# Patient Record
Sex: FEMALE | Race: WHITE | NOT HISPANIC OR LATINO | Employment: OTHER | URBAN - METROPOLITAN AREA
[De-identification: names, ages, dates, MRNs, and addresses within clinical notes are randomized per-mention and may not be internally consistent; named-entity substitution may affect disease eponyms.]

---

## 2017-01-26 ENCOUNTER — TELEPHONE (OUTPATIENT)
Dept: INTERNAL MEDICINE | Facility: CLINIC | Age: 72
End: 2017-01-26

## 2017-01-26 NOTE — TELEPHONE ENCOUNTER
Our goal is to have forms completed within 72 hours, however some forms may require a visit or additional information.    The form was placed at [facility]    Who is the form from? Patient  Where did the form come from? Patient Dropped off in clinic.  Patient's expectations: ASAP   When does it need to be completed: ASAP     The form was placed in the inbox of: Internal Medicine Providers - Dr. Logan Briones  PMD folder    Please mail to [facility] in addressed envelope provided.      Additional comments: Copy of last [employer] form attached to new form.    Call take on 1/26/2017 at 3:40 PM by Chelsey Urbano

## 2017-02-07 ENCOUNTER — PRE VISIT (OUTPATIENT)
Dept: UROLOGY | Facility: CLINIC | Age: 72
End: 2017-02-07

## 2017-02-07 NOTE — TELEPHONE ENCOUNTER
Patient with mixed incontinence coming in to review same day UDS and for a cystoscopy. Chart reviewed and all appointments are made. Pt will need a catheterized urine one week before. Message sent to schedulers.

## 2017-02-09 DIAGNOSIS — E11.59 TYPE 2 DIABETES MELLITUS WITH OTHER CIRCULATORY COMPLICATIONS (H): Primary | ICD-10-CM

## 2017-02-09 NOTE — TELEPHONE ENCOUNTER
metFORMIN (GLUCOPHAGE) 1000 MG tablet         Last Written Prescription Date: 11/11/2016  Last Fill Quantity: 180 tablat, # refills: 0  Last Office Visit with G, P or [facility] prescribing provider:  10/27/2016        BP Readings from Last 3 Encounters:   10/27/16 138/80   10/23/16 158/85   10/06/16 140/100     MICROL      156   9/6/2016  No results found for this basename: microalbumin  CREATININE   Date Value Ref Range Status   10/27/2016 0.66 0.52 - 1.04 mg/dL Final   ]  GFR ESTIMATE   Date Value Ref Range Status   10/27/2016 88 >60 mL/min/1.7m2 Final     Comment:     Non  GFR Calc   10/23/2016 80 >60 mL/min/1.7m2 Final     Comment:     Non  GFR Calc   10/22/2016 71 >60 mL/min/1.7m2 Final     Comment:     Non  GFR Calc     GFR ESTIMATE IF BLACK   Date Value Ref Range Status   10/27/2016 >90   GFR Calc   >60 mL/min/1.7m2 Final   10/23/2016 >90   GFR Calc   >60 mL/min/1.7m2 Final   10/22/2016 86 >60 mL/min/1.7m2 Final     Comment:      GFR Calc     CHOL      147   9/6/2016  HDL       50   9/6/2016  LDL       45   9/6/2016  TRIG      259   9/6/2016  CHOLHDLRATIO      2.6   6/29/2015  AST       21   9/6/2016  ALT       28   9/6/2016  A1C      6.5   9/6/2016  A1C      6.5   11/22/2015  A1C      8.4   5/5/2015  A1C      7.5   7/21/2014  A1C      7.4   4/1/2014  POTASSIUM   Date Value Ref Range Status   10/27/2016 3.8 3.4 - 5.3 mmol/L Final

## 2017-02-13 ENCOUNTER — HOSPITAL ENCOUNTER (OUTPATIENT)
Dept: MAMMOGRAPHY | Facility: CLINIC | Age: 72
Discharge: HOME | End: 2017-02-13
Attending: INTERNAL MEDICINE | Admitting: INTERNAL MEDICINE
Payer: MEDICARE

## 2017-02-13 DIAGNOSIS — N63.20 LEFT BREAST LUMP: ICD-10-CM

## 2017-02-13 PROCEDURE — 76642 ULTRASOUND BREAST LIMITED: CPT | Mod: LT

## 2017-02-15 DIAGNOSIS — K21.9 ESOPHAGEAL REFLUX: Primary | ICD-10-CM

## 2017-02-15 RX ORDER — OMEPRAZOLE 20 MG/1
20 TABLET, DELAYED RELEASE ORAL
Qty: 180 TABLET | Refills: 1 | Status: SHIPPED | OUTPATIENT
Start: 2017-02-15 | End: 2018-01-26

## 2017-02-15 NOTE — TELEPHONE ENCOUNTER
omeprazole (PRILOSEC OTC) 20 MG tablet      Last Written Prescription Date: 12/20/2016  Last Fill Quantity: 180,  # refills: 2   Last Office Visit with [facility], [facility] or [facility] prescribing provider: 10/27/2016

## 2017-03-07 DIAGNOSIS — G25.81 RESTLESS LEG: ICD-10-CM

## 2017-03-07 RX ORDER — ROPINIROLE 4 MG/1
1 TABLET, FILM COATED ORAL AT BEDTIME
Qty: 90 TABLET | Refills: 1 | Status: SHIPPED | OUTPATIENT
Start: 2017-03-07 | End: 2017-09-11

## 2017-03-07 NOTE — TELEPHONE ENCOUNTER
ROPINIROLE HYDROCHLORIDE      Last Written Prescription Date: 06/07/16  Last Fill Quantity: 90, # refills: 2  Last Office Visit with [facility], [facility] or [facility] prescribing provider: 10/27/16   Next 5 appointments (look out 90 days)     Mar 09, 2017 11:30 AM CST   [facility] with Logan Briones MD   [facility]    [address] [address] [phone]                   BP Readings from Last 3 Encounters:   10/27/16 138/80   10/23/16 158/85   10/06/16 (!) 140/100

## 2017-03-09 ENCOUNTER — OFFICE VISIT (OUTPATIENT)
Dept: INTERNAL MEDICINE | Facility: CLINIC | Age: 72
End: 2017-03-09
Payer: MEDICARE

## 2017-03-09 VITALS
HEART RATE: 67 BPM | HEIGHT: 62 IN | OXYGEN SATURATION: 96 % | DIASTOLIC BLOOD PRESSURE: 86 MMHG | TEMPERATURE: 98.1 F | WEIGHT: 157 LBS | BODY MASS INDEX: 28.89 KG/M2 | SYSTOLIC BLOOD PRESSURE: 130 MMHG

## 2017-03-09 DIAGNOSIS — R25.2 CRAMP OF LIMB: Primary | ICD-10-CM

## 2017-03-09 DIAGNOSIS — E11.59 TYPE 2 DIABETES MELLITUS WITH OTHER CIRCULATORY COMPLICATIONS (H): ICD-10-CM

## 2017-03-09 DIAGNOSIS — R09.89 OTHER SPECIFIED SYMPTOMS AND SIGNS INVOLVING THE CIRCULATORY AND RESPIRATORY SYSTEMS: ICD-10-CM

## 2017-03-09 DIAGNOSIS — E03.9 HYPOTHYROIDISM, UNSPECIFIED TYPE: ICD-10-CM

## 2017-03-09 PROBLEM — Z71.89 ADVANCED DIRECTIVES, COUNSELING/DISCUSSION: Status: ACTIVE | Noted: 2017-03-09

## 2017-03-09 LAB
ALBUMIN SERPL-MCNC: 4.1 G/DL (ref 3.4–5)
ALP SERPL-CCNC: 83 U/L (ref 40–150)
ALT SERPL W P-5'-P-CCNC: 26 U/L (ref 0–50)
ANION GAP SERPL CALCULATED.3IONS-SCNC: 7 MMOL/L (ref 3–14)
AST SERPL W P-5'-P-CCNC: 15 U/L (ref 0–45)
BILIRUB SERPL-MCNC: 0.4 MG/DL (ref 0.2–1.3)
BUN SERPL-MCNC: 15 MG/DL (ref 7–30)
CALCIUM SERPL-MCNC: 9.2 MG/DL (ref 8.5–10.1)
CHLORIDE SERPL-SCNC: 109 MMOL/L (ref 94–109)
CO2 SERPL-SCNC: 27 MMOL/L (ref 20–32)
CREAT SERPL-MCNC: 0.7 MG/DL (ref 0.52–1.04)
FERRITIN SERPL-MCNC: 9 NG/ML (ref 8–252)
GFR SERPL CREATININE-BSD FRML MDRD: 82 ML/MIN/1.7M2
GLUCOSE SERPL-MCNC: 90 MG/DL (ref 70–99)
HBA1C MFR BLD: 6.3 % (ref 4.3–6)
IRON SATN MFR SERPL: 17 % (ref 15–46)
IRON SERPL-MCNC: 63 UG/DL (ref 35–180)
MAGNESIUM SERPL-MCNC: 1.8 MG/DL (ref 1.6–2.3)
POTASSIUM SERPL-SCNC: 4.3 MMOL/L (ref 3.4–5.3)
PROT SERPL-MCNC: 7.6 G/DL (ref 6.8–8.8)
SODIUM SERPL-SCNC: 143 MMOL/L (ref 133–144)
T4 FREE SERPL-MCNC: 1.39 NG/DL (ref 0.76–1.46)
TIBC SERPL-MCNC: 371 UG/DL (ref 240–430)
TSH SERPL DL<=0.005 MIU/L-ACNC: 0.2 MU/L (ref 0.4–4)

## 2017-03-09 PROCEDURE — 83036 HEMOGLOBIN GLYCOSYLATED A1C: CPT | Performed by: INTERNAL MEDICINE

## 2017-03-09 PROCEDURE — 84443 ASSAY THYROID STIM HORMONE: CPT | Performed by: INTERNAL MEDICINE

## 2017-03-09 PROCEDURE — 99214 OFFICE O/P EST MOD 30 MIN: CPT | Performed by: INTERNAL MEDICINE

## 2017-03-09 PROCEDURE — 82728 ASSAY OF FERRITIN: CPT | Performed by: INTERNAL MEDICINE

## 2017-03-09 PROCEDURE — 83540 ASSAY OF IRON: CPT | Performed by: INTERNAL MEDICINE

## 2017-03-09 PROCEDURE — 84439 ASSAY OF FREE THYROXINE: CPT | Performed by: INTERNAL MEDICINE

## 2017-03-09 PROCEDURE — 83735 ASSAY OF MAGNESIUM: CPT | Performed by: INTERNAL MEDICINE

## 2017-03-09 PROCEDURE — 83550 IRON BINDING TEST: CPT | Performed by: INTERNAL MEDICINE

## 2017-03-09 PROCEDURE — 36415 COLL VENOUS BLD VENIPUNCTURE: CPT | Performed by: INTERNAL MEDICINE

## 2017-03-09 PROCEDURE — 80053 COMPREHEN METABOLIC PANEL: CPT | Performed by: INTERNAL MEDICINE

## 2017-03-09 PROCEDURE — 99207 C FOOT EXAM  NO CHARGE: CPT | Performed by: INTERNAL MEDICINE

## 2017-03-09 NOTE — PROGRESS NOTES
"  SUBJECTIVE:                                                    Susi Aguila is a 71 year old female who presents to clinic today for the following health issues:      Chief Complaint   Patient presents with     Spasms     spasms in left upper thigh at night- very painful- onset x 6 months- worse over the past 1 week- interfering with sleep     Pt's past medical history, family history, habits, medications and allergies were reviewed with the patient today.  See snap shot for  HCM status. Most recent lab results reviewed with pt. Problem list and histories reviewed & adjusted, as indicated.  Additional history as below:    Spasms only in left thigh area. No RLE sx. Starts when going to sleep. No sx in the daytime. In WC all day. Has chronic back pain that is \"crappy\".  HAs not been checking sugards at home. Hx diabetes mellitus. Denies chest pain, shortness of breath, abd pain, new vision changes.  Eye exam UTD     Additional ROS:   Constitutional, HEENT, Cardiovascular, Pulmonary, GI and , Neuro, MSK and Psych review of systems/symptoms are otherwise negative or unchanged from previous, except as noted above.      OBJECTIVE:  /86  Pulse 67  Temp 98.1  F (36.7  C) (Oral)  Ht 5' 2\" (1.575 m)  Wt 157 lb (71.2 kg)  SpO2 96%  Breastfeeding? No  BMI 28.72 kg/m2   Estimated body mass index is 28.72 kg/(m^2) as calculated from the following:    Height as of this encounter: 5' 2\" (1.575 m).    Weight as of this encounter: 157 lb (71.2 kg).  Eye: PERRL, EOMI  HENT: ear canals and TM's normal and nose and mouth without ulcers or lesions   Neck: no adenopathy. Thyroid normal to palpation. No bruits  Pulm: Lungs clear to auscultation   CV: Regular rates and rhythm  GI: Soft, nontender, Normal active bowel sounds, No hepatosplenomegaly or masses palpable  Ext: Peripheral pulses intact but fainter left PT and DP with left leg feeling cooler to touch from the mid calf distally. Trace BLE edema.  Neuro: Normal strength " and tone RUE and RLE. LLE/LUE hemiparesis. 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 Sitting in WC. Gait not assessed    Assessment/Plan: (See plan discussion below for further details)  1. Cramp of limb  Labs as ordered. U/S LLE to r/o ischemia. If all below normal, will adjust Gabapentin dosing  - Comprehensive metabolic panel  - Iron and iron binding capacity  - Ferritin  - Magnesium  - US Lower Extremity Arterial Duplex Left; Future  - T4 free    2. 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 Not checking sugars. Labs as ordered. Continue current meds for now  - Hemoglobin A1c  - FOOT EXAM  - EYE EXAM (SIMPLE-NONBILLABLE)  - Hemoglobin A1c; Future  - Comprehensive metabolic panel; Future  - Lipid panel reflex to direct LDL; Future  - TSH with free T4 reflex; Future    3. Hypothyroidism, unspecified type  Labs as ordered  - TSH with free T4 reflex  - TSH with free T4 reflex; Future    4. Other specified symptoms and signs involving the circulatory and respiratory systems   See above  - US Lower Extremity Arterial Duplex Left; Future         Logan Briones MD  Internal Medicine Department  [facility]

## 2017-03-09 NOTE — LETTER
"[facility]  [address] [place] [address]  [phone]      3/12/2017       Susi Aguila  [address]   [place] [address]        Dear Susi,  Rafa are a printed copy of your most recent  lab results.   Unless commented on below, mild variation of results  outside the normal range are not clinically signicant.    Electrolyte, Glucose/Sugar, Iron, Kidney function and Liver lab results were normal.  One thyroid test (TSH) lab result was slightly abnormal which would mean your thyroid function was too high but the  other thyroid hormone lab (FT4) was normal so the data is conflicting. TSH results can sometimes vary  due to other factors so I would recommend staying on the same dose of Levothyroxine for now and retesting in the future  A1C lab result (which assesses your average blood sugar control for the past 3 months) was stable and overall showed good control of your diabetes.  Continue current medication for now. Please contact your pharmacy if you need further refills of your medication.  I will await the results of your leg arterial ultrasound study and then make further recommendations  Call our appointment desk at [phone] to schedule a \"lab only\" to have your A1C, Comprehensive Metabolic Panel, Lipid profile (Cholesterol Panel) and TSH checked fasting in 6 months.   For fasting labs, please refrain from eating for 8 hours or more.  Be sure to  drink water and take your  medications the day of the test.     If you have further questions/concerns regarding the results, I would ask that you bring them to your next follow-up appointment with me and I would be happy to review them with you further.        Sincerely,      Logan Briones MD  Internal Medicine      "

## 2017-03-09 NOTE — MR AVS SNAPSHOT
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 They will call to schedule. If not heard from them by Monday, then call [phone]  Labs as ordered  Further treatment based on results        Follow-ups after your visit        Your next 10 appointments already scheduled     Apr 06, 2017  9:30 AM CDT   (Arrive by 9:15 AM)   Urodynamics with  Uro Procedure Nurse   [facility] ([facility])    [address] [place] [address] [phone]            Apr 06, 2017  1:00 PM CDT   (Arrive by 12:45 PM)   Cystoscopy with Ruby Rodriguez MD   [facility] ([facility])    [address] [place] [address] [phone]              Future tests that were ordered for you today     Open Future Orders        Priority Expected Expires Ordered    US Lower Extremity Arterial Duplex Left Routine  3/9/2018 3/9/2017            Who to contact     If you have questions or need follow up information about today's clinic visit or your schedule please contact [facility] directly at [phone].  Normal or non-critical lab and imaging results will be communicated to you by MyChart, letter or phone within 4 business days after the clinic has  "received the results. If you do not hear from us within 7 days, please contact the clinic through [employer] or phone. If you have a critical or abnormal lab result, we will notify you by phone as soon as possible.  Submit refill requests through [employer] or call your pharmacy and they will forward the refill request to us. Please allow 3 business days for your refill to be completed.          Additional Information About Your Visit        Care EveryWhere ID     This is your Care EveryWhere ID. This could be used by other organizations to access your [facility] medical records  [id number]        Your Vitals Were     Pulse Temperature Height Pulse Oximetry Breastfeeding? BMI (Body Mass Index)    67 98.1  F (36.7  C) (Oral) 5' 2\" (1.575 m) 96% No 28.72 kg/m2       Blood Pressure from Last 3 Encounters:   03/09/17 130/86   10/27/16 138/80   10/23/16 158/85    Weight from Last 3 Encounters:   03/09/17 157 lb (71.2 kg)   10/27/16 137 lb (62.1 kg)   10/22/16 161 lb 8 oz (73.3 kg)              We Performed the Following     Comprehensive metabolic panel     EYE EXAM (SIMPLE-NONBILLABLE)     Ferritin     FOOT EXAM     Hemoglobin A1c     Iron and iron binding capacity     Magnesium     TSH with free T4 reflex          Today's Medication Changes          These changes are accurate as of: 3/9/17 12:10 PM.  If you have any questions, ask your nurse or doctor.               These medicines have changed or have updated prescriptions.        Dose/Directions    gabapentin 600 MG tablet   Commonly known as:  NEURONTIN   This may have changed:  when to take this   Used for:  Sciatica, unspecified laterality        Dose:  600 mg   Take 1 tablet (600 mg) by mouth 3 times daily   Quantity:  270 tablet   Refills:  3                Primary Care Provider Office Phone # Fax #    Logan Briones [phone]       [facility] [address] [facility] [address]        Thank you!     Thank you for choosing [facility] " [facility]  for your care. Our goal is always to provide you with excellent care. Hearing back from our patients is one way we can continue to improve our services. Please take a few minutes to complete the written survey that you may receive in the mail after your visit with us. Thank you!             Your Updated Medication List - Protect others around you: Learn how to safely use, store and throw away your medicines at www.disposemymeds.org.          This list is accurate as of: 3/9/17 12:10 PM.  Always use your most recent med list.                   Brand Name Dispense Instructions for use    cycloSPORINE 0.05 % ophthalmic emulsion    RESTASIS     Place 1 drop into both eyes 3 times daily       fluticasone 50 MCG/ACT spray    FLONASE    3 Package    Spray 1-2 sprays into both nostrils daily if needed       gabapentin 600 MG tablet    NEURONTIN    270 tablet    Take 1 tablet (600 mg) by mouth 3 times daily       levothyroxine 100 MCG tablet    SYNTHROID/LEVOTHROID    90 tablet    Take 1 tablet (100 mcg) by mouth daily       lisinopril 20 MG tablet    PRINIVIL/ZESTRIL    90 tablet    Take 1 tablet (20 mg) by mouth daily       metFORMIN 1000 MG tablet    GLUCOPHAGE    180 tablet    Take 1 tablet (1,000 mg) by mouth 2 times daily (with meals)       naproxen 500 MG tablet    NAPROSYN    60 tablet    Take 1 tablet (500 mg) by mouth 2 times daily (with meals) as needed for pain       omeprazole 20 MG tablet    priLOSEC OTC    180 tablet    Take 1 tablet (20 mg) by mouth 2 times daily (before meals)       ranitidine 150 MG tablet    ZANTAC    180 tablet    Take 1 tablet (150 mg) by mouth 2 times daily       ROPINIROLE HYDROCHLORIDE 4 MG Tabs    REQUIP    90 tablet    Take 1 tablet (4 mg) by mouth At Bedtime       traZODone 50 MG tablet    DESYREL    90 tablet    Take 1 tablet (50 mg) by mouth At Bedtime

## 2017-03-09 NOTE — NURSING NOTE
"Chief Complaint   Patient presents with     Spasms     spasms in left upper thigh at night- very painful- onset x 6 months- worse over the past 1 week- interfering with sleep       Initial /86  Pulse 67  Temp 98.1  F (36.7  C) (Oral)  Ht 5' 2\" (1.575 m)  Wt 157 lb (71.2 kg)  SpO2 96%  Breastfeeding? No  BMI 28.72 kg/m2 Estimated body mass index is 28.72 kg/(m^2) as calculated from the following:    Height as of this encounter: 5' 2\" (1.575 m).    Weight as of this encounter: 157 lb (71.2 kg).  Medication Reconciliation: complete    "

## 2017-03-09 NOTE — PATIENT INSTRUCTIONS
Ultrasound of left leg arterial syste at [facility]. They will call to schedule. If not heard from them by Monday, then call [phone]  Labs as ordered  Further treatment based on results

## 2017-03-13 DIAGNOSIS — E78.5 HYPERLIPIDEMIA LDL GOAL <100: ICD-10-CM

## 2017-03-13 RX ORDER — ATORVASTATIN CALCIUM 40 MG/1
40 TABLET, FILM COATED ORAL DAILY
Qty: 90 TABLET | Refills: 1 | Status: SHIPPED | OUTPATIENT
Start: 2017-03-13 | End: 2018-01-26

## 2017-03-13 NOTE — TELEPHONE ENCOUNTER
atorvastatin (LIPITOR) 40 MG tablet       Last Written Prescription Date:  9/13/2016  Last Fill Quantity: 90,   # refills: 1  Last Office Visit with [facility], [facility] or [facility] prescribing provider: 3/09/2017  Future Office visit:       Routing refill request to provider for review/approval because:  Medication is reported/discontinued

## 2017-03-20 ENCOUNTER — HOSPITAL ENCOUNTER (OUTPATIENT)
Dept: ULTRASOUND IMAGING | Facility: CLINIC | Age: 72
Discharge: HOME | End: 2017-03-20
Attending: INTERNAL MEDICINE | Admitting: INTERNAL MEDICINE
Payer: MEDICARE

## 2017-03-20 DIAGNOSIS — R09.89 OTHER SPECIFIED SYMPTOMS AND SIGNS INVOLVING THE CIRCULATORY AND RESPIRATORY SYSTEMS: ICD-10-CM

## 2017-03-20 DIAGNOSIS — R25.2 CRAMP OF LIMB: ICD-10-CM

## 2017-03-20 PROCEDURE — 93926 LOWER EXTREMITY STUDY: CPT | Mod: LT

## 2017-03-24 DIAGNOSIS — G47.00 INSOMNIA: ICD-10-CM

## 2017-03-27 DIAGNOSIS — B00.9 HSV (HERPES SIMPLEX VIRUS) INFECTION: ICD-10-CM

## 2017-03-27 RX ORDER — TRAZODONE HYDROCHLORIDE 50 MG/1
TABLET, FILM COATED ORAL
Qty: 90 TABLET | Refills: 3 | Status: SHIPPED | OUTPATIENT
Start: 2017-03-27 | End: 2017-07-27

## 2017-03-27 RX ORDER — ACYCLOVIR 200 MG/1
CAPSULE ORAL
Qty: 25 CAPSULE | Refills: 5 | Status: ON HOLD | OUTPATIENT
Start: 2017-03-27 | End: 2018-02-08

## 2017-03-27 RX ORDER — ACYCLOVIR 200 MG/1
200 CAPSULE ORAL
Qty: 25 CAPSULE | Refills: 5 | Status: SHIPPED | OUTPATIENT
Start: 2017-03-27 | End: 2017-03-27

## 2017-03-27 NOTE — TELEPHONE ENCOUNTER
Pt is has developed an outbreak of herpes and wondering if she can get a script for Acyclovir.  Please let her know

## 2017-03-27 NOTE — TELEPHONE ENCOUNTER
traZODone (DESYREL) 50 MG tablet       Last Written Prescription Date: 7/08/2016  Last Fill Quantity: 90; # refills: 2  Last Office Visit with [facility], [facility] or [facility] prescribing provider:  3/09/2017   Next 5 appointments (look out 90 days)     Mar 30, 2017  2:30 PM CDT   Nurse Only with [facility] IM NURSE   [facility]    [address]  [facility] [id number] [phone]                   Last PHQ-9 score on record=   PHQ-9 SCORE 4/25/2016   Total Score -   Total Score 5       Lab Results   Component Value Date    AST 15 03/09/2017     Lab Results   Component Value Date    ALT 26 03/09/2017

## 2017-04-03 DIAGNOSIS — R30.0 DYSURIA: Primary | ICD-10-CM

## 2017-04-04 ENCOUNTER — PRE VISIT (OUTPATIENT)
Dept: UROLOGY | Facility: CLINIC | Age: 72
End: 2017-04-04

## 2017-04-04 NOTE — TELEPHONE ENCOUNTER
Patient coming in for a cystoscopy and to review UDS results. Chart reviewed and all appointments are made. No need for a call.

## 2017-04-19 ENCOUNTER — HOSPITAL ENCOUNTER (OUTPATIENT)
Dept: MRI IMAGING | Facility: CLINIC | Age: 72
Discharge: HOME | End: 2017-04-19
Attending: NEUROLOGICAL SURGERY | Admitting: NEUROLOGICAL SURGERY
Payer: MEDICARE

## 2017-04-19 ENCOUNTER — HOSPITAL ENCOUNTER (OUTPATIENT)
Dept: MRI IMAGING | Facility: CLINIC | Age: 72
End: 2017-04-19
Attending: NEUROLOGICAL SURGERY
Payer: MEDICARE

## 2017-04-19 DIAGNOSIS — I67.1 BRAIN ANEURYSM: ICD-10-CM

## 2017-04-19 PROCEDURE — 70553 MRI BRAIN STEM W/O & W/DYE: CPT

## 2017-04-19 PROCEDURE — A9585 GADOBUTROL INJECTION: HCPCS | Performed by: NEUROLOGICAL SURGERY

## 2017-04-19 PROCEDURE — 70544 MR ANGIOGRAPHY HEAD W/O DYE: CPT | Mod: XS

## 2017-04-19 PROCEDURE — 25500064 ZZH RX 255 OP 636: Performed by: NEUROLOGICAL SURGERY

## 2017-04-19 RX ORDER — GADOBUTROL 604.72 MG/ML
7 INJECTION INTRAVENOUS ONCE
Status: COMPLETED | OUTPATIENT
Start: 2017-04-19 | End: 2017-04-19

## 2017-04-19 RX ADMIN — GADOBUTROL 7 ML: 604.72 INJECTION INTRAVENOUS at 14:32

## 2017-05-08 ENCOUNTER — DOCUMENTATION ONLY (OUTPATIENT)
Dept: OTHER | Facility: CLINIC | Age: 72
End: 2017-05-08

## 2017-05-08 DIAGNOSIS — Z71.89 ACP (ADVANCE CARE PLANNING): ICD-10-CM

## 2017-05-19 DIAGNOSIS — E11.59 TYPE 2 DIABETES MELLITUS WITH OTHER CIRCULATORY COMPLICATIONS (H): ICD-10-CM

## 2017-05-19 NOTE — TELEPHONE ENCOUNTER
metFORMIN (GLUCOPHAGE) 1000 MG tablet         Last Written Prescription Date: 2/09/2017  Last Fill Quantity: 180, # refills: 0  Last Office Visit with [facility] or [facility] prescribing provider:  3/09/2017        BP Readings from Last 3 Encounters:   03/09/17 130/86   10/27/16 138/80   10/23/16 158/85     Lab Results   Component Value Date    MICROL 156 09/06/2016     Lab Results   Component Value Date    UMALCR 96.30 09/06/2016     Creatinine   Date Value Ref Range Status   03/09/2017 0.70 0.52 - 1.04 mg/dL Final   ]  GFR Estimate   Date Value Ref Range Status   03/09/2017 82 >60 mL/min/1.7m2 Final     Comment:     Non  GFR Calc   10/27/2016 88 >60 mL/min/1.7m2 Final     Comment:     Non  GFR Calc   10/23/2016 80 >60 mL/min/1.7m2 Final     Comment:     Non  GFR Calc     GFR Estimate If Black   Date Value Ref Range Status   03/09/2017 >90   GFR Calc   >60 mL/min/1.7m2 Final   10/27/2016 >90   GFR Calc   >60 mL/min/1.7m2 Final   10/23/2016 >90   GFR Calc   >60 mL/min/1.7m2 Final     Lab Results   Component Value Date    CHOL 147 09/06/2016     Lab Results   Component Value Date    HDL 50 09/06/2016     Lab Results   Component Value Date    LDL 45 09/06/2016     Lab Results   Component Value Date    TRIG 259 09/06/2016     Lab Results   Component Value Date    CHOLHDLRATIO 2.6 06/29/2015     Lab Results   Component Value Date    AST 15 03/09/2017     Lab Results   Component Value Date    ALT 26 03/09/2017     Lab Results   Component Value Date    A1C 6.3 03/09/2017    A1C 6.5 09/06/2016    A1C 6.5 11/22/2015    A1C 8.4 05/05/2015    A1C 7.5 07/21/2014     Potassium   Date Value Ref Range Status   03/09/2017 4.3 3.4 - 5.3 mmol/L Final

## 2017-05-23 ENCOUNTER — TELEPHONE (OUTPATIENT)
Dept: INTERNAL MEDICINE | Facility: CLINIC | Age: 72
End: 2017-05-23

## 2017-05-23 ENCOUNTER — PRE VISIT (OUTPATIENT)
Dept: UROLOGY | Facility: CLINIC | Age: 72
End: 2017-05-23

## 2017-05-23 DIAGNOSIS — M54.30 SCIATICA, UNSPECIFIED LATERALITY: ICD-10-CM

## 2017-05-23 RX ORDER — NAPROXEN 500 MG/1
TABLET ORAL
Qty: 60 TABLET | Refills: 9 | Status: SHIPPED | OUTPATIENT
Start: 2017-05-23 | End: 2017-05-23

## 2017-05-23 NOTE — TELEPHONE ENCOUNTER
Reason for call: Form   Our goal is to have forms completed within 72 hours, however some forms may require a visit or additional information.     Who is the form from? [employer]   Where did the form come from? Patient or family brought in     What clinic location was the form placed at? [facility]  Where was the form placed? Pcp folder  What number is listed as a contact on the form?     Phone call message - patient request for a letter, form or note:     Date needed: as soon as possible  Please fax to [employer] @ [phone]  Has the patient signed a consent form for release of information? Not Applicable    Additional comments:     Type of letter, form or note: [employer] Form    Phone Number Pt can be reached at: Home number on file [phone] (home)  Best Time:   Can we leave a detailed message on this number? YES

## 2017-05-23 NOTE — TELEPHONE ENCOUNTER
form from "[employer] to be completed in MD's folder on s station; pt called and states it needs to be faxed back today by 7p; fax # on form

## 2017-05-23 NOTE — TELEPHONE ENCOUNTER
Chart reviewed for upcoming UDS.  Chart note says Jennifer wants cathed sample week before; called patient requesting she come to nurse visit this week to drop sample. Says she cannot come here/is a huge pain with [employer] and wants to drop sample when she is at her lab appt at [facility] this week.  Called [facility] and they have hats.  Patient will try to urinate in hat.  Sent this info to Dr. Rodriguez.

## 2017-05-23 NOTE — TELEPHONE ENCOUNTER
Naproxen       Last Written Prescription Date: 7/6/16  Last Quantity: 60, # refills: 1  Last Office Visit with [facility], [facility] or [facility] prescribing provider: 3/9/17       Creatinine   Date Value Ref Range Status   03/09/2017 0.70 0.52 - 1.04 mg/dL Final     Lab Results   Component Value Date    AST 15 03/09/2017     Lab Results   Component Value Date    ALT 26 03/09/2017     BP Readings from Last 3 Encounters:   03/09/17 130/86   10/27/16 138/80   10/23/16 158/85

## 2017-05-24 DIAGNOSIS — K21.9 ESOPHAGEAL REFLUX: ICD-10-CM

## 2017-05-24 RX ORDER — NAPROXEN 500 MG/1
TABLET ORAL
Qty: 180 TABLET | Refills: 2 | Status: SHIPPED | OUTPATIENT
Start: 2017-05-24 | End: 2021-01-21

## 2017-05-26 ENCOUNTER — OFFICE VISIT (OUTPATIENT)
Dept: UROLOGY | Facility: CLINIC | Age: 72
End: 2017-05-26
Payer: MEDICARE

## 2017-05-26 DIAGNOSIS — R30.0 DYSURIA: ICD-10-CM

## 2017-05-26 DIAGNOSIS — N39.46 MIXED INCONTINENCE: Primary | ICD-10-CM

## 2017-05-26 LAB
ALBUMIN UR-MCNC: 30 MG/DL
APPEARANCE UR: ABNORMAL
BILIRUB UR QL STRIP: NEGATIVE
COLOR UR AUTO: YELLOW
GLUCOSE UR STRIP-MCNC: NEGATIVE MG/DL
HGB UR QL STRIP: ABNORMAL
KETONES UR STRIP-MCNC: NEGATIVE MG/DL
LEUKOCYTE ESTERASE UR QL STRIP: ABNORMAL
NITRATE UR QL: POSITIVE
PH UR STRIP: 5 PH (ref 5–7)
SP GR UR STRIP: 1.01 (ref 1–1.03)
URN SPEC COLLECT METH UR: ABNORMAL
UROBILINOGEN UR STRIP-ACNC: 1 EU/DL (ref 0.2–1)

## 2017-05-26 PROCEDURE — 81003 URINALYSIS AUTO W/O SCOPE: CPT | Performed by: UROLOGY

## 2017-05-26 PROCEDURE — 87186 SC STD MICRODIL/AGAR DIL: CPT | Performed by: UROLOGY

## 2017-05-26 PROCEDURE — 87088 URINE BACTERIA CULTURE: CPT | Performed by: UROLOGY

## 2017-05-26 PROCEDURE — 99211 OFF/OP EST MAY X REQ PHY/QHP: CPT | Performed by: UROLOGY

## 2017-05-26 PROCEDURE — 87086 URINE CULTURE/COLONY COUNT: CPT | Performed by: UROLOGY

## 2017-05-26 NOTE — PROGRESS NOTES
Susi Aguila comes into clinic today at the request of Dr. Rodriguez for cathed UA/UC.  This service provided today was under the supervising provider of the day, Dr. Rock. who was available if needed.  Patient here for cathed UA/UC prior to procedure at the [facility].      Catheter insertion documentation on 5/26/2017:    Susi Aguila presents to the clinic for catheter insertion.  Reason for insertion: collect UA/UC  Order has been verified. yes  Catheter successfully inserted into the urethral meatus in the usual sterile fashion without immediate complication.  Type of catheter placed: 14 fr female catheter  Urine is brandon  200 cc's of urine output returned.  Catheter removed.  The patient tolerated the procedure well and was instructed that we will call her with culture results.  Treatment pending results.    So Pickering LPN

## 2017-05-26 NOTE — MR AVS SNAPSHOT
After Visit Summary   5/26/2017    Susi Aguila    MRN: [id number]           Patient Information     Date Of Birth          1945        Visit Information        Provider Department      5/26/2017 10:30 AM UA NURSE [facility]        Today's Diagnoses     Dysuria           Follow-ups after your visit        Your next 10 appointments already scheduled     Jun 01, 2017  9:30 AM CDT   (Arrive by 9:15 AM)   Urodynamics with Uc Uro Procedure Nurse   [facility] ([facility])    [address] [place] [id number]   [phone]            Jun 01, 2017  1:00 PM CDT   (Arrive by 12:45 PM)   Cystoscopy with Ruby Rodriguez MD   [facility] ([facility])    [address] [place] [id number] [phone]              Who to contact     If you have questions or need follow up information about today's clinic visit or your schedule please contact [facility] directly at [phone].  Normal or non-critical lab and imaging results will be communicated to you by MyChart, letter or phone within 4 business days after the clinic has received the results. If you do not hear from us within 7 days, please contact the clinic through MyChart or phone. If you have a critical or abnormal lab result, we will notify you by phone as soon as possible.  Submit refill requests through [employer] or call your pharmacy and they will forward the refill request to us. Please allow 3 business days for your refill to be completed.          Additional Information About Your Visit        [employer] Information     [employer] lets you send messages to your doctor, view your test results, renew your prescriptions, schedule appointments and more. To sign up, go to  [email or web address] . Click on \"Log in\" on the left side of the screen, which will take you to the Welcome page. Then click on \"Sign up Now\" on the right side of the page.     You will be asked to enter the access code listed below, as well as some personal information. Please follow the directions to create your username and password.     Your access code is: [email or web address]  Expires: 2017  6:30 AM     Your access code will  in 90 days. If you need help or a new code, please call your [place] clinic or [phone].        Care EveryWhere ID     This is your Care EveryWhere ID. This could be used by other organizations to access your [place] medical records  [id number]         Blood Pressure from Last 3 Encounters:   17 130/86   10/27/16 138/80   10/23/16 158/85    Weight from Last 3 Encounters:   17 71.2 kg (157 lb)   10/27/16 62.1 kg (137 lb)   10/22/16 73.3 kg (161 lb 8 oz)              We Performed the Following     Urine Culture Aerobic Bacterial          Today's Medication Changes          These changes are accurate as of: 17 11:01 AM.  If you have any questions, ask your nurse or doctor.               These medicines have changed or have updated prescriptions.        Dose/Directions    gabapentin 600 MG tablet   Commonly known as:  NEURONTIN   This may have changed:  when to take this   Used for:  Sciatica, unspecified laterality        Dose:  600 mg   Take 1 tablet (600 mg) by mouth 3 times daily   Quantity:  270 tablet   Refills:  3                Primary Care Provider Office Phone # Fax #    Logan Briones [phone]       [facility] [address] [facility] [address]        Thank you!     Thank you for choosing [facility] [place]  for your care. Our goal is always to provide you with excellent care. Hearing back from our patients is one way we can continue to improve our services. Please take a few minutes to " complete the written survey that you may receive in the mail after your visit with us. Thank you!             Your Updated Medication List - Protect others around you: Learn how to safely use, store and throw away your medicines at www.disposemymeds.org.          This list is accurate as of: 5/26/17 11:01 AM.  Always use your most recent med list.                   Brand Name Dispense Instructions for use    acyclovir 200 MG capsule    ZOVIRAX    25 capsule    For 5 days  per herpes outbreak       atorvastatin 40 MG tablet    LIPITOR    90 tablet    Take 1 tablet (40 mg) by mouth daily       cycloSPORINE 0.05 % ophthalmic emulsion    RESTASIS     Place 1 drop into both eyes 3 times daily       fluticasone 50 MCG/ACT spray    FLONASE    3 Package    Spray 1-2 sprays into both nostrils daily if needed       gabapentin 600 MG tablet    NEURONTIN    270 tablet    Take 1 tablet (600 mg) by mouth 3 times daily       levothyroxine 100 MCG tablet    SYNTHROID/LEVOTHROID    90 tablet    Take 1 tablet (100 mcg) by mouth daily       lisinopril 20 MG tablet    PRINIVIL/ZESTRIL    90 tablet    Take 1 tablet (20 mg) by mouth daily       metFORMIN 1000 MG tablet    GLUCOPHAGE    180 tablet    Take 1 tablet (1,000 mg) by mouth 2 times daily (with meals)       naproxen 500 MG tablet    NAPROSYN    180 tablet    TAKE 1 TABLET(500 MG) BY MOUTH TWICE DAILY WITH MEALS AS NEEDED FOR PAIN       omeprazole 20 MG tablet    priLOSEC OTC    180 tablet    Take 1 tablet (20 mg) by mouth 2 times daily (before meals)       ranitidine 150 MG tablet    ZANTAC    180 tablet    Take 1 tablet (150 mg) by mouth 2 times daily       ROPINIROLE HYDROCHLORIDE 4 MG Tabs    REQUIP    90 tablet    Take 1 tablet (4 mg) by mouth At Bedtime       traZODone 50 MG tablet    DESYREL    90 tablet    TAKE 1 TABLET(50 MG) BY MOUTH AT BEDTIME

## 2017-05-28 LAB
BACTERIA SPEC CULT: ABNORMAL
Lab: ABNORMAL
MICRO REPORT STATUS: ABNORMAL
MICROORGANISM SPEC CULT: ABNORMAL
SPECIMEN SOURCE: ABNORMAL

## 2017-05-30 ENCOUNTER — PRE VISIT (OUTPATIENT)
Dept: UROLOGY | Facility: CLINIC | Age: 72
End: 2017-05-30

## 2017-05-30 ENCOUNTER — TELEPHONE (OUTPATIENT)
Dept: LAB | Facility: CLINIC | Age: 72
End: 2017-05-30

## 2017-05-30 DIAGNOSIS — N39.0 URINARY TRACT INFECTION: Primary | ICD-10-CM

## 2017-05-30 RX ORDER — CIPROFLOXACIN 500 MG/1
500 TABLET, FILM COATED ORAL 2 TIMES DAILY
Qty: 10 TABLET | Refills: 0 | Status: SHIPPED | OUTPATIENT
Start: 2017-05-30 | End: 2017-07-27

## 2017-05-30 NOTE — TELEPHONE ENCOUNTER
----- Message from Ruby Rodriguez MD sent at 5/30/2017  9:11 AM CDT -----  Contact: [phone]  She needs 48hrs of antibiotics so if she starts the medication this morning it is not ideal but can be done.  Will just need to make sure we check a urine dip prior to any procedures on Thursday    C     ----- Message -----     From: Crystal Henry LPN     Sent: 5/30/2017   9:01 AM       To: Ruby Rodriguez MD    Patient has positive culture  Ordered cipro she is scheduled for cysto and urodynamics on Thursday  Will that work with her meds starting today. crystal

## 2017-05-30 NOTE — TELEPHONE ENCOUNTER
Patient with mixed incontinece coming in a cystoscopy following UDS. Chart reviewed and pt could not come to our clinic for a catheterized urine sample. Sample positive.

## 2017-06-04 DIAGNOSIS — E78.5 HYPERLIPIDEMIA LDL GOAL <100: ICD-10-CM

## 2017-06-05 RX ORDER — ATORVASTATIN CALCIUM 40 MG/1
TABLET, FILM COATED ORAL
Qty: 90 TABLET | Refills: 0 | OUTPATIENT
Start: 2017-06-05

## 2017-07-05 ENCOUNTER — HOSPITAL ENCOUNTER (EMERGENCY)
Facility: CLINIC | Age: 72
Discharge: HOME | End: 2017-07-05
Attending: PHYSICIAN ASSISTANT | Admitting: PHYSICIAN ASSISTANT
Payer: MEDICARE

## 2017-07-05 ENCOUNTER — APPOINTMENT (OUTPATIENT)
Dept: CT IMAGING | Facility: CLINIC | Age: 72
End: 2017-07-05
Attending: PHYSICIAN ASSISTANT
Payer: MEDICARE

## 2017-07-05 VITALS
DIASTOLIC BLOOD PRESSURE: 95 MMHG | HEART RATE: 82 BPM | RESPIRATION RATE: 16 BRPM | TEMPERATURE: 98.6 F | SYSTOLIC BLOOD PRESSURE: 187 MMHG | OXYGEN SATURATION: 94 %

## 2017-07-05 DIAGNOSIS — K59.00 CONSTIPATION, UNSPECIFIED CONSTIPATION TYPE: ICD-10-CM

## 2017-07-05 DIAGNOSIS — I10 BENIGN ESSENTIAL HYPERTENSION: ICD-10-CM

## 2017-07-05 LAB
ALBUMIN SERPL-MCNC: 3.6 G/DL (ref 3.4–5)
ALBUMIN UR-MCNC: NEGATIVE MG/DL
ALP SERPL-CCNC: 97 U/L (ref 40–150)
ALT SERPL W P-5'-P-CCNC: 41 U/L (ref 0–50)
ANION GAP SERPL CALCULATED.3IONS-SCNC: 5 MMOL/L (ref 3–14)
APPEARANCE UR: ABNORMAL
AST SERPL W P-5'-P-CCNC: 36 U/L (ref 0–45)
BACTERIA #/AREA URNS HPF: ABNORMAL /HPF
BASOPHILS # BLD AUTO: 0 10E9/L (ref 0–0.2)
BASOPHILS NFR BLD AUTO: 0.3 %
BILIRUB SERPL-MCNC: 0.5 MG/DL (ref 0.2–1.3)
BILIRUB UR QL STRIP: NEGATIVE
BUN SERPL-MCNC: 18 MG/DL (ref 7–30)
CALCIUM SERPL-MCNC: 9.6 MG/DL (ref 8.5–10.1)
CHLORIDE SERPL-SCNC: 107 MMOL/L (ref 94–109)
CO2 SERPL-SCNC: 29 MMOL/L (ref 20–32)
COLOR UR AUTO: YELLOW
CREAT SERPL-MCNC: 0.83 MG/DL (ref 0.52–1.04)
DIFFERENTIAL METHOD BLD: ABNORMAL
EOSINOPHIL # BLD AUTO: 0.2 10E9/L (ref 0–0.7)
EOSINOPHIL NFR BLD AUTO: 2.1 %
ERYTHROCYTE [DISTWIDTH] IN BLOOD BY AUTOMATED COUNT: 13.2 % (ref 10–15)
GFR SERPL CREATININE-BSD FRML MDRD: 68 ML/MIN/1.7M2
GLUCOSE SERPL-MCNC: 115 MG/DL (ref 70–99)
GLUCOSE UR STRIP-MCNC: NEGATIVE MG/DL
HCT VFR BLD AUTO: 42.4 % (ref 35–47)
HGB BLD-MCNC: 13.2 G/DL (ref 11.7–15.7)
HGB UR QL STRIP: ABNORMAL
IMM GRANULOCYTES # BLD: 0 10E9/L (ref 0–0.4)
IMM GRANULOCYTES NFR BLD: 0.3 %
KETONES UR STRIP-MCNC: NEGATIVE MG/DL
LEUKOCYTE ESTERASE UR QL STRIP: ABNORMAL
LIPASE SERPL-CCNC: 321 U/L (ref 73–393)
LYMPHOCYTES # BLD AUTO: 2.7 10E9/L (ref 0.8–5.3)
LYMPHOCYTES NFR BLD AUTO: 37.4 %
MCH RBC QN AUTO: 28.9 PG (ref 26.5–33)
MCHC RBC AUTO-ENTMCNC: 31.1 G/DL (ref 31.5–36.5)
MCV RBC AUTO: 93 FL (ref 78–100)
MONOCYTES # BLD AUTO: 0.5 10E9/L (ref 0–1.3)
MONOCYTES NFR BLD AUTO: 6.7 %
MUCOUS THREADS #/AREA URNS LPF: PRESENT /LPF
NEUTROPHILS # BLD AUTO: 3.8 10E9/L (ref 1.6–8.3)
NEUTROPHILS NFR BLD AUTO: 53.2 %
NITRATE UR QL: NEGATIVE
NRBC # BLD AUTO: 0 10*3/UL
NRBC BLD AUTO-RTO: 0 /100
PH UR STRIP: 6 PH (ref 5–7)
PLATELET # BLD AUTO: 214 10E9/L (ref 150–450)
POTASSIUM SERPL-SCNC: 4.5 MMOL/L (ref 3.4–5.3)
PROT SERPL-MCNC: 7.3 G/DL (ref 6.8–8.8)
RBC # BLD AUTO: 4.57 10E12/L (ref 3.8–5.2)
RBC #/AREA URNS AUTO: 5 /HPF (ref 0–2)
SODIUM SERPL-SCNC: 141 MMOL/L (ref 133–144)
SP GR UR STRIP: 1 (ref 1–1.03)
SQUAMOUS #/AREA URNS AUTO: 7 /HPF (ref 0–1)
URN SPEC COLLECT METH UR: ABNORMAL
UROBILINOGEN UR STRIP-MCNC: 2 MG/DL (ref 0–2)
WBC # BLD AUTO: 7.2 10E9/L (ref 4–11)
WBC #/AREA URNS AUTO: >182 /HPF (ref 0–2)

## 2017-07-05 PROCEDURE — 99285 EMERGENCY DEPT VISIT HI MDM: CPT | Mod: 25

## 2017-07-05 PROCEDURE — 81001 URINALYSIS AUTO W/SCOPE: CPT | Performed by: PHYSICIAN ASSISTANT

## 2017-07-05 PROCEDURE — 25000132 ZZH RX MED GY IP 250 OP 250 PS 637: Mod: GY | Performed by: PHYSICIAN ASSISTANT

## 2017-07-05 PROCEDURE — 85025 COMPLETE CBC W/AUTO DIFF WBC: CPT | Performed by: PHYSICIAN ASSISTANT

## 2017-07-05 PROCEDURE — 87086 URINE CULTURE/COLONY COUNT: CPT | Performed by: PHYSICIAN ASSISTANT

## 2017-07-05 PROCEDURE — 83690 ASSAY OF LIPASE: CPT | Performed by: PHYSICIAN ASSISTANT

## 2017-07-05 PROCEDURE — A9270 NON-COVERED ITEM OR SERVICE: HCPCS | Mod: GY | Performed by: PHYSICIAN ASSISTANT

## 2017-07-05 PROCEDURE — 25000128 H RX IP 250 OP 636: Performed by: PHYSICIAN ASSISTANT

## 2017-07-05 PROCEDURE — 74177 CT ABD & PELVIS W/CONTRAST: CPT

## 2017-07-05 PROCEDURE — 80053 COMPREHEN METABOLIC PANEL: CPT | Performed by: PHYSICIAN ASSISTANT

## 2017-07-05 RX ORDER — IOPAMIDOL 755 MG/ML
500 INJECTION, SOLUTION INTRAVASCULAR ONCE
Status: COMPLETED | OUTPATIENT
Start: 2017-07-05 | End: 2017-07-05

## 2017-07-05 RX ADMIN — IOPAMIDOL 79 ML: 755 INJECTION, SOLUTION INTRAVENOUS at 19:09

## 2017-07-05 RX ADMIN — DOCUSATE SODIUM 286 ML: 50 LIQUID ORAL at 20:02

## 2017-07-05 RX ADMIN — SODIUM CHLORIDE 61 ML: 9 INJECTION, SOLUTION INTRAVENOUS at 19:09

## 2017-07-05 ASSESSMENT — ENCOUNTER SYMPTOMS
NAUSEA: 0
CHILLS: 0
DYSURIA: 0
FEVER: 0
FLANK PAIN: 0
VOMITING: 1
ABDOMINAL PAIN: 1
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
## 2017-07-05 NOTE — DISCHARGE INSTRUCTIONS
[facility] Line contact number [phone]. Please use this number for free community resource assistance.    Discharge to home with [facility] Care services. The staff will call to schedule the first visit. Their phone number is [phone].    Rest, fluids, start MIRALAX daily. And once completely cleaned out then start stool softener (COLACE).   Follow up with primary care for recheck on Monday. Also recheck blood pressure as it was elevated today.   Urine showed bacteria, but will culture prior to starting antibiotics, given no urinary symptoms.   Return if worsening symptoms, high fevers, recurrent vomiting, or any other new concerns.     Discharge Instructions  Constipation  Your doctor has diagnosed you with constipation. Constipation can cause severe cramping pain and your physician thinks this is the cause of your abdominal pain today.  People usually recognize that they are constipated because they have difficulty having bowel movements, are not having bowel movements frequently enough, or are not having large enough bowel movements. Sometimes, especially in children or older people, you do not recognize that you are constipated until it becomes severe. The most common cause of constipation is a combination of lack of exercise and not eating enough fruits, vegetables and whole grains. Constipation can also be a side effect of medications, such as narcotics, or may be caused by a disease of the digestive system.    Return to the Emergency Department if:    Your abdominal pain worsens or does not improve after a bowel movement.    You become very weak.    You get an oral temperature above 102oF or as directed by your doctor.    You have blood in your stools (bright red or black, tarry stools).    You keep throwing up or can t drink liquids.    Your see blood when you throw up.    Your stomach gets bloated or bigger.    You have new symptoms or anything that worries you.    What can I do to help  myself?    If your doctor gave you a cathartic medication, like magnesium citrate or GoLytely  (polyethylene glycol), you can expect to have cramps and gas pains after taking it. You can expect to have a number of bowel movements and even diarrhea in the course of clearing your bowels.  You will know your bowels have been cleaned out after you pass clear liquid. The cramps and gas should let up after you have emptied your bowels. You may want to wait until morning to take this type of medication so you aren t up in the night.     Sometimes instead of cathartics, we recommend laxatives like milk of magnesia to move your bowels more slowly, or an enema to help the bowels to move. Read and follow the package directions, or follow your physician s instructions.    Once you have become very constipated, it takes time for your bowels to return to normal and you need to be very careful to prevent becoming constipated again. Take a laxative if you don t move your bowels at least every two days.       Eat foods that have a lot of fiber. Good choices are fruits, vegetables, prune juice, apple juice and high fiber cereal. Limit dairy products such as milk and cheese, since these can make constipation worse.     Drink plenty of water and other fluids.     When you feel the need to go to the bathroom, go to the bathroom. Don t hold it.    Miralax , Metamucil , Colace , Senna or fiber supplements can be used daily.  Miralax  daily is often the best choice for children.    FOLLOW UP WITH YOUR REGULAR DOCTOR IF YOUR CONSTIPATION IS NOT IMPROVING.  Sometimes, chronic constipation requires further testing to determine the cause. If you are over 50 years old, you may need a colonoscopy if you have not had one before.   If you were given a prescription for medicine here today, be sure to read all of the information (including the package insert) that comes with your prescription.  This will include important information about the  medicine, its side effects, and any warnings that you need to know about.  The pharmacist who fills the prescription can provide more information and answer questions you may have about the medicine.  If you have questions or concerns that the pharmacist cannot address, please call or return to the Emergency Department.   Discharge Instructions  Hypertension - High Blood Pressure    During you visit to the Emergency Department, your blood pressure was higher than the recommended blood pressure.  This may be related to stress, pain, medication or other temporary conditions. In these cases, your blood pressure may return to normal on its own. If you have a history of high blood pressure, you may need to have your doctor adjust your medications. Sometimes, your high measurement here may indicate that you have developed high blood pressure that will stay high unless it is treated. Sudden very high blood pressure can cause problems, but usually high blood pressure causes problems over months to years.      Blood pressure is almost never lowered in the Emergency Department, because studies have shown that lowering blood pressure too quickly is much more dangerous than leaving it alone.    You need to follow up with your doctor in 1-3 days to get your blood pressure rechecked.     Return to the Emergency Department if you start to have:    A severe headache.    Chest pain.    Shortness of breath.    Weakness or numbness that affects one part of the body.    Confusion.    Vision changes.    Significant swelling of legs and/or eyes.    A reaction to any medication started in the Emergency Department.    What can I do to help myself?    Avoid alcohol.    Take any blood pressure medicine that you are prescribed.    Get a good night s sleep.    Lower your salt intake.    Exercise.    Lose weight.    Manage stress.    If blood pressure medication was started in the Emergency Department:    The medicine may not have an immediate  effect. The body and brain determine what blood pressure you have. The medicine s job is to retrain the body s  thermostat  to a lower blood pressure.    You will need to follow up with your doctor to see how this medicine is working for you.  If you were given a prescription for medicine here today, be sure to read all of the information (including the package insert) that comes with your prescription.  This will include important information about the medicine, its side effects, and any warnings that you need to know about.  The pharmacist who fills the prescription can provide more information and answer questions you may have about the medicine.  If you have questions or concerns that the pharmacist cannot address, please call or return to the Emergency Department.   Remember that you can always come back to the Emergency Department if you are not able to see your regular doctor in the amount of time listed above, if you get any new symptoms, or if there is anything that worries you.

## 2017-07-05 NOTE — ED AVS SNAPSHOT
[facility] Emergency Department    [address]    [facility] [address]    Phone:  [phone]    Fax:  [phone]                                       Susi Aguila   MRN: [id number]    Department:  [facility] Emergency Department   Date of Visit:  7/5/2017           After Visit Summary Signature Page     I have received my discharge instructions, and my questions have been answered. I have discussed any challenges I see with this plan with the nurse or doctor.    ..........................................................................................................................................  Patient/Patient Representative Signature      ..........................................................................................................................................  Patient Representative Print Name and Relationship to Patient    ..................................................               ................................................  Date                                            Time    ..........................................................................................................................................  Reviewed by Signature/Title    ...................................................              ..............................................  Date                                                            Time

## 2017-07-05 NOTE — ED AVS SNAPSHOT
[facility] Emergency Department    [address]    [facility] [address]    Phone:  [phone]    Fax:  [phone]                                       Susi Aguila   MRN: [id number]    Department:  [facility] Emergency Department   Date of Visit:  7/5/2017           Patient Information     Date Of Birth          1945        Your diagnoses for this visit were:     Constipation, unspecified constipation type     Benign essential hypertension        You were seen by Lucinda Gomez PA-C.      Follow-up Information     Follow up with [facility] Emergency Department.    Specialty:  EMERGENCY MEDICINE    Why:  If symptoms worsen    Contact information:    [address]  [facility] [address] [phone]        Follow up with Logan Briones MD On 7/10/2017.    Specialty:  Internal Medicine    Contact information:    [facility]  [address] [facility] [address]  [phone]          Discharge Instructions       [facility] contact number [phone]. Please use this number for free community resource assistance.    Discharge to home with [facility] services. The staff will call to schedule the first visit. Their phone number is [phone].    Rest, fluids, start MIRALAX daily. And once completely cleaned out then start stool softener (COLACE).   Follow up with primary care for recheck on Monday. Also recheck blood pressure as it was elevated today.   Urine showed bacteria, but will culture prior to starting antibiotics, given no urinary symptoms.   Return if worsening symptoms, high fevers, recurrent vomiting, or any other new concerns.     Discharge Instructions  Constipation  Your doctor has diagnosed you with constipation. Constipation can cause severe cramping pain and your physician thinks this is the cause of your abdominal pain today.  People usually recognize that they are constipated  because they have difficulty having bowel movements, are not having bowel movements frequently enough, or are not having large enough bowel movements. Sometimes, especially in children or older people, you do not recognize that you are constipated until it becomes severe. The most common cause of constipation is a combination of lack of exercise and not eating enough fruits, vegetables and whole grains. Constipation can also be a side effect of medications, such as narcotics, or may be caused by a disease of the digestive system.    Return to the Emergency Department if:    Your abdominal pain worsens or does not improve after a bowel movement.    You become very weak.    You get an oral temperature above 102oF or as directed by your doctor.    You have blood in your stools (bright red or black, tarry stools).    You keep throwing up or can t drink liquids.    Your see blood when you throw up.    Your stomach gets bloated or bigger.    You have new symptoms or anything that worries you.    What can I do to help myself?    If your doctor gave you a cathartic medication, like magnesium citrate or GoLytely  (polyethylene glycol), you can expect to have cramps and gas pains after taking it. You can expect to have a number of bowel movements and even diarrhea in the course of clearing your bowels.  You will know your bowels have been cleaned out after you pass clear liquid. The cramps and gas should let up after you have emptied your bowels. You may want to wait until morning to take this type of medication so you aren t up in the night.     Sometimes instead of cathartics, we recommend laxatives like milk of magnesia to move your bowels more slowly, or an enema to help the bowels to move. Read and follow the package directions, or follow your physician s instructions.    Once you have become very constipated, it takes time for your bowels to return to normal and you need to be very careful to prevent becoming  constipated again. Take a laxative if you don t move your bowels at least every two days.       Eat foods that have a lot of fiber. Good choices are fruits, vegetables, prune juice, apple juice and high fiber cereal. Limit dairy products such as milk and cheese, since these can make constipation worse.     Drink plenty of water and other fluids.     When you feel the need to go to the bathroom, go to the bathroom. Don t hold it.    Miralax , Metamucil , Colace , Senna or fiber supplements can be used daily.  Miralax  daily is often the best choice for children.    FOLLOW UP WITH YOUR REGULAR DOCTOR IF YOUR CONSTIPATION IS NOT IMPROVING.  Sometimes, chronic constipation requires further testing to determine the cause. If you are over 50 years old, you may need a colonoscopy if you have not had one before.   If you were given a prescription for medicine here today, be sure to read all of the information (including the package insert) that comes with your prescription.  This will include important information about the medicine, its side effects, and any warnings that you need to know about.  The pharmacist who fills the prescription can provide more information and answer questions you may have about the medicine.  If you have questions or concerns that the pharmacist cannot address, please call or return to the Emergency Department.   Discharge Instructions  Hypertension - High Blood Pressure    During you visit to the Emergency Department, your blood pressure was higher than the recommended blood pressure.  This may be related to stress, pain, medication or other temporary conditions. In these cases, your blood pressure may return to normal on its own. If you have a history of high blood pressure, you may need to have your doctor adjust your medications. Sometimes, your high measurement here may indicate that you have developed high blood pressure that will stay high unless it is treated. Sudden very high blood  pressure can cause problems, but usually high blood pressure causes problems over months to years.      Blood pressure is almost never lowered in the Emergency Department, because studies have shown that lowering blood pressure too quickly is much more dangerous than leaving it alone.    You need to follow up with your doctor in 1-3 days to get your blood pressure rechecked.     Return to the Emergency Department if you start to have:    A severe headache.    Chest pain.    Shortness of breath.    Weakness or numbness that affects one part of the body.    Confusion.    Vision changes.    Significant swelling of legs and/or eyes.    A reaction to any medication started in the Emergency Department.    What can I do to help myself?    Avoid alcohol.    Take any blood pressure medicine that you are prescribed.    Get a good night s sleep.    Lower your salt intake.    Exercise.    Lose weight.    Manage stress.    If blood pressure medication was started in the Emergency Department:    The medicine may not have an immediate effect. The body and brain determine what blood pressure you have. The medicine s job is to retrain the body s  thermostat  to a lower blood pressure.    You will need to follow up with your doctor to see how this medicine is working for you.  If you were given a prescription for medicine here today, be sure to read all of the information (including the package insert) that comes with your prescription.  This will include important information about the medicine, its side effects, and any warnings that you need to know about.  The pharmacist who fills the prescription can provide more information and answer questions you may have about the medicine.  If you have questions or concerns that the pharmacist cannot address, please call or return to the Emergency Department.   Remember that you can always come back to the Emergency Department if you are not able to see your regular doctor in the amount of  time listed above, if you get any new symptoms, or if there is anything that worries you.        Future Appointments        Provider Department Dept Phone Center    8/10/2017 9:30 AM Leslye Contreras PA-C [facility] [phone] [facility]    8/10/2017 1:00 PM MD KIMBERLY [facility] [phone] [facility]      24 Hour Appointment Hotline       To make an appointment at any [facility], call [phone] ([phone]). If you don't have a family doctor or clinic, we will help you find one. [facility] are conveniently located to serve the needs of you and your family.             Review of your medicines      Our records show that you are taking the medicines listed below. If these are incorrect, please call your family doctor or clinic.        Dose / Directions Last dose taken    acyclovir 200 MG capsule   Commonly known as:  ZOVIRAX   Quantity:  25 capsule        For 5 days  per herpes outbreak   Refills:  5        atorvastatin 40 MG tablet   Commonly known as:  LIPITOR   Dose:  40 mg   Quantity:  90 tablet        Take 1 tablet (40 mg) by mouth daily   Refills:  1        ciprofloxacin 500 MG tablet   Commonly known as:  CIPRO   Dose:  500 mg   Quantity:  10 tablet        Take 1 tablet (500 mg) by mouth 2 times daily   Refills:  0        cycloSPORINE 0.05 % ophthalmic emulsion   Commonly known as:  RESTASIS   Dose:  1 drop        Place 1 drop into both eyes 3 times daily   Refills:  0        fluticasone 50 MCG/ACT spray   Commonly known as:  FLONASE   Dose:  1-2 spray   Quantity:  3 Package        Spray 1-2 sprays into both nostrils daily if needed   Refills:  prn        gabapentin 600 MG tablet   Commonly known as:  NEURONTIN   Dose:  600 mg   Quantity:  270 tablet        Take 1 tablet (600 mg) by mouth 3 times daily   Refills:  3        levothyroxine 100 MCG tablet   Commonly known as:   SYNTHROID/LEVOTHROID   Dose:  100 mcg   Quantity:  90 tablet        Take 1 tablet (100 mcg) by mouth daily   Refills:  1        lisinopril 20 MG tablet   Commonly known as:  PRINIVIL/ZESTRIL   Dose:  20 mg   Quantity:  90 tablet        Take 1 tablet (20 mg) by mouth daily   Refills:  3        metFORMIN 1000 MG tablet   Commonly known as:  GLUCOPHAGE   Dose:  1000 mg   Quantity:  180 tablet        Take 1 tablet (1,000 mg) by mouth 2 times daily (with meals)   Refills:  0        naproxen 500 MG tablet   Commonly known as:  NAPROSYN   Quantity:  180 tablet        TAKE 1 TABLET(500 MG) BY MOUTH TWICE DAILY WITH MEALS AS NEEDED FOR PAIN   Refills:  2        omeprazole 20 MG tablet   Commonly known as:  priLOSEC OTC   Dose:  20 mg   Quantity:  180 tablet        Take 1 tablet (20 mg) by mouth 2 times daily (before meals)   Refills:  1        ranitidine 150 MG tablet   Commonly known as:  ZANTAC   Dose:  150 mg   Quantity:  180 tablet        Take 1 tablet (150 mg) by mouth 2 times daily   Refills:  2        ROPINIROLE HYDROCHLORIDE 4 MG Tabs   Commonly known as:  REQUIP   Dose:  1 tablet   Quantity:  90 tablet        Take 1 tablet (4 mg) by mouth At Bedtime   Refills:  1        traZODone 50 MG tablet   Commonly known as:  DESYREL   Quantity:  90 tablet        TAKE 1 TABLET(50 MG) BY MOUTH AT BEDTIME   Refills:  3                Procedures and tests performed during your visit     CBC + differential    CT Abdomen Pelvis w Contrast    Comprehensive metabolic panel    IV access    Lipase    Social Work IP Consult    UA with Microscopic    Urine Culture      Orders Needing Specimen Collection     None      Pending Results     Date and Time Order Name Status Description    7/5/2017 2108 Urine Culture In process             Pending Culture Results     Date and Time Order Name Status Description    7/5/2017 2108 Urine Culture In process             Pending Results Instructions     If you had any lab results that were not finalized  at the time of your Discharge, you can call the ED Lab Result RN at [phone]. You will be contacted by this team for any positive Lab results or changes in treatment. The nurses are available 7 days a week from 10A to 6:30P.  You can leave a message 24 hours per day and they will return your call.        Test Results From Your Hospital Stay        7/5/2017  6:24 PM      Component Results     Component Value Ref Range & Units Status    WBC 7.2 4.0 - 11.0 10e9/L Final    RBC Count 4.57 3.8 - 5.2 10e12/L Final    Hemoglobin 13.2 11.7 - 15.7 g/dL Final    Hematocrit 42.4 35.0 - 47.0 % Final    MCV 93 78 - 100 fl Final    MCH 28.9 26.5 - 33.0 pg Final    MCHC 31.1 (L) 31.5 - 36.5 g/dL Final    RDW 13.2 10.0 - 15.0 % Final    Platelet Count 214 150 - 450 10e9/L Final    Diff Method Automated Method  Final    % Neutrophils 53.2 % Final    % Lymphocytes 37.4 % Final    % Monocytes 6.7 % Final    % Eosinophils 2.1 % Final    % Basophils 0.3 % Final    % Immature Granulocytes 0.3 % Final    Nucleated RBCs 0 0 /100 Final    Absolute Neutrophil 3.8 1.6 - 8.3 10e9/L Final    Absolute Lymphocytes 2.7 0.8 - 5.3 10e9/L Final    Absolute Monocytes 0.5 0.0 - 1.3 10e9/L Final    Absolute Eosinophils 0.2 0.0 - 0.7 10e9/L Final    Absolute Basophils 0.0 0.0 - 0.2 10e9/L Final    Abs Immature Granulocytes 0.0 0 - 0.4 10e9/L Final    Absolute Nucleated RBC 0.0  Final         7/5/2017  6:44 PM      Component Results     Component Value Ref Range & Units Status    Sodium 141 133 - 144 mmol/L Final    Potassium 4.5 3.4 - 5.3 mmol/L Final    Specimen slightly hemolyzed, potassium may be falsely elevated    Chloride 107 94 - 109 mmol/L Final    Carbon Dioxide 29 20 - 32 mmol/L Final    Anion Gap 5 3 - 14 mmol/L Final    Glucose 115 (H) 70 - 99 mg/dL Final    Urea Nitrogen 18 7 - 30 mg/dL Final    Creatinine 0.83 0.52 - 1.04 mg/dL Final    GFR Estimate 68 >60 mL/min/1.7m2 Final    Non  GFR Calc    GFR Estimate If Black 83  >60 mL/min/1.7m2 Final    African American GFR Calc    Calcium 9.6 8.5 - 10.1 mg/dL Final    Bilirubin Total 0.5 0.2 - 1.3 mg/dL Final    Albumin 3.6 3.4 - 5.0 g/dL Final    Protein Total 7.3 6.8 - 8.8 g/dL Final    Alkaline Phosphatase 97 40 - 150 U/L Final    ALT 41 0 - 50 U/L Final    AST 36 0 - 45 U/L Final    Specimen is hemolyzed which can falsely elevate AST. Analysis of a   non-hemolyzed   specimen may result in a lower value.           7/5/2017  7:38 PM      Narrative     CT ABDOMEN AND PELVIS WITH CONTRAST  7/5/2017 7:17 PM    HISTORY: Constipation, vomiting, evaluate for obstruction.    COMPARISON: A CT without contrast on 3/28/2015.    TECHNIQUE: Routine transverse CT imaging of the abdomen and pelvis was  performed following the uneventful administration of 79 mL Isovue-370  intravenous contrast. Radiation dose for this scan was reduced using  automated exposure control, adjustment of the mA and/or kV according  to patient size, or iterative reconstruction technique.    FINDINGS: The visualized lung bases are clear. The liver, spleen, and  pancreas are normal. Again seen are surgical changes of a  cholecystectomy. The adrenal glands are normal. Again seen are a few  right renal cysts, the largest measuring 6 cm. A previously seen left  ureteral stent has been removed and the previously noted adjacent left  ureteral calculus has resolved. The urinary tract is otherwise  unremarkable with no current calculus or evidence of obstruction. No  definite bladder pathology is seen, although the left aspect is  obscured by artifact from the patient's left hip surgery. No enlarged  lymph node or other abnormal mass is demonstrated. No free fluid is  seen. No free intraperitoneal gas is identified. The gastrointestinal  tract is unremarkable. The appendix is not identified. There is no  additional evidence of appendicitis. There is mild calcification in  the vascular structures. There are interval surgical changes  of a left  hip arthroplasty. There are degenerative changes in the spine. No  other osseous abnormality is noted. No abdominal or pelvic wall  pathology is demonstrated.         Impression     IMPRESSION: No acute abnormality is demonstrated. Specifically, there  is no evidence of gastrointestinal tract obstruction.    JOSÉ ANTONIO DENNIS MD         7/5/2017  6:44 PM      Component Results     Component Value Ref Range & Units Status    Lipase 321 73 - 393 U/L Final         7/5/2017  8:30 PM      Component Results     Component Value Ref Range & Units Status    Color Urine Yellow  Final    Appearance Urine Slightly Cloudy  Final    Glucose Urine Negative NEG mg/dL Final    Bilirubin Urine Negative NEG Final    Ketones Urine Negative NEG mg/dL Final    Specific Gravity Urine 1.005 1.003 - 1.035 Final    Blood Urine Small (A) NEG Final    pH Urine 6.0 5.0 - 7.0 pH Final    Protein Albumin Urine Negative NEG mg/dL Final    Urobilinogen mg/dL 2.0 0.0 - 2.0 mg/dL Final    Nitrite Urine Negative NEG Final    Leukocyte Esterase Urine Large (A) NEG Final    Source Midstream Urine  Final    WBC Urine >182 (H) 0 - 2 /HPF Final    RBC Urine 5 (H) 0 - 2 /HPF Final    Bacteria Urine Moderate (A) NEG /HPF Final    Squamous Epithelial /HPF Urine 7 (H) 0 - 1 /HPF Final    Mucous Urine Present (A) NEG /LPF Final         7/5/2017  9:10 PM                Clinical Quality Measure: Blood Pressure Screening     Your blood pressure was checked while you were in the emergency department today. The last reading we obtained was  BP: (!) 178/95 . Please read the guidelines below about what these numbers mean and what you should do about them.  If your systolic blood pressure (the top number) is less than 120 and your diastolic blood pressure (the bottom number) is less than 80, then your blood pressure is normal. There is nothing more that you need to do about it.  If your systolic blood pressure (the top number) is 120-139 or your diastolic  "blood pressure (the bottom number) is 80-89, your blood pressure may be higher than it should be. You should have your blood pressure rechecked within a year by a primary care provider.  If your systolic blood pressure (the top number) is 140 or greater or your diastolic blood pressure (the bottom number) is 90 or greater, you may have high blood pressure. High blood pressure is treatable, but if left untreated over time it can put you at risk for heart attack, stroke, or kidney failure. You should have your blood pressure rechecked by a primary care provider within the next 4 weeks.  If your provider in the emergency department today gave you specific instructions to follow-up with your doctor or provider even sooner than that, you should follow that instruction and not wait for up to 4 weeks for your follow-up visit.        Thank you for choosing [facility]       Thank you for choosing [facility] for your care. Our goal is always to provide you with excellent care. Hearing back from our patients is one way we can continue to improve our services. Please take a few minutes to complete the written survey that you may receive in the mail after you visit with us. Thank you!        Modify Information     Modify lets you send messages to your doctor, view your test results, renew your prescriptions, schedule appointments and more. To sign up, go to [email or web address] . Click on \"Log in\" on the left side of the screen, which will take you to the Welcome page. Then click on \"Sign up Now\" on the right side of the page.     You will be asked to enter the access code listed below, as well as some personal information. Please follow the directions to create your username and password.     Your access code is: [email or web address]  Expires: 2017  6:30 AM     Your access code will  in 90 days. If you need help or a new code, please call your [facility] clinic or [phone].        Care EveryWhere ID     This is your Care " EveryWhere ID. This could be used by other organizations to access your [facility] medical records  [id number]        Equal Access to Services     [place] : Suma Mehta, ry smith, katherin luong, lala miller. So [facility] [phone].    ATENCIÓN: Si habla español, tiene a cabezas disposición servicios gratuitos de asistencia lingüística. Llame al [phone].    We comply with applicable federal civil rights laws and [place] laws. We do not discriminate on the basis of race, color, national origin, age, disability sex, sexual orientation or gender identity.            After Visit Summary       This is your record. Keep this with you and show to your community pharmacist(s) and doctor(s) at your next visit.

## 2017-07-06 LAB
BACTERIA SPEC CULT: NORMAL
Lab: NORMAL
MICRO REPORT STATUS: NORMAL
SPECIMEN SOURCE: NORMAL

## 2017-07-06 NOTE — ED NOTES
When sliding bedpan under patient, skin on L Buttock stuck to pan. Pt received a small abrasion. Small amount of bleeding. Cleaned and bandaid placed over area. Pt informed.

## 2017-07-07 ENCOUNTER — TELEPHONE (OUTPATIENT)
Dept: NURSING | Facility: CLINIC | Age: 72
End: 2017-07-07

## 2017-07-07 NOTE — TELEPHONE ENCOUNTER
Arnold AMARA calling with request for orders.  Patient was recently discharged from hospital and  recommended home care nurse visit to evaluate patient and overall living situation.  Due to staffing shortage nurse will visit either 7/8 or 7/9.  Verbal consent given per standing nurse orders.  Marcela Nunez RN

## 2017-07-09 ENCOUNTER — DOCUMENTATION ONLY (OUTPATIENT)
Dept: CARE COORDINATION | Facility: CLINIC | Age: 72
End: 2017-07-09

## 2017-07-09 NOTE — PROGRESS NOTES
Home care admission orders    RN 3w1, 2w1, 1w2, 3prn  PT/OT/SW to eval and treat    Pt with complaints of Itching, was taking hydroxyzine from 2013, please address at next apt.       SUMMARY TO MD  71 yr old female with ER visit 7/7/17 due to constipation x5 days. Pt received an enema, had positive results and was discharged to home.  Pt returnes to home where she lives in an independnet living apartment on the first floor. Pt has 40 hours of PCA services weekly which are provided by her /friend/ Ruben.  Per ER there are possible relationship issues however during home care visit today pt denies any and all issues, this was difficult to assess as the /friend/ was present for the majority of the visit.  Pt is wheelchair bound, she has had a CVA a few years ago and per her statement she was somewhat ambulatory after the cva however had a fall and suffered a broken right hip requiring ORIF and has completly recoverd from that episode.  Pt is wheelchair bound, she can stand to piviot transfer but is weak due to not feeling well over the last week.  Pt requires assistance with all ADL's. Pt has no open wounds or sores, there was report of slight abrasion in ER from bed pan however it is no longer visiable at SOC visit.  Pts appetite is adequate, mood is stable.  Pt has ongoing isses with her bladder and has upcoming apts with urology scheduled on 8/10/17. Pts constipation has resolved. Pt had 2 BM's as of SOC visit, she has stool softners in the home but is not currently using, she has not picked up miralax.    Pt requires ongoing skilled nursing for bowel management and medication education, skin integirty assessment, and urinary incontinece education.  PT/OT for home safety evaluation, update to home exercise program, balacne, strengthing, endurance, adaptive equipment education, bathroom/shower safety/transfers.  Pt open to SW for long term care planning, alternative living situation, financial assessment and  community resources.  Refusing need for ST/HHA at this time  POLST was introudced [facility] but not yet completed.  Thank you for this referral  Deanna Og RN, BSN  [email or web address]  [phone]

## 2017-07-12 DIAGNOSIS — E03.9 HYPOTHYROIDISM, UNSPECIFIED TYPE: ICD-10-CM

## 2017-07-12 RX ORDER — LEVOTHYROXINE SODIUM 100 UG/1
TABLET ORAL
Qty: 90 TABLET | Refills: 0 | Status: SHIPPED | OUTPATIENT
Start: 2017-07-12 | End: 2017-11-05

## 2017-07-12 NOTE — TELEPHONE ENCOUNTER
Prescription approved per [facility] Refill Protocol.  Due for tsh in 9/17

## 2017-07-19 ENCOUNTER — DOCUMENTATION ONLY (OUTPATIENT)
Dept: CARE COORDINATION | Facility: CLINIC | Age: 72
End: 2017-07-19

## 2017-07-19 NOTE — PROGRESS NOTES
[facility] now requests orders and shares plan of care/discharge summaries for some patients through [employer].  Please REPLY TO THIS MESSAGE in order to give authorization for orders when needed.  This is considered a verbal order, you will still receive a faxed copy of orders for signature.  Thank you for your assistance in improving collaboration for our patients.    ORDER    Request for another  visit to assist with group home/AB placement options.   CHINA Kasper

## 2017-07-20 ENCOUNTER — TELEPHONE (OUTPATIENT)
Dept: INTERNAL MEDICINE | Facility: CLINIC | Age: 72
End: 2017-07-20

## 2017-07-20 NOTE — TELEPHONE ENCOUNTER
[facility] PT calling for additional home care orders.  Okayed per nursing standing orders.    PT 1x week for one week, 2x week for 3 weeks.       Slime Menard RN

## 2017-07-24 DIAGNOSIS — Z53.9 DIAGNOSIS NOT YET DEFINED: Primary | ICD-10-CM

## 2017-07-24 PROCEDURE — G0180 MD CERTIFICATION HHA PATIENT: HCPCS | Performed by: INTERNAL MEDICINE

## 2017-07-27 ENCOUNTER — TELEPHONE (OUTPATIENT)
Dept: UROLOGY | Facility: CLINIC | Age: 72
End: 2017-07-27

## 2017-07-27 ENCOUNTER — OFFICE VISIT (OUTPATIENT)
Dept: INTERNAL MEDICINE | Facility: CLINIC | Age: 72
End: 2017-07-27
Payer: MEDICARE

## 2017-07-27 VITALS
SYSTOLIC BLOOD PRESSURE: 128 MMHG | OXYGEN SATURATION: 96 % | WEIGHT: 153 LBS | DIASTOLIC BLOOD PRESSURE: 84 MMHG | BODY MASS INDEX: 27.98 KG/M2 | TEMPERATURE: 98.5 F | HEART RATE: 60 BPM

## 2017-07-27 DIAGNOSIS — G62.9 NEUROPATHY: ICD-10-CM

## 2017-07-27 DIAGNOSIS — G47.00 INSOMNIA, UNSPECIFIED TYPE: ICD-10-CM

## 2017-07-27 DIAGNOSIS — N39.0 URINARY TRACT INFECTION: Primary | ICD-10-CM

## 2017-07-27 DIAGNOSIS — K59.00 CONSTIPATION, UNSPECIFIED CONSTIPATION TYPE: ICD-10-CM

## 2017-07-27 DIAGNOSIS — H61.23 BILATERAL IMPACTED CERUMEN: ICD-10-CM

## 2017-07-27 PROCEDURE — 99214 OFFICE O/P EST MOD 30 MIN: CPT | Performed by: INTERNAL MEDICINE

## 2017-07-27 RX ORDER — GABAPENTIN 600 MG/1
TABLET ORAL
Qty: 405 TABLET | Refills: 3 | Status: SHIPPED | OUTPATIENT
Start: 2017-07-27 | End: 2017-10-20

## 2017-07-27 RX ORDER — TRAZODONE HYDROCHLORIDE 50 MG/1
TABLET, FILM COATED ORAL
Qty: 180 TABLET | Refills: 3 | Status: SHIPPED | OUTPATIENT
Start: 2017-07-27 | End: 2018-01-26

## 2017-07-27 NOTE — PATIENT INSTRUCTIONS
OK to remain off of Miralax for now. If recurrent constipation, then restart daily as needed  Increase trazodone to 50mg tab, 2 tabs at bedtime for insomnia  For leg burning neuropathy, may increase Gabapentin to 600mg tab, 1-1.5 tabs thee times a day  Over the counter Debrox, 4 drops in left ear daily and then irrigate a few minutes later with bulb syringe and lukewarm water for 1 week for wax issue

## 2017-07-27 NOTE — MR AVS SNAPSHOT
After Visit Summary   7/27/2017    Susi Aguila    MRN: [id number]           Patient Information     Date Of Birth          1945        Visit Information        Provider Department      7/27/2017 11:30 AM Logan Briones MD [facility]        Today's Diagnoses     Insomnia, unspecified type        Sciatica, unspecified laterality          Care Instructions    OK to remain off of Miralax for now. If recurrent constipation, then restart daily as needed  Increase trazodone to 50mg tab, 2 tabs at bedtime for insomnia  For leg burning neuropathy, may increase Gabapentin to 600mg tab, 1-1.5 tabs thee times a day  Over the counter Debrox, 4 drops in left ear daily and then irrigate a few minutes later with bulb syringe and lukewarm water for 1 week for wax issue          Follow-ups after your visit        Your next 10 appointments already scheduled     Aug 10, 2017  9:30 AM CDT   (Arrive by 9:15 AM)   Urodynamics with Leslye Contreras PA-C   [facility] ([facility])    [address] [place] [id number]   [phone]            Aug 10, 2017  1:00 PM CDT   (Arrive by 12:45 PM)   Cystoscopy with Ruby Rodriguez MD   [facility] ([facility])    [address] [place] [id number]   [phone]            Aug 22, 2017  2:15 PM CDT   New Visit with Dai Cueto PA-C   [facility] ([facility])    [address] [id number] [phone]              Future tests that were ordered for you today     Open Future Orders        Priority Expected Expires Ordered    Routine UA with microscopic - No culture Routine 7/27/2017 7/27/2018 7/27/2017    Urine Culture Aerobic Bacterial Routine 7/27/2017 7/27/2018 7/27/2017  "           Who to contact     If you have questions or need follow up information about today's clinic visit or your schedule please contact [facility] directly at [phone].  Normal or non-critical lab and imaging results will be communicated to you by MyChart, letter or phone within 4 business days after the clinic has received the results. If you do not hear from us within 7 days, please contact the clinic through MyChart or phone. If you have a critical or abnormal lab result, we will notify you by phone as soon as possible.  Submit refill requests through [employer] or call your pharmacy and they will forward the refill request to us. Please allow 3 business days for your refill to be completed.          Additional Information About Your Visit        [place] Information     [employer] lets you send messages to your doctor, view your test results, renew your prescriptions, schedule appointments and more. To sign up, go to [email or web address] . Click on \"Log in\" on the left side of the screen, which will take you to the Welcome page. Then click on \"Sign up Now\" on the right side of the page.     You will be asked to enter the access code listed below, as well as some personal information. Please follow the directions to create your username and password.     Your access code is: [email or web address]  Expires: 2017  6:30 AM     Your access code will  in 90 days. If you need help or a new code, please call your [place] clinic or [phone].        Care EveryWhere ID     This is your Care EveryWhere ID. This could be used by other organizations to access your [place] medical records  [id number]        Your Vitals Were     Pulse Temperature Pulse Oximetry BMI (Body Mass Index)          60 98.5  F (36.9  C) (Oral) 96% 27.98 kg/m2         Blood Pressure from Last 3 Encounters:   17 128/84   17 (!) 187/95   17 130/86    Weight from Last 3 Encounters:   17 153 lb " (69.4 kg)   03/09/17 157 lb (71.2 kg)   10/27/16 137 lb (62.1 kg)              Today, you had the following     No orders found for display         Today's Medication Changes          These changes are accurate as of: 7/27/17 12:11 PM.  If you have any questions, ask your nurse or doctor.               These medicines have changed or have updated prescriptions.        Dose/Directions    gabapentin 600 MG tablet   Commonly known as:  NEURONTIN   This may have changed:    - how much to take  - how to take this  - when to take this  - additional instructions   Used for:  Sciatica, unspecified laterality   Changed by:  Logan Briones MD        1-1.5 tabs three times a day   Quantity:  405 tablet   Refills:  3       traZODone 50 MG tablet   Commonly known as:  DESYREL   This may have changed:  See the new instructions.   Used for:  Insomnia, unspecified type   Changed by:  Logan Briones MD        TAKE 2 TABLETS (total 100 MG) BY MOUTH AT BEDTIME   Quantity:  180 tablet   Refills:  3         Stop taking these medicines if you haven't already. Please contact your care team if you have questions.     ciprofloxacin 500 MG tablet   Commonly known as:  CIPRO   Stopped by:  Logan Briones MD                Where to get your medicines      These medications were sent to [facility] [address] [place] [address] - [address] [facility]  [address] [facility] [address]     Phone:  [phone]     gabapentin 600 MG tablet    traZODone 50 MG tablet                Primary Care Provider Office Phone # Fax #    Logan Briones [phone]       [facility] [address] [place] [id number]        Equal Access to Services     [facility]: Hadii tyler clarke hadasho Sojasen, waaxda luqadaha, qaybta kaalmada cherise, lala miller. So [facility] [phone].    ATENCIÓN: Si habla español, tiene a cabezas disposición servicios gratuitos de asistencia lingüística. Llame al  [phone].    We comply with applicable federal civil rights laws and [place] laws. We do not discriminate on the basis of race, color, national origin, age, disability sex, sexual orientation or gender identity.            Thank you!     Thank you for choosing [facility]  for your care. Our goal is always to provide you with excellent care. Hearing back from our patients is one way we can continue to improve our services. Please take a few minutes to complete the written survey that you may receive in the mail after your visit with us. Thank you!             Your Updated Medication List - Protect others around you: Learn how to safely use, store and throw away your medicines at www.disposemymeds.org.          This list is accurate as of: 7/27/17 12:11 PM.  Always use your most recent med list.                   Brand Name Dispense Instructions for use Diagnosis    acyclovir 200 MG capsule    ZOVIRAX    25 capsule    For 5 days  per herpes outbreak    HSV (herpes simplex virus) infection       atorvastatin 40 MG tablet    LIPITOR    90 tablet    Take 1 tablet (40 mg) by mouth daily    Hyperlipidemia LDL goal <100       cycloSPORINE 0.05 % ophthalmic emulsion    RESTASIS     Place 1 drop into both eyes 3 times daily        fluticasone 50 MCG/ACT spray    FLONASE    3 Package    Spray 1-2 sprays into both nostrils daily if needed    Allergic rhinitis       gabapentin 600 MG tablet    NEURONTIN    405 tablet    1-1.5 tabs three times a day    Sciatica, unspecified laterality       levothyroxine 100 MCG tablet    SYNTHROID/LEVOTHROID    90 tablet    TAKE 1 TABLET(100 MCG) BY MOUTH DAILY    Hypothyroidism, unspecified type       lisinopril 20 MG tablet    PRINIVIL/ZESTRIL    90 tablet    Take 1 tablet (20 mg) by mouth daily    Essential hypertension       metFORMIN 1000 MG tablet    GLUCOPHAGE    180 tablet    Take 1 tablet (1,000 mg) by mouth 2 times daily (with meals)    Type 2 diabetes  mellitus with other circulatory complications (H)       naproxen 500 MG tablet    NAPROSYN    180 tablet    TAKE 1 TABLET(500 MG) BY MOUTH TWICE DAILY WITH MEALS AS NEEDED FOR PAIN    Sciatica, unspecified laterality       omeprazole 20 MG tablet    priLOSEC OTC    180 tablet    Take 1 tablet (20 mg) by mouth 2 times daily (before meals)    Esophageal reflux       ranitidine 150 MG tablet    ZANTAC    180 tablet    Take 1 tablet (150 mg) by mouth 2 times daily    Esophageal reflux       ROPINIROLE HYDROCHLORIDE 4 MG Tabs    REQUIP    90 tablet    Take 1 tablet (4 mg) by mouth At Bedtime    Restless leg       traZODone 50 MG tablet    DESYREL    180 tablet    TAKE 2 TABLETS (total 100 MG) BY MOUTH AT BEDTIME    Insomnia, unspecified type

## 2017-07-27 NOTE — PROGRESS NOTES
SUBJECTIVE:                                                    Susi Aguila is a 71 year old female who presents to clinic today for the following health issues:      ED/UC Followup:    Facility:  [facility]  Date of visit: 07/05/2017  Reason for visit: Contipation  Current Status: Tired       Pt's past medical history, family history, habits, medications and allergies were reviewed with the patient today.  See snap shot for  HCM status. Most recent lab results reviewed with pt. Problem list and histories reviewed & adjusted, as indicated.  Additional history as below:    ER note reviewed. 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 Here, she is afebrile, hypertensive but otherwise hemodynamically stable and non toxic appearing. She has very mild generalized abdominal tenderness. No peritoneal signs. White count WNL. No acute electrolyte abnormalities. Lipase WNL. Urine shows bacteria and pyuria, though is contaminated and she is not having any active urinary symptoms thus lower suspicion this represents true cystitis. Will hold off on antibiotic treatment until formal culture results. Urine culture pending. Given one episode of vomiting, though a few days ago, in the setting of her constipation, CT abdomen pelvis obtained to evaluate for obstruction. This showed no signs of obstruction or any other acute intraabdominal processes including diverticulitis, colitis. She felt improved following pink lady enema. Given her improvement and negative work up, I do feel she can safely return home with PCP follow up. We discussed appropriate management of her constipation at home, including starting daily Miralax until completely cleaned out and then stool softener.      BP was also elevated then but back to normal now. HAS been using  "Miralax and BMs now regular with having stopped the Miralax 5 days ago. Denies abdominal pain. Pt denies need for SW now in regards to other issues mentioned in ER note.   Neuropathy pain worse in legs. Denies new back pain. HAs some reduced hearing bilaterally. No ear pain. Using Qtips  Not checking sugars with hx DM  Has issues with staying asleep at night. Some with waking up for no reason (deneis gasping) and some related to noise of apartment above at times    Lab Results   Component Value Date     07/05/2017     Lab Results   Component Value Date    A1C 6.3 03/09/2017     Lab Results   Component Value Date    CHOL 147 09/06/2016     Lab Results   Component Value Date    LDL 45 09/06/2016     Lab Results   Component Value Date    HDL 50 09/06/2016     Lab Results   Component Value Date    TRIG 259 09/06/2016     Lab Results   Component Value Date    CR 0.83 07/05/2017     Lab Results   Component Value Date    ALT 41 07/05/2017     Lab Results   Component Value Date    AST 36 07/05/2017     Lab Results   Component Value Date    MICROL 156 09/06/2016     Lab Results   Component Value Date    TSH 0.20 03/09/2017         Additional ROS:   Constitutional, HEENT, Cardiovascular, Pulmonary, GI and , Neuro, MSK and Psych review of systems/symptoms are otherwise negative or unchanged from previous, except as noted above.      OBJECTIVE:  /84  Pulse 60  Temp 98.5  F (36.9  C) (Oral)  Wt 153 lb (69.4 kg)  SpO2 96%  BMI 27.98 kg/m2   Estimated body mass index is 27.98 kg/(m^2) as calculated from the following:    Height as of 3/9/17: 5' 2\" (1.575 m).    Weight as of this encounter: 153 lb (69.4 kg).  Eye: PERRL, EOMI  HENT: Nose and mouth without ulcers or lesions. Canals initially obstructed bilaterally with cerumen. Following verbal consent, ears cleaned with combination of MD applying water irrigation and  removing wax with ring-tipped probe. No complications. Following this, canals was clear right " but some cerumen remained left canal next to the TM and not able to remove  Neck: no adenopathy. Thyroid normal to palpation. No bruits  Pulm: Lungs clear to auscultation   CV: Regular rates and rhythm  GI: Soft, nontender, Normal active bowel sounds, No hepatosplenomegaly or masses palpable  Ext: Peripheral pulses intact. No edema.   Neuro: Normal strength  RUE and RLE. LLE/LUE hemiparesis. 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 Sitting in WC. Gait not assessed  Back: Mild tenderness to palpation bilateral paralumbar area.      Assessment/Plan: (See plan discussion below for further details)  1. Insomnia, unspecified type  Needs improved control  - traZODone (DESYREL) 50 MG tablet; TAKE 2 TABLETS (total 100 MG) BY MOUTH AT BEDTIME  Dispense: 180 tablet; Refill: 3    2. Neuropathy (H)  Both hx back issues and related to previous stroke. Denies increased back pain. Pt declines further imaging of back. See plan below  - gabapentin (NEURONTIN) 600 MG tablet; 1-1.5 tabs three times a day  Dispense: 405 tablet; Refill: 3    3. Constipation, unspecified constipation type  Improved . See below    4. Bilateral impacted cerumen   Mostly removed but some remains left ear canal. Counselled to avoid Qtips    Plan discussion:  OK to remain off of Miralax for now. If recurrent constipation, then restart daily as needed  Increase trazodone to 50mg tab, 2 tabs at bedtime for insomnia  For leg burning neuropathy, may increase Gabapentin to 600mg tab, 1-1.5 tabs thee times a day  Over the counter Debrox, 4 drops in left ear daily and then irrigate a few minutes later with bulb syringe and lukewarm water for 1 week for wax issue       Logan Briones MD  Internal Medicine Department  [facility]

## 2017-07-27 NOTE — TELEPHONE ENCOUNTER
Patient needs UAUC cathed specimen by aug 4th for her clinic procedures on aug 10th  Spoke to home care and they will be able to do that at the end of next week.  Orders placed.. Libby Henry LPN Staff Nurse

## 2017-07-28 ASSESSMENT — PATIENT HEALTH QUESTIONNAIRE - PHQ9: SUM OF ALL RESPONSES TO PHQ QUESTIONS 1-9: 5

## 2017-07-31 ENCOUNTER — DOCUMENTATION ONLY (OUTPATIENT)
Dept: CARE COORDINATION | Facility: CLINIC | Age: 72
End: 2017-07-31

## 2017-07-31 ENCOUNTER — PRE VISIT (OUTPATIENT)
Dept: UROLOGY | Facility: CLINIC | Age: 72
End: 2017-07-31

## 2017-07-31 PROBLEM — G62.9 NEUROPATHY: Status: ACTIVE | Noted: 2017-07-31

## 2017-07-31 NOTE — TELEPHONE ENCOUNTER
Chart reviewed for upcoming UDS.  Records indicate patient was contacted and told she needs cathed UAUC beforehand and home care says they will do it.

## 2017-07-31 NOTE — PROGRESS NOTES
[facility] now requests orders and shares plan of care/discharge summaries for some patients through [employer].  Please REPLY TO THIS MESSAGE in order to give authorization for orders when needed.  This is considered a verbal order, you will still receive a faxed copy of orders for signature.  Thank you for your assistance in improving collaboration for our patients.    ORDER    Requesting SW visit to facilitate care conference with home care team and patient.  CHINA Kasper

## 2017-08-03 DIAGNOSIS — N39.0 URINARY TRACT INFECTION: ICD-10-CM

## 2017-08-03 LAB
ALBUMIN UR-MCNC: NEGATIVE MG/DL
APPEARANCE UR: CLEAR
BACTERIA #/AREA URNS HPF: ABNORMAL /HPF
BILIRUB UR QL STRIP: NEGATIVE
COLOR UR AUTO: YELLOW
GLUCOSE UR STRIP-MCNC: NEGATIVE MG/DL
HGB UR QL STRIP: NEGATIVE
KETONES UR STRIP-MCNC: NEGATIVE MG/DL
LEUKOCYTE ESTERASE UR QL STRIP: ABNORMAL
NITRATE UR QL: NEGATIVE
PH UR STRIP: 5.5 PH (ref 5–7)
RBC #/AREA URNS AUTO: ABNORMAL /HPF (ref 0–2)
SP GR UR STRIP: 1.01 (ref 1–1.03)
URN SPEC COLLECT METH UR: ABNORMAL
UROBILINOGEN UR STRIP-ACNC: 0.2 EU/DL (ref 0.2–1)
WBC #/AREA URNS AUTO: ABNORMAL /HPF (ref 0–2)

## 2017-08-03 PROCEDURE — 87088 URINE BACTERIA CULTURE: CPT | Performed by: UROLOGY

## 2017-08-03 PROCEDURE — 87186 SC STD MICRODIL/AGAR DIL: CPT | Performed by: UROLOGY

## 2017-08-03 PROCEDURE — 81001 URINALYSIS AUTO W/SCOPE: CPT | Performed by: UROLOGY

## 2017-08-03 PROCEDURE — 87086 URINE CULTURE/COLONY COUNT: CPT | Performed by: UROLOGY

## 2017-08-05 ENCOUNTER — DOCUMENTATION ONLY (OUTPATIENT)
Dept: CARE COORDINATION | Facility: CLINIC | Age: 72
End: 2017-08-05

## 2017-08-05 NOTE — PROGRESS NOTES
[facility] now requests orders and shares plan of care/discharge summaries for some patients through [employer].  Please REPLY TO THIS MESSAGE in order to give authorization for orders when needed.  This is considered a verbal order, you will still receive a faxed copy of orders for signature.  Thank you for your assistance in improving collaboration for our patients.    ORDER    Requesting further home care orders.     SN 1 week x 3, 3 prn visits for medication assessment, fall prevention, skin assessment, GI/ status.     Thank you,   Joanna Mabry, RN  [phone]

## 2017-08-06 LAB
BACTERIA SPEC CULT: ABNORMAL
MICRO REPORT STATUS: ABNORMAL
MICROORGANISM SPEC CULT: ABNORMAL
MICROORGANISM SPEC CULT: ABNORMAL
SPECIMEN SOURCE: ABNORMAL

## 2017-08-08 ENCOUNTER — PRE VISIT (OUTPATIENT)
Dept: UROLOGY | Facility: CLINIC | Age: 72
End: 2017-08-08

## 2017-08-09 DIAGNOSIS — N39.0 URINARY TRACT INFECTION: Primary | ICD-10-CM

## 2017-08-09 RX ORDER — SULFAMETHOXAZOLE/TRIMETHOPRIM 800-160 MG
1 TABLET ORAL 2 TIMES DAILY
Qty: 10 TABLET | Refills: 0 | Status: SHIPPED | OUTPATIENT
Start: 2017-08-09 | End: 2017-08-14

## 2017-08-09 NOTE — NURSING NOTE
Patient notified   She will start medications today and keep her appointment tomorrow    Jessica Archer, RN   Care Coordinator Urology

## 2017-08-10 ENCOUNTER — OFFICE VISIT (OUTPATIENT)
Dept: UROLOGY | Facility: CLINIC | Age: 72
End: 2017-08-10

## 2017-08-10 ENCOUNTER — CARE COORDINATION (OUTPATIENT)
Dept: UROLOGY | Facility: CLINIC | Age: 72
End: 2017-08-10

## 2017-08-10 VITALS
DIASTOLIC BLOOD PRESSURE: 78 MMHG | HEART RATE: 78 BPM | HEIGHT: 64 IN | WEIGHT: 153.5 LBS | BODY MASS INDEX: 26.21 KG/M2 | SYSTOLIC BLOOD PRESSURE: 121 MMHG

## 2017-08-10 DIAGNOSIS — N39.46 MIXED INCONTINENCE: Primary | ICD-10-CM

## 2017-08-10 DIAGNOSIS — N32.81 URGENCY-FREQUENCY SYNDROME: ICD-10-CM

## 2017-08-10 DIAGNOSIS — N32.81 OVERACTIVE DETRUSOR: ICD-10-CM

## 2017-08-10 DIAGNOSIS — N39.41 URGE INCONTINENCE: Primary | ICD-10-CM

## 2017-08-10 DIAGNOSIS — N39.0 RECURRENT UTI: ICD-10-CM

## 2017-08-10 DIAGNOSIS — N39.0 URINARY TRACT INFECTION: Primary | ICD-10-CM

## 2017-08-10 LAB
ALBUMIN SERPL-MCNC: 3.7 G/DL (ref 3.4–5)
ALP SERPL-CCNC: 101 U/L (ref 40–150)
ALT SERPL W P-5'-P-CCNC: 19 U/L (ref 0–50)
ANION GAP SERPL CALCULATED.3IONS-SCNC: 12 MMOL/L (ref 3–14)
APPEARANCE UR: NORMAL
AST SERPL W P-5'-P-CCNC: 6 U/L (ref 0–45)
BILIRUB SERPL-MCNC: 0.3 MG/DL (ref 0.2–1.3)
BILIRUB UR QL: NORMAL
BUN SERPL-MCNC: 27 MG/DL (ref 7–30)
CALCIUM SERPL-MCNC: 9.4 MG/DL (ref 8.5–10.1)
CHLORIDE SERPL-SCNC: 106 MMOL/L (ref 94–109)
CO2 SERPL-SCNC: 21 MMOL/L (ref 20–32)
COLOR UR: YELLOW
CREAT SERPL-MCNC: 1.72 MG/DL (ref 0.52–1.04)
ERYTHROCYTE [DISTWIDTH] IN BLOOD BY AUTOMATED COUNT: 12.7 % (ref 10–15)
GFR SERPL CREATININE-BSD FRML MDRD: 29 ML/MIN/1.7M2
GLUCOSE SERPL-MCNC: 84 MG/DL (ref 70–99)
GLUCOSE URINE: NORMAL MG/DL
HCT VFR BLD AUTO: 41.9 % (ref 35–47)
HGB BLD-MCNC: 13.1 G/DL (ref 11.7–15.7)
HGB UR QL: NORMAL
KETONES UR QL: NORMAL MG/DL
LEUKOCYTE ESTERASE URINE: NORMAL
MCH RBC QN AUTO: 29 PG (ref 26.5–33)
MCHC RBC AUTO-ENTMCNC: 31.3 G/DL (ref 31.5–36.5)
MCV RBC AUTO: 93 FL (ref 78–100)
NITRITE UR QL STRIP: NORMAL
PH UR STRIP: 5 PH (ref 5–7)
PLATELET # BLD AUTO: 257 10E9/L (ref 150–450)
POTASSIUM SERPL-SCNC: 5.1 MMOL/L (ref 3.4–5.3)
PROT SERPL-MCNC: 7.1 G/DL (ref 6.8–8.8)
PROTEIN ALBUMIN URINE: NORMAL MG/DL
RBC # BLD AUTO: 4.52 10E12/L (ref 3.8–5.2)
SODIUM SERPL-SCNC: 139 MMOL/L (ref 133–144)
SOURCE: NORMAL
SP GR UR STRIP: 1.02 (ref 1–1.03)
UROBILINOGEN UR QL STRIP: 0.2 EU/DL (ref 0.2–1)
WBC # BLD AUTO: 8.6 10E9/L (ref 4–11)

## 2017-08-10 RX ORDER — CEPHALEXIN 500 MG/1
500 CAPSULE ORAL 2 TIMES DAILY
Qty: 10 CAPSULE | Refills: 0 | Status: SHIPPED | OUTPATIENT
Start: 2017-08-10 | End: 2017-08-15

## 2017-08-10 RX ORDER — TROSPIUM CHLORIDE ER 60 MG/1
60 CAPSULE ORAL EVERY MORNING
Qty: 30 EACH | Refills: 3 | Status: CANCELLED | OUTPATIENT
Start: 2017-08-10

## 2017-08-10 ASSESSMENT — PAIN SCALES - GENERAL: PAINLEVEL: NO PAIN (0)

## 2017-08-10 NOTE — LETTER
8/10/2017       RE: Susi Aguila  [address]    [facility] [address]     Dear Colleague,    Thank you for referring your patient, Susi Aguila, to the [facility] UROLOGY AND INST FOR PROSTATE AND UROLOGIC CANCERS at [facility]. Please see a copy of my visit note below.    August 10, 2017    Return visit    Patient returns today for follow up.  She was last seen about a year ago.  She had UDS earlier today.  She denies any changes in her health since last visit.    She is comfortable, in no distress, non-labored breathing.  Abdomen is soft, non-tender, non-distended.  Normal external female genitalia.  Negative CST.  Pelvic exam remarkable for myofascial tenderness of the pelvic floor.    Cystoscopy Note: After informed consent was obtained patient was prepped and draped in the standard fashion.  The flexible cystoscope was inserted into a normal appearing urethral meatus.  The urothelium was carefully examined and there was some diffuse erythema consistent with cystitis but there were no tumors, masses, stones, foreign bodies, or other urothelial abnormalities noted.  Bilateral ureteral orifices were noted in the normal orthotopic position and both effluxed clear urine.  The cystoscope was retroflexed and the bladder neck was unremarkable.  The urethra was carefully examined upon removing the cystoscope and was unremarkable.  Patient tolerated the procedure without complications noted.      A/P: 71 year old F with detrusor overactivity, stress induced DOI, small bladder capacity    We discussed that urge incontinence treatments include observation, weight loss, medications most commonly anticholinergics, physical therapy, biofeedback, intravesical botulinum toxin, percutaneous tibial nerve stimulation and sacral neuromodulation.     Patient most interested in trying a medication again.  Want to prescribe Sanctura to minimize cognitive side effects but given that  patient's GFR is low today will await for her recheck.  Hopefully her kidney function will return to normal now that she has stopped the Bactrim.  If GFR returns to normal will recommend Sanctura XR 60mg daily    RTC 6 weeks, sooner if needed.  Check PVR at that time.      Ruby Rodriguez MD MPH   of Urology    CC  Patient Care Team:  Logan Briones MD as PCP - General (Internal Medicine)  Db Chase MD as MD (Urology)  Jayme Schuler MD as MD (Ophthalmology)  MOON MICHAELS

## 2017-08-10 NOTE — LETTER
8/10/2017       RE: Susi Aguila  [address]    [facility] [address]     Dear Colleague,    Thank you for referring your patient, Susi Aguila, to the [facility] UROLOGY AND INST FOR PROSTATE AND UROLOGIC CANCERS at [facility]. Please see a copy of my visit note below.    PREPROCEDURE DIAGNOSES:    1. Mixed urinary incontinence (urge predominant)  2. History of nephrolithiasis  3. History of Tyrel's  4. History of stroke    POSTPROCEDURE DIAGNOSES:  1. Urge and stress-induced urge incontinence   2. History of nephrolithiasis  3. History of Tyrel's  4. History of stroke  5. UDS shows:  -Small bladder capacity (180 mL was max volume we were able to fill to without triggering UDC with large volume incontinence).  -Multiple UDC's throughout filling with incontinence; filling rate was then decreased to 15 mL/min and UDC's continued but were less frequent  -No obvious stress incontinence, but she does demonstrate stress induced DO with incontinence on multiple occasions. UDC's with incontinence also occur in the absence of stress maneuvers.  -Otherwise good compliance between UDC's  -Good bladder emptying (max Pdet with voiding was 22 cm H2O). Some Valsalva/straining at the very end with no additional flow. Final PVR was < 100 mL.  -Sensations: early  -DSD or dysfunctional voiding: none  -Outlet obstruction: none  -VUR: none    PROCEDURE:    1. Uroflowmetry.  2. Sterile urethral catheterization for measurement of postvoid residual urine volume.  3. Complex filling cystometrogram with measurement of bladder and rectal pressures.  4. Complex voiding cystometrogram with measurement of bladder and rectal pressures.  5. Electromyography of the pelvic floor during urodynamics.  6. Fluoroscopic imaging of the bladder during urodynamics, at least 3 views.    7. Interpretation of urodynamics and flouroscopic imaging.      INDICATIONS FOR PROCEDURE:  Ms. Susi Aguila is a  pleasant 71 year old female with a history of stroke and mixed urinary incontinence (urge predominant), as well as a history of nephrolithiasis and Tyrel. She has failed multiple medications and has requested botox injections. Baseline video urodynamic assessment is requested today by Dr. Rodriguez to better characterize Ms. Susi Aguila's voiding dysfunction.      VOIDING DIARY:  No formal diary completed. Per history obtained from patient:  Voids every 30-90 minutes during the day, nocturia x 0-4.  Episodes of incontinence: multiple per day - she reports going through 10 Depends per day.  Incontinence associated with: strong urge, transfers, coughing, sneezing, bending, without awareness.  Total Volume Intake: 2 glasses of water per day, 2-3 cups of coffee, 1 glass of OJ, occasional diet soda  Total Volume Output: unknown    DESCRIPTION OF PROCEDURE:  Risks, benefits, and alternatives to urodynamics were discussed with the patient and she wished to proceed.  Urodynamics are planned to better assess the primary etiology for Ms. Aguila's urologic dysfunction.  The patient does not take anticholinergic medications.  After informed consent was obtained, the patient was taken to the procedure room where uroflowmetry was performed. Findings below.     UROFLOWMETRY:  Voided volume: 53 mL.  Maximum flow rate: 12.0 mL/sec.  Average flow rate: 7.9 mL/sec.  Postvoid residual by catheter: 25 mL.  Character of the curve: blunted bell-shape.  Pretest urine dipstick was negative for nitrites, positive for moderate leukocytes. We discussed the potential risks of proceeding with today's urodynamic study given this + urine dipstick finding. The patient verbalized understanding but wishes to proceed. She is currently taking Bactrim for UTI.     Next a 7F double-lumen urodynamics catheter was inserted into the bladder under sterile technique.  A 7F abdominal manometry catheter was placed in the rectum.  EMG pads were placed on both sides  of the anal verge.  The bladder was filled with 200 mL of Cystografin at 15-25 mL/minute and serial pressures were recorded.  With coughing there was an appropriate rise in vesical and abdominal pressures with no change in detrusor pressure, confirming good study catheter placement.    DURING THE FILLING PHASE:  First sensation: 22 mL.  First Desire: 107 mL.  Strong Desire: 175 mL.  Maximum Capacity: 180 mL.    Uninhibited detrusor contractions: multiple UDC's throughout filling with incontinence; filling rate was then decreased to 15 mL/min and UDC's continued but were less frequent  Compliance: otherwise good between UDC's.   Continence: no obvious stress incontinence, but she does demonstrate stress induced DO with incontinence on multiple occasions. UDC's with incontinence also occur in the absence of stress maneuvers.  EMG: conordant during filling.    DURING THE VOIDING PHASE:  Maximum detrusor contraction with void: 22 cm of H2O pressure.  Voided volume: 21 mL (she had already leaked majority of volume in her bladder)  Maximum flow rate: 6.1 mL/sec.  Average flow rate: 2.1 mL/sec.  Postvoid Residual: < 100 mL.  Detrusor sphincter dyssynergia: none.  Character of voiding curve: blunted bell-shaped.  BOOI: 6 (suggesting no obstruction - see key below)  [obstructed (HAND index [BOOI] ? 40); equivocal (no definite   obstruction; BOOI 20-40); and no obstruction (BOOI ? 20)]    FLUOROSCOPIC IMAGING OF THE BLADDER DURING URODYNAMICS:  Fluoroscopy during today's procedure demonstrated a small, smooth walled bladder without obvious diverticulae or cellules.  No vesicoureteral reflux was observed.  The bladder neck was closed during filling and open during voiding and episodes of incontinence.  After voiding to completion, all catheters were removed and the patient was brought back into the consultation room to further discuss today's study results.      ASSESSMENT/PLAN:  Ms. Susi Aguila is a pleasant 71 year old  female with a history of stroke and mixed urinary incontinence (urge predominant), as well as a history of nephrolithiasis and Tyrel who demonstrated the following findings today on urodynamic evaluation:    -Small bladder capacity (180 mL was max volume we were able to fill to without triggering UDC with large volume incontinence).  -Multiple UDC's throughout filling with incontinence; filling rate was then decreased to 15 mL/min and UDC's continued but were less frequent  -No obvious stress incontinence, but she does demonstrate stress induced DO with incontinence on multiple occasions. UDC's with incontinence also occur in the absence of stress maneuvers.  -Otherwise good compliance between UDC's  -Good bladder emptying (max Pdet with voiding was 22 cm H2O). Some Valsalva/straining at the very end with no additional flow. Final PVR was < 100 mL.  -Sensations: early  -DSD or dysfunctional voiding: none  -Outlet obstruction: none  -VUR: none    The patient will follow up later today with Dr. Rodriguez for cystoscopy and to further discuss today's study results and make plans for how best to proceed.      - As the patient is currently taking Bactrim DS BID x 5 days for UTI, additional UTI prophylaxis was not administered.  The risk of UTI with VUDS is low at ~2.5-3%.      Thank you for allowing me to participate in the care of Ms. Susi Aguila and please don't hesitate to contact me with any questions or concerns.      Leslye Contreras PA-C  Urology Physician Assistant

## 2017-08-10 NOTE — MR AVS SNAPSHOT
After Visit Summary   8/10/2017    Susi Aguila    MRN: [id number]           Patient Information     Date Of Birth          1945        Visit Information        Provider Department      8/10/2017 9:30 AM Leslye Contreras PA-C [facility] Urology and [facility] for Prostate and Urologic Cancers        Today's Diagnoses     Mixed incontinence    -  1    Recurrent UTI           Follow-ups after your visit        Your next 10 appointments already scheduled     Aug 10, 2017  1:00 PM CDT   (Arrive by 12:45 PM)   Cystoscopy with Ruby Rodriguez MD   [facility] Urology and [facility] for Prostate and Urologic Cancers ([facility] [place])    [address] [facility] [address] [facility] [address]  [address] [facility] [address] [phone]            Aug 22, 2017  2:15 PM CDT   New Visit with Dai Cueto PA-C   [facility]    [address] [address] [phone]              Future tests that were ordered for you today     Open Future Orders        Priority Expected Expires Ordered    Comprehensive metabolic panel Routine  8/10/2018 8/10/2017    CBC with platelets Routine  8/10/2018 8/10/2017            Who to contact     Please call your clinic at [phone] to:    Ask questions about your health    Make or cancel appointments    Discuss your medicines    Learn about your test results    Speak to your doctor   If you have compliments or concerns about an experience at your clinic, or if you wish to file a complaint, please contact [facility] Patient Relations at [phone] or email us at [email or web address]         Additional Information About Your Visit        MyChart Information     Inveni is an electronic gateway that provides easy, online access to your medical records. With Inveni, you can request a clinic appointment, read your test results, renew a prescription or  "communicate with your care team.     To sign up for InContext Solutionshart visit the website at [email or web address]   You will be asked to enter the access code listed below, as well as some personal information. Please follow the directions to create your username and password.     Your access code is: [email or web address]  Expires: 2017  6:30 AM     Your access code will  in 90 days. If you need help or a new code, please contact your [facility] or call [phone] for assistance.        Care EveryWhere ID     This is your Care EveryWhere ID. This could be used by other organizations to access your [place] medical records  [id number]        Your Vitals Were     Pulse Height BMI (Body Mass Index)             78 1.626 m (5' 4\") 26.35 kg/m2          Blood Pressure from Last 3 Encounters:   08/10/17 121/78   17 128/84   17 (!) 187/95    Weight from Last 3 Encounters:   08/10/17 69.6 kg (153 lb 8 oz)   17 69.4 kg (153 lb)   17 71.2 kg (157 lb)              We Performed the Following     COMPLEX UROFLOWMETRY     CYSTOMETROGRAM COMPLEX W VOIDING PRESS PROFILE     EMG ANAL/URETH SPHINCTER, OTHER THAN NEEDLE     HC CONTRAST ISOVUE 370 MG/ML, PER ML     Urinalysis chemical screen POCT     VOIDING PRESSURE STUDY, INTRA-ABDOMINAL     X-Ray Voiding cystogram        Primary Care Provider Office Phone # Fax #    Logan Briones [phone]       [address] [place] [address]        Equal Access to Services     [facility] : Hadii aad ku hadasho Soomaali, waaxda luqadaha, qaybta kaalmada aderamos, lala miller. So [facility] [phone].    ATENCIÓN: Si habla español, tiene a cabezas disposición servicios gratuitos de asistencia lingüística. Llame al [phone].    We comply with applicable federal civil rights laws and [place] laws. We do not discriminate on the basis of race, color, national origin, age, disability sex, sexual " orientation or gender identity.            Thank you!     Thank you for choosing [facility] UROLOGY AND [facility] FOR PROSTATE AND UROLOGIC CANCERS  for your care. Our goal is always to provide you with excellent care. Hearing back from our patients is one way we can continue to improve our services. Please take a few minutes to complete the written survey that you may receive in the mail after your visit with us. Thank you!             Your Updated Medication List - Protect others around you: Learn how to safely use, store and throw away your medicines at www.disposemymeds.org.          This list is accurate as of: 8/10/17 11:13 AM.  Always use your most recent med list.                   Brand Name Dispense Instructions for use Diagnosis    acyclovir 200 MG capsule    ZOVIRAX    25 capsule    For 5 days  per herpes outbreak    HSV (herpes simplex virus) infection       atorvastatin 40 MG tablet    LIPITOR    90 tablet    Take 1 tablet (40 mg) by mouth daily    Hyperlipidemia LDL goal <100       cycloSPORINE 0.05 % ophthalmic emulsion    RESTASIS     Place 1 drop into both eyes 3 times daily        fluticasone 50 MCG/ACT spray    FLONASE    3 Package    Spray 1-2 sprays into both nostrils daily if needed    Allergic rhinitis       gabapentin 600 MG tablet    NEURONTIN    405 tablet    1-1.5 tabs three times a day    Neuropathy (H)       levothyroxine 100 MCG tablet    SYNTHROID/LEVOTHROID    90 tablet    TAKE 1 TABLET(100 MCG) BY MOUTH DAILY    Hypothyroidism, unspecified type       lisinopril 20 MG tablet    PRINIVIL/ZESTRIL    90 tablet    Take 1 tablet (20 mg) by mouth daily    Essential hypertension       metFORMIN 1000 MG tablet    GLUCOPHAGE    180 tablet    Take 1 tablet (1,000 mg) by mouth 2 times daily (with meals)    Type 2 diabetes mellitus with other circulatory complications (H)       naproxen 500 MG tablet    NAPROSYN    180 tablet    TAKE 1 TABLET(500 MG) BY MOUTH TWICE DAILY WITH MEALS AS NEEDED FOR PAIN     Sciatica, unspecified laterality       omeprazole 20 MG tablet    priLOSEC OTC    180 tablet    Take 1 tablet (20 mg) by mouth 2 times daily (before meals)    Esophageal reflux       ranitidine 150 MG tablet    ZANTAC    180 tablet    Take 1 tablet (150 mg) by mouth 2 times daily    Esophageal reflux       ROPINIROLE HYDROCHLORIDE 4 MG Tabs    REQUIP    90 tablet    Take 1 tablet (4 mg) by mouth At Bedtime    Restless leg       sulfamethoxazole-trimethoprim 800-160 MG per tablet    BACTRIM DS/SEPTRA DS    10 tablet    Take 1 tablet by mouth 2 times daily for 5 days    Urinary tract infection       traZODone 50 MG tablet    DESYREL    180 tablet    TAKE 2 TABLETS (total 100 MG) BY MOUTH AT BEDTIME    Insomnia, unspecified type

## 2017-08-10 NOTE — PROGRESS NOTES
Patient seen for urodynamics and cystoscopy with Dr. Rodriguez today. Had + UC from 8/3/17 for which she was started on Bactrim DS yesterday, 8/10/17. Unfortunately, patient thought she was supposed to take the entire course of antibiotics prior to today's appointments (for unknown reason as Rx sig clearly stated take 1 tablet BID) so she took 5 tablets PO yesterday and 1 tablet PO this morning. She reports feeling fine overall. Sent for labs after today's appointments to check kidney function, blood counts, electrolytes, etc. and results indicate creatinine is elevated to 1.72 (up from baseline of 0.8). As she needs to complete a course of antibiotics for UTI, would advise switching from Bactrim to something less nephrotoxic. Will therefore switch patient to Keflex 500 mg BID x 5 days. Also encouraged to stay well hydrated by drinking plenty of water and avoiding NSAID's. Patient should then plan to follow up with her primary care doctor to recheck creatinine within a few weeks.    Tresa - see the above note. Could you call patient with the following recommendations:  1) STOP Bactim  2) START Keflex 500 mg BID x 5 days. Please confirm preferred pharmacy and send. She should start her first dose tonight, then BID for the remainder.  3) Drink at least 6-8 glasses of water per day.  4) Avoid NSAID's such as ibuprofen, naproxen, etc.  5) Follow up with PCP within a few weeks to a month to recheck kidney function.    Thank you,  Leslye Contreras PA-C  Urology

## 2017-08-10 NOTE — MR AVS SNAPSHOT
After Visit Summary   8/10/2017    Susi Aguila    MRN: [id number]           Patient Information     Date Of Birth          1945        Visit Information        Provider Department      8/10/2017 1:00 PM Ruby Rodriguez MD [facility] Urology and [facility] for Prostate and Urologic Cancers        Today's Diagnoses     Urge incontinence    -  1      Care Instructions    Please take the medication    Please return in about 6 weeks for us to check how the medication is working and how well you are emptying your bladder          Follow-ups after your visit        Follow-up notes from your care team     Return in about 6 weeks (around 9/21/2017).      Your next 10 appointments already scheduled     Aug 10, 2017  1:00 PM CDT   (Arrive by 12:45 PM)   Cystoscopy with Ruby Rodriguez MD   [facility] Urology and [facility] for Prostate and Urologic Cancers ([facility])    [address] [place] [id number] [phone]            Aug 22, 2017  2:15 PM CDT   New Visit with Dai Cueto PA-C   [facility] ([facility])    [address] [address] [phone]            Oct 07, 2017  8:30 AM CDT   (Arrive by 8:15 AM)   Return Visit with Ruby Rodriguez MD   [facility] Urology and [facility] for Prostate and Urologic Cancers ([facility])    [address] [place] [id number] [phone]              Future tests that were ordered for you today     Open Future Orders        Priority Expected Expires Ordered    Comprehensive metabolic panel Routine  8/10/2018 8/10/2017    CBC with platelets Routine  8/10/2018 8/10/2017            Who to contact     Please call your clinic at [phone] to:    Ask questions about your health    Make or cancel appointments    Discuss your medicines    Learn about your test results    Speak to your  doctor   If you have compliments or concerns about an experience at your clinic, or if you wish to file a complaint, please contact [facility] Patient Relations at [phone] or email us at [email or web address]         Additional Information About Your Visit        [email or web address] Information     [employer]ott is an electronic gateway that provides easy, online access to your medical records. With Evento, you can request a clinic appointment, read your test results, renew a prescription or communicate with your care team.     To sign up for Evento visit the website at [email or web address]   You will be asked to enter the access code listed below, as well as some personal information. Please follow the directions to create your username and password.     Your access code is: [email or web address]  Expires: 2017  6:30 AM     Your access code will  in 90 days. If you need help or a new code, please contact your [facility] or call [phone] for assistance.        Care EveryWhere ID     This is your Care EveryWhere ID. This could be used by other organizations to access your [place] medical records  [id number]         Blood Pressure from Last 3 Encounters:   08/10/17 121/78   17 128/84   17 (!) 187/95    Weight from Last 3 Encounters:   08/10/17 69.6 kg (153 lb 8 oz)   17 69.4 kg (153 lb)   17 71.2 kg (157 lb)              Today, you had the following     No orders found for display       Primary Care Provider Office Phone # Fax #    Logan Briones [phone]       [address] [facility] [address]        Equal Access to Services     [place] : Suma Mehta, waluana smith, katherin kaalmada cherise, lala miller. So [facility] [phone].    ATENCIÓN: Si habla español, tiene a cabezas disposición servicios gratuitos de asistencia lingüística. Llame al  [phone].    We comply with applicable federal civil rights laws and [place] laws. We do not discriminate on the basis of race, color, national origin, age, disability sex, sexual orientation or gender identity.            Thank you!     Thank you for choosing [facility] UROLOGY AND [facility] FOR PROSTATE AND UROLOGIC CANCERS  for your care. Our goal is always to provide you with excellent care. Hearing back from our patients is one way we can continue to improve our services. Please take a few minutes to complete the written survey that you may receive in the mail after your visit with us. Thank you!             Your Updated Medication List - Protect others around you: Learn how to safely use, store and throw away your medicines at www.disposemymeds.org.          This list is accurate as of: 8/10/17 11:58 AM.  Always use your most recent med list.                   Brand Name Dispense Instructions for use Diagnosis    acyclovir 200 MG capsule    ZOVIRAX    25 capsule    For 5 days  per herpes outbreak    HSV (herpes simplex virus) infection       atorvastatin 40 MG tablet    LIPITOR    90 tablet    Take 1 tablet (40 mg) by mouth daily    Hyperlipidemia LDL goal <100       cycloSPORINE 0.05 % ophthalmic emulsion    RESTASIS     Place 1 drop into both eyes 3 times daily        fluticasone 50 MCG/ACT spray    FLONASE    3 Package    Spray 1-2 sprays into both nostrils daily if needed    Allergic rhinitis       gabapentin 600 MG tablet    NEURONTIN    405 tablet    1-1.5 tabs three times a day    Neuropathy (H)       levothyroxine 100 MCG tablet    SYNTHROID/LEVOTHROID    90 tablet    TAKE 1 TABLET(100 MCG) BY MOUTH DAILY    Hypothyroidism, unspecified type       lisinopril 20 MG tablet    PRINIVIL/ZESTRIL    90 tablet    Take 1 tablet (20 mg) by mouth daily    Essential hypertension       metFORMIN 1000 MG tablet    GLUCOPHAGE    180 tablet    Take 1 tablet (1,000 mg) by mouth 2 times daily (with meals)    Type 2  diabetes mellitus with other circulatory complications (H)       naproxen 500 MG tablet    NAPROSYN    180 tablet    TAKE 1 TABLET(500 MG) BY MOUTH TWICE DAILY WITH MEALS AS NEEDED FOR PAIN    Sciatica, unspecified laterality       omeprazole 20 MG tablet    priLOSEC OTC    180 tablet    Take 1 tablet (20 mg) by mouth 2 times daily (before meals)    Esophageal reflux       ranitidine 150 MG tablet    ZANTAC    180 tablet    Take 1 tablet (150 mg) by mouth 2 times daily    Esophageal reflux       ROPINIROLE HYDROCHLORIDE 4 MG Tabs    REQUIP    90 tablet    Take 1 tablet (4 mg) by mouth At Bedtime    Restless leg       sulfamethoxazole-trimethoprim 800-160 MG per tablet    BACTRIM DS/SEPTRA DS    10 tablet    Take 1 tablet by mouth 2 times daily for 5 days    Urinary tract infection       traZODone 50 MG tablet    DESYREL    180 tablet    TAKE 2 TABLETS (total 100 MG) BY MOUTH AT BEDTIME    Insomnia, unspecified type

## 2017-08-10 NOTE — PROGRESS NOTES
Notified patient of recommendations from physician.  Patient verbalized understanding. No further questions.   RX sent to patient's pharmacy.      Tresa Escobar, ALIZE-BC, BSN  Care Coordinator - Reconstructive Urology

## 2017-08-10 NOTE — PROGRESS NOTES
PREPROCEDURE DIAGNOSES:    1. Mixed urinary incontinence (urge predominant)  2. History of nephrolithiasis  3. History of Tyrel's  4. History of stroke    POSTPROCEDURE DIAGNOSES:  1. Urge and stress-induced urge incontinence   2. History of nephrolithiasis  3. History of Tyrel's  4. History of stroke  5. UDS shows:  -Small bladder capacity (180 mL was max volume we were able to fill to without triggering UDC with large volume incontinence).  -Multiple UDC's throughout filling with incontinence; filling rate was then decreased to 15 mL/min and UDC's continued but were less frequent  -No obvious stress incontinence, but she does demonstrate stress induced DO with incontinence on multiple occasions. UDC's with incontinence also occur in the absence of stress maneuvers.  -Otherwise good compliance between UDC's  -Good bladder emptying (max Pdet with voiding was 22 cm H2O). Some Valsalva/straining at the very end with no additional flow. Final PVR was < 100 mL.  -Sensations: early  -DSD or dysfunctional voiding: none  -Outlet obstruction: none  -VUR: none    PROCEDURE:    1. Uroflowmetry.  2. Sterile urethral catheterization for measurement of postvoid residual urine volume.  3. Complex filling cystometrogram with measurement of bladder and rectal pressures.  4. Complex voiding cystometrogram with measurement of bladder and rectal pressures.  5. Electromyography of the pelvic floor during urodynamics.  6. Fluoroscopic imaging of the bladder during urodynamics, at least 3 views.    7. Interpretation of urodynamics and flouroscopic imaging.      INDICATIONS FOR PROCEDURE:  Ms. Susi Aguila is a pleasant 71 year old female with a history of stroke and mixed urinary incontinence (urge predominant), as well as a history of nephrolithiasis and Tyrel. She has failed multiple medications and has requested botox injections. Baseline video urodynamic assessment is requested today by Dr. Rodriguez to better characterize Ms. Susi WILLIS  Mingo's voiding dysfunction.      VOIDING DIARY:  No formal diary completed. Per history obtained from patient:  Voids every 30-90 minutes during the day, nocturia x 0-4.  Episodes of incontinence: multiple per day - she reports going through 10 Depends per day.  Incontinence associated with: strong urge, transfers, coughing, sneezing, bending, without awareness.  Total Volume Intake: 2 glasses of water per day, 2-3 cups of coffee, 1 glass of OJ, occasional diet soda  Total Volume Output: unknown    DESCRIPTION OF PROCEDURE:  Risks, benefits, and alternatives to urodynamics were discussed with the patient and she wished to proceed.  Urodynamics are planned to better assess the primary etiology for Ms. Aguila's urologic dysfunction.  The patient does not take anticholinergic medications.  After informed consent was obtained, the patient was taken to the procedure room where uroflowmetry was performed. Findings below.     UROFLOWMETRY:  Voided volume: 53 mL.  Maximum flow rate: 12.0 mL/sec.  Average flow rate: 7.9 mL/sec.  Postvoid residual by catheter: 25 mL.  Character of the curve: blunted bell-shape.  Pretest urine dipstick was negative for nitrites, positive for moderate leukocytes. We discussed the potential risks of proceeding with today's urodynamic study given this + urine dipstick finding. The patient verbalized understanding but wishes to proceed. She is currently taking Bactrim for UTI.     Next a 7F double-lumen urodynamics catheter was inserted into the bladder under sterile technique.  A 7F abdominal manometry catheter was placed in the rectum.  EMG pads were placed on both sides of the anal verge.  The bladder was filled with 200 mL of Cystografin at 15-25 mL/minute and serial pressures were recorded.  With coughing there was an appropriate rise in vesical and abdominal pressures with no change in detrusor pressure, confirming good study catheter placement.    DURING THE FILLING PHASE:  First sensation:  22 mL.  First Desire: 107 mL.  Strong Desire: 175 mL.  Maximum Capacity: 180 mL.    Uninhibited detrusor contractions: multiple UDC's throughout filling with incontinence; filling rate was then decreased to 15 mL/min and UDC's continued but were less frequent  Compliance: otherwise good between UDC's.   Continence: no obvious stress incontinence, but she does demonstrate stress induced DO with incontinence on multiple occasions. UDC's with incontinence also occur in the absence of stress maneuvers.  EMG: conordant during filling.    DURING THE VOIDING PHASE:  Maximum detrusor contraction with void: 22 cm of H2O pressure.  Voided volume: 21 mL (she had already leaked majority of volume in her bladder)  Maximum flow rate: 6.1 mL/sec.  Average flow rate: 2.1 mL/sec.  Postvoid Residual: < 100 mL.  Detrusor sphincter dyssynergia: none.  Character of voiding curve: blunted bell-shaped.  BOOI: 6 (suggesting no obstruction - see key below)  [obstructed (HAND index [BOOI] ? 40); equivocal (no definite   obstruction; BOOI 20-40); and no obstruction (BOOI ? 20)]    FLUOROSCOPIC IMAGING OF THE BLADDER DURING URODYNAMICS:  Fluoroscopy during today's procedure demonstrated a small, smooth walled bladder without obvious diverticulae or cellules.  No vesicoureteral reflux was observed.  The bladder neck was closed during filling and open during voiding and episodes of incontinence.  After voiding to completion, all catheters were removed and the patient was brought back into the consultation room to further discuss today's study results.      ASSESSMENT/PLAN:  Ms. Susi Aguila is a pleasant 71 year old female with a history of stroke and mixed urinary incontinence (urge predominant), as well as a history of nephrolithiasis and Tyrel who demonstrated the following findings today on urodynamic evaluation:    -Small bladder capacity (180 mL was max volume we were able to fill to without triggering UDC with large volume  incontinence).  -Multiple UDC's throughout filling with incontinence; filling rate was then decreased to 15 mL/min and UDC's continued but were less frequent  -No obvious stress incontinence, but she does demonstrate stress induced DO with incontinence on multiple occasions. UDC's with incontinence also occur in the absence of stress maneuvers.  -Otherwise good compliance between UDC's  -Good bladder emptying (max Pdet with voiding was 22 cm H2O). Some Valsalva/straining at the very end with no additional flow. Final PVR was < 100 mL.  -Sensations: early  -DSD or dysfunctional voiding: none  -Outlet obstruction: none  -VUR: none    The patient will follow up later today with Dr. Rodriguez for cystoscopy and to further discuss today's study results and make plans for how best to proceed.      - As the patient is currently taking Bactrim DS BID x 5 days for UTI, additional UTI prophylaxis was not administered.  The risk of UTI with VUDS is low at ~2.5-3%.      Thank you for allowing me to participate in the care of Ms. Susi Aguila and please don't hesitate to contact me with any questions or concerns.      Leslye Contreras PA-C  Urology Physician Assistant

## 2017-08-10 NOTE — PROGRESS NOTES
August 10, 2017    Return visit    Patient returns today for follow up.  She was last seen about a year ago.  She had UDS earlier today.  She denies any changes in her health since last visit.    She is comfortable, in no distress, non-labored breathing.  Abdomen is soft, non-tender, non-distended.  Normal external female genitalia.  Negative CST.  Pelvic exam remarkable for myofascial tenderness of the pelvic floor.    Cystoscopy Note: After informed consent was obtained patient was prepped and draped in the standard fashion.  The flexible cystoscope was inserted into a normal appearing urethral meatus.  The urothelium was carefully examined and there was some diffuse erythema consistent with cystitis but there were no tumors, masses, stones, foreign bodies, or other urothelial abnormalities noted.  Bilateral ureteral orifices were noted in the normal orthotopic position and both effluxed clear urine.  The cystoscope was retroflexed and the bladder neck was unremarkable.  The urethra was carefully examined upon removing the cystoscope and was unremarkable.  Patient tolerated the procedure without complications noted.      A/P: 71 year old F with detrusor overactivity, stress induced DOI, small bladder capacity    We discussed that urge incontinence treatments include observation, weight loss, medications most commonly anticholinergics, physical therapy, biofeedback, intravesical botulinum toxin, percutaneous tibial nerve stimulation and sacral neuromodulation.     Patient most interested in trying a medication again.  Want to prescribe Sanctura to minimize cognitive side effects but given that patient's GFR is low today will await for her recheck.  Hopefully her kidney function will return to normal now that she has stopped the Bactrim.  If GFR returns to normal will recommend Sanctura XR 60mg daily    RTC 6 weeks, sooner if needed.  Check PVR at that time.      Ruby Rodriguez MD MPH   of  Urology    CC  Patient Care Team:  Logan Briones MD as PCP - General (Internal Medicine)  Db Chase MD as MD (Urology)  Jayme Schuler MD as MD (Ophthalmology)  Logan Briones MD as MD (Internal Medicine)  Ruby Rodriguez MD as MD (Urology)  MOON MICHAELS

## 2017-08-16 ENCOUNTER — TELEPHONE (OUTPATIENT)
Dept: NURSING | Facility: CLINIC | Age: 72
End: 2017-08-16

## 2017-08-29 ENCOUNTER — TELEPHONE (OUTPATIENT)
Dept: INTERNAL MEDICINE | Facility: CLINIC | Age: 72
End: 2017-08-29

## 2017-08-29 NOTE — TELEPHONE ENCOUNTER
So RN with [facility] called to update that patient is d/c from skilled nursing today but PT is still involved.

## 2017-09-11 DIAGNOSIS — G25.81 RESTLESS LEG: ICD-10-CM

## 2017-09-12 RX ORDER — ROPINIROLE 4 MG/1
TABLET, FILM COATED ORAL
Qty: 90 TABLET | Refills: 2 | Status: SHIPPED | OUTPATIENT
Start: 2017-09-12 | End: 2018-01-26

## 2017-09-28 ENCOUNTER — OFFICE VISIT (OUTPATIENT)
Dept: DERMATOLOGY | Facility: CLINIC | Age: 72
End: 2017-09-28
Payer: MEDICARE

## 2017-09-28 VITALS — BODY MASS INDEX: 26.12 KG/M2 | HEIGHT: 64 IN | WEIGHT: 153 LBS | HEART RATE: 80 BPM

## 2017-09-28 DIAGNOSIS — E03.9 HYPOTHYROIDISM, UNSPECIFIED TYPE: ICD-10-CM

## 2017-09-28 DIAGNOSIS — E11.59 TYPE 2 DIABETES MELLITUS WITH OTHER CIRCULATORY COMPLICATIONS (H): ICD-10-CM

## 2017-09-28 DIAGNOSIS — L30.9 ACUTE DERMATITIS: Primary | ICD-10-CM

## 2017-09-28 DIAGNOSIS — L82.1 SEBORRHEIC KERATOSIS: ICD-10-CM

## 2017-09-28 LAB
ALBUMIN SERPL-MCNC: 3.3 G/DL (ref 3.4–5)
ALP SERPL-CCNC: 90 U/L (ref 40–150)
ALT SERPL W P-5'-P-CCNC: 14 U/L (ref 0–50)
ANION GAP SERPL CALCULATED.3IONS-SCNC: 7 MMOL/L (ref 3–14)
AST SERPL W P-5'-P-CCNC: 9 U/L (ref 0–45)
BILIRUB SERPL-MCNC: 0.4 MG/DL (ref 0.2–1.3)
BUN SERPL-MCNC: 15 MG/DL (ref 7–30)
CALCIUM SERPL-MCNC: 9.5 MG/DL (ref 8.5–10.1)
CHLORIDE SERPL-SCNC: 110 MMOL/L (ref 94–109)
CHOLEST SERPL-MCNC: 224 MG/DL
CO2 SERPL-SCNC: 26 MMOL/L (ref 20–32)
CREAT SERPL-MCNC: 0.85 MG/DL (ref 0.52–1.04)
GFR SERPL CREATININE-BSD FRML MDRD: 66 ML/MIN/1.7M2
GLUCOSE SERPL-MCNC: 115 MG/DL (ref 70–99)
HBA1C MFR BLD: 6.8 % (ref 4.3–6)
HDLC SERPL-MCNC: 41 MG/DL
LDLC SERPL CALC-MCNC: 143 MG/DL
NONHDLC SERPL-MCNC: 183 MG/DL
POTASSIUM SERPL-SCNC: 4 MMOL/L (ref 3.4–5.3)
PROT SERPL-MCNC: 7.6 G/DL (ref 6.8–8.8)
SODIUM SERPL-SCNC: 143 MMOL/L (ref 133–144)
T4 FREE SERPL-MCNC: 0.77 NG/DL (ref 0.76–1.46)
TRIGL SERPL-MCNC: 201 MG/DL
TSH SERPL DL<=0.005 MIU/L-ACNC: 7.04 MU/L (ref 0.4–4)

## 2017-09-28 PROCEDURE — 80053 COMPREHEN METABOLIC PANEL: CPT | Performed by: INTERNAL MEDICINE

## 2017-09-28 PROCEDURE — 84443 ASSAY THYROID STIM HORMONE: CPT | Performed by: INTERNAL MEDICINE

## 2017-09-28 PROCEDURE — 84439 ASSAY OF FREE THYROXINE: CPT | Performed by: INTERNAL MEDICINE

## 2017-09-28 PROCEDURE — 83036 HEMOGLOBIN GLYCOSYLATED A1C: CPT | Performed by: INTERNAL MEDICINE

## 2017-09-28 PROCEDURE — 80061 LIPID PANEL: CPT | Performed by: INTERNAL MEDICINE

## 2017-09-28 PROCEDURE — 36415 COLL VENOUS BLD VENIPUNCTURE: CPT | Performed by: INTERNAL MEDICINE

## 2017-09-28 PROCEDURE — 99213 OFFICE O/P EST LOW 20 MIN: CPT | Performed by: PHYSICIAN ASSISTANT

## 2017-09-28 RX ORDER — TACROLIMUS 1 MG/G
OINTMENT TOPICAL
Qty: 60 G | Refills: 3 | Status: SHIPPED | OUTPATIENT
Start: 2017-09-28 | End: 2017-10-20

## 2017-09-28 NOTE — MR AVS SNAPSHOT
"              After Visit Summary   9/28/2017    Susi Aguila    MRN: [id number]           Patient Information     Date Of Birth          1945        Visit Information        Provider Department      9/28/2017 10:45 AM Dai Cueto PA-C [facility]        Today's Diagnoses     Acute dermatitis    -  1    Seborrheic keratosis           Follow-ups after your visit        Your next 10 appointments already scheduled     Oct 07, 2017  8:30 AM CDT   (Arrive by 8:15 AM)   Return Visit with Ruby Rodriguez MD   [facility] ([facility])    [address] [facility] [address] [phone]              Who to contact     If you have questions or need follow up information about today's clinic visit or your schedule please contact [facility] directly at [phone].  Normal or non-critical lab and imaging results will be communicated to you by MyChart, letter or phone within 4 business days after the clinic has received the results. If you do not hear from us within 7 days, please contact the clinic through [employer] or phone. If you have a critical or abnormal lab result, we will notify you by phone as soon as possible.  Submit refill requests through [employer] or call your pharmacy and they will forward the refill request to us. Please allow 3 business days for your refill to be completed.          Additional Information About Your Visit        MyChart Information     [employer] lets you send messages to your doctor, view your test results, renew your prescriptions, schedule appointments and more. To sign up, go to [email or web address] . Click on \"Log in\" on the left side of the screen, which will take you to the Welcome page. Then click on \"Sign up Now\" on the right side of the page.     You will be asked to enter the access code listed below, as well as some " "personal information. Please follow the directions to create your username and password.     Your access code is: [email or web address]  Expires: 2017  6:30 AM     Your access code will  in 90 days. If you need help or a new code, please call your [place] clinic or [phone].        Care EveryWhere ID     This is your Care EveryWhere ID. This could be used by other organizations to access your [place] medical records  [id number]        Your Vitals Were     Pulse Height BMI (Body Mass Index)             80 1.626 m (5' 4\") 26.26 kg/m2          Blood Pressure from Last 3 Encounters:   08/10/17 121/78   17 128/84   17 (!) 187/95    Weight from Last 3 Encounters:   17 69.4 kg (153 lb)   08/10/17 69.6 kg (153 lb 8 oz)   17 69.4 kg (153 lb)              Today, you had the following     No orders found for display         Today's Medication Changes          These changes are accurate as of: 17 12:25 PM.  If you have any questions, ask your nurse or doctor.               Start taking these medicines.        Dose/Directions    tacrolimus 0.1 % ointment   Commonly known as:  PROTOPIC   Used for:  Acute dermatitis   Started by:  Dai Cueto PA-C        Apply to face BID PRN   Quantity:  60 g   Refills:  3            Where to get your medicines      These medications were sent to [facility] [address] [address]     Phone:  [phone]     tacrolimus 0.1 % ointment                Primary Care Provider Office Phone # Fax #    Logan Briones [phone]       [address] [place] [address]        Equal Access to Services     GABRIEL VICNENT : Suma Mehta, waaxda luqadaha, qaybta kaalmada cherise, lala miller. So [facility] [phone].    ATENCIÓN: Si habla español, tiene a cabezas disposición servicios gratuitos de asistencia lingüística. Llame al " [phone].    We comply with applicable federal civil rights laws and [place] laws. We do not discriminate on the basis of race, color, national origin, age, disability sex, sexual orientation or gender identity.            Thank you!     Thank you for choosing [facility]  for your care. Our goal is always to provide you with excellent care. Hearing back from our patients is one way we can continue to improve our services. Please take a few minutes to complete the written survey that you may receive in the mail after your visit with us. Thank you!             Your Updated Medication List - Protect others around you: Learn how to safely use, store and throw away your medicines at www.disposemymeds.org.          This list is accurate as of: 9/28/17 12:25 PM.  Always use your most recent med list.                   Brand Name Dispense Instructions for use Diagnosis    acyclovir 200 MG capsule    ZOVIRAX    25 capsule    For 5 days  per herpes outbreak    HSV (herpes simplex virus) infection       atorvastatin 40 MG tablet    LIPITOR    90 tablet    Take 1 tablet (40 mg) by mouth daily    Hyperlipidemia LDL goal <100       cycloSPORINE 0.05 % ophthalmic emulsion    RESTASIS     Place 1 drop into both eyes 3 times daily        fluticasone 50 MCG/ACT spray    FLONASE    3 Package    Spray 1-2 sprays into both nostrils daily if needed    Allergic rhinitis       gabapentin 600 MG tablet    NEURONTIN    405 tablet    1-1.5 tabs three times a day    Neuropathy (H)       levothyroxine 100 MCG tablet    SYNTHROID/LEVOTHROID    90 tablet    TAKE 1 TABLET(100 MCG) BY MOUTH DAILY    Hypothyroidism, unspecified type       lisinopril 20 MG tablet    PRINIVIL/ZESTRIL    90 tablet    Take 1 tablet (20 mg) by mouth daily    Essential hypertension       metFORMIN 1000 MG tablet    GLUCOPHAGE    180 tablet    Take 1 tablet (1,000 mg) by mouth 2 times daily (with meals)    Type 2 diabetes mellitus with other  circulatory complications (H)       naproxen 500 MG tablet    NAPROSYN    180 tablet    TAKE 1 TABLET(500 MG) BY MOUTH TWICE DAILY WITH MEALS AS NEEDED FOR PAIN    Sciatica, unspecified laterality       omeprazole 20 MG tablet    priLOSEC OTC    180 tablet    Take 1 tablet (20 mg) by mouth 2 times daily (before meals)    Esophageal reflux       ranitidine 150 MG tablet    ZANTAC    180 tablet    Take 1 tablet (150 mg) by mouth 2 times daily    Esophageal reflux       ROPINIROLE HYDROCHLORIDE 4 MG Tabs     90 tablet    TAKE 1 TABLET(4 MG) BY MOUTH AT BEDTIME    Restless leg       tacrolimus 0.1 % ointment    PROTOPIC    60 g    Apply to face BID PRN    Acute dermatitis       traZODone 50 MG tablet    DESYREL    180 tablet    TAKE 2 TABLETS (total 100 MG) BY MOUTH AT BEDTIME    Insomnia, unspecified type

## 2017-09-28 NOTE — NURSING NOTE
"Chief Complaint   Patient presents with     Derm Problem       Initial Pulse 80  Ht 1.626 m (5' 4\")  Wt 69.4 kg (153 lb)  BMI 26.26 kg/m2 Estimated body mass index is 26.26 kg/(m^2) as calculated from the following:    Height as of this encounter: 1.626 m (5' 4\").    Weight as of this encounter: 69.4 kg (153 lb).  Medication Reconciliation: complete    "

## 2017-09-28 NOTE — PROGRESS NOTES
HPI:   Susi Aguila is a 71 year old female who presents for evaluation of some new spots on the face  chief complaint  Location: face - right cheek  Condition present for:  Few months.   Previous treatments include: none    Review Of Systems  Eyes: negative  Ears/Nose/Throat: negative  Respiratory: No shortness of breath, dyspnea on exertion, cough, or hemoptysis  Cardiovascular: negative  Gastrointestinal: negative  Genitourinary: negative  Musculoskeletal: negative  Neurologic: negative  Psychiatric: negative    In between homes at the moment - was not allowed to renew her lease at her old place and is still looking for a new place. Very stressful for her.     PHYSICAL EXAM:      Skin exam performed as follows: Type 2 skin. Mood appropriate  Alert and Oriented X 3. Well developed, well nourished in no distress.  General appearance: Normal  Head including face: Normal  Eyes: conjunctiva and lids: Normal  Mouth: Lips, teeth, gums: Normal  Neck: Normal  Chest-breast/axillae: Normal  Back: Normal  Spleen and liver: Normal  Cardiovascular: Exam of peripheral vascular system by observation for swelling, varicosities, edema: Normal  Genitalia: groin, buttocks: Normal  Extremities: digits/nails (clubbing): Normal  Eccrine and Apocrine glands: Normal  Right upper extremity: Normal  Left upper extremity: Normal  Right lower extremity: Normal  Left lower extremity: Normal  Skin: Scalp and body hair: See below    1. Keratotic papules on right cheek x 3    ASSESSMENT/PLAN:     1. Seborrheic keratoses on right cheek x 3 - advised benign no treatment needed  2. Has itching on the face - start protopic ointment BID PRN        Follow-up: FSE/PRN sooner  CC:   Scribed By: Dai Cueto, MS, PA-C

## 2017-10-02 ENCOUNTER — PRE VISIT (OUTPATIENT)
Dept: UROLOGY | Facility: CLINIC | Age: 72
End: 2017-10-02

## 2017-10-02 NOTE — TELEPHONE ENCOUNTER
Pt coming in for medication follow up for incontinence. All records available. Pt will need a PVR.

## 2017-10-19 ENCOUNTER — ALLIED HEALTH/NURSE VISIT (OUTPATIENT)
Dept: UROLOGY | Facility: CLINIC | Age: 72
End: 2017-10-19
Payer: MEDICARE

## 2017-10-19 DIAGNOSIS — R32 URINARY INCONTINENCE: Primary | ICD-10-CM

## 2017-10-19 PROCEDURE — 51798 US URINE CAPACITY MEASURE: CPT | Performed by: UROLOGY

## 2017-10-19 NOTE — NURSING NOTE
Chief Complaint   Patient presents with     Post Void Residual     Patient is here for bladder scan.      Carlie Harris LPN 3:25 PM October 19, 2017     Susi LAZARO Aguila comes into clinic today at the request of Dr Rodriguez Ordering Provider for Dr. Rodriguez.    This service provided today was under the supervising provider of the ryan Melendez, who was available if needed.    Reason for visit: PVR    Carlie Harris LPN     PVR was 68 cc's

## 2017-10-19 NOTE — MR AVS SNAPSHOT
"              After Visit Summary   10/19/2017    Susi Aguila    MRN: [id number]           Patient Information     Date Of Birth          1945        Visit Information        Provider Department      10/19/2017 2:00 PM [facility] NURSE [facility] [place]        Today's Diagnoses     Urinary incontinence    -  1       Follow-ups after your visit        Your next 10 appointments already scheduled     Oct 20, 2017 10:00 AM CDT   [facility]    [address] [place] [address]  3rd [facility] [address] [phone]           Note: this is not an onsite visit; there is no need to come to the facility.              Who to contact     If you have questions or need follow up information about today's clinic visit or your schedule please contact [facility] directly at [phone].  Normal or non-critical lab and imaging results will be communicated to you by Qikhart, letter or phone within 4 business days after the clinic has received the results. If you do not hear from us within 7 days, please contact the clinic through Qikhart or phone. If you have a critical or abnormal lab result, we will notify you by phone as soon as possible.  Submit refill requests through [employer] or call your pharmacy and they will forward the refill request to us. Please allow 3 business days for your refill to be completed.          Additional Information About Your Visit        [email or web address] Information     [employer] lets you send messages to your doctor, view your test results, renew your prescriptions, schedule appointments and more. To sign up, go to [email or web address] . Click on \"Log in\" on the left side of the screen, which will take you to the Welcome page. Then click on \"Sign up Now\" on the right side of the page.     You will be asked to enter the access " code listed below, as well as some personal information. Please follow the directions to create your username and password.     Your access code is: [email or web address]  Expires: 2017  6:30 AM     Your access code will  in 90 days. If you need help or a new code, please call your [facility] or [phone].        Care EveryWhere ID     This is your Care EveryWhere ID. This could be used by other organizations to access your [facility] medical records  [id number]         Blood Pressure from Last 3 Encounters:   08/10/17 121/78   17 128/84   17 (!) 187/95    Weight from Last 3 Encounters:   17 69.4 kg (153 lb)   08/10/17 69.6 kg (153 lb 8 oz)   17 69.4 kg (153 lb)              We Performed the Following     MEASURE POST-VOID RESIDUAL URINE/BLADDER CAPACITY, US NON-IMAGING        Primary Care Provider Office Phone # Fax #    Logan Briones [phone]       [address] [place] [id number]        Equal Access to Services     [facility]: Hadii aad ku hadasho Soomaali, waaxda luqadaha, qaybta kaalmada adeegyada, lala robles haytre betts . So [facility] [phone].    ATENCIÓN: Si habla español, tiene a cabezas disposición servicios gratuitos de asistencia lingüística. Llame al [phone].    We comply with applicable federal civil rights laws and [place] laws. We do not discriminate on the basis of race, color, national origin, age, disability, sex, sexual orientation, or gender identity.            Thank you!     Thank you for choosing [facility]  for your care. Our goal is always to provide you with excellent care. Hearing back from our patients is one way we can continue to improve our services. Please take a few minutes to complete the written survey that you may receive in the mail after your visit with us. Thank you!             Your Updated Medication List - Protect others around you: Learn how to safely use,  store and throw away your medicines at www.disposemymeds.org.          This list is accurate as of: 10/19/17  3:28 PM.  Always use your most recent med list.                   Brand Name Dispense Instructions for use Diagnosis    acyclovir 200 MG capsule    ZOVIRAX    25 capsule    For 5 days  per herpes outbreak    HSV (herpes simplex virus) infection       atorvastatin 40 MG tablet    LIPITOR    90 tablet    Take 1 tablet (40 mg) by mouth daily    Hyperlipidemia LDL goal <100       cycloSPORINE 0.05 % ophthalmic emulsion    RESTASIS     Place 1 drop into both eyes 3 times daily        fluticasone 50 MCG/ACT spray    FLONASE    3 Package    Spray 1-2 sprays into both nostrils daily if needed    Allergic rhinitis       gabapentin 600 MG tablet    NEURONTIN    405 tablet    1-1.5 tabs three times a day    Neuropathy       levothyroxine 100 MCG tablet    SYNTHROID/LEVOTHROID    90 tablet    TAKE 1 TABLET(100 MCG) BY MOUTH DAILY    Hypothyroidism, unspecified type       lisinopril 20 MG tablet    PRINIVIL/ZESTRIL    90 tablet    Take 1 tablet (20 mg) by mouth daily    Essential hypertension       metFORMIN 1000 MG tablet    GLUCOPHAGE    180 tablet    Take 1 tablet (1,000 mg) by mouth 2 times daily (with meals)    Type 2 diabetes mellitus with other circulatory complications       naproxen 500 MG tablet    NAPROSYN    180 tablet    TAKE 1 TABLET(500 MG) BY MOUTH TWICE DAILY WITH MEALS AS NEEDED FOR PAIN    Sciatica, unspecified laterality       omeprazole 20 MG tablet    priLOSEC OTC    180 tablet    Take 1 tablet (20 mg) by mouth 2 times daily (before meals)    Esophageal reflux       ranitidine 150 MG tablet    ZANTAC    180 tablet    Take 1 tablet (150 mg) by mouth 2 times daily    Esophageal reflux       ROPINIROLE HYDROCHLORIDE 4 MG Tabs     90 tablet    TAKE 1 TABLET(4 MG) BY MOUTH AT BEDTIME    Restless leg       tacrolimus 0.1 % ointment    PROTOPIC    60 g    Apply to face BID PRN    Acute dermatitis        traZODone 50 MG tablet    DESYREL    180 tablet    TAKE 2 TABLETS (total 100 MG) BY MOUTH AT BEDTIME    Insomnia, unspecified type

## 2017-10-20 ENCOUNTER — ALLIED HEALTH/NURSE VISIT (OUTPATIENT)
Dept: PHARMACY | Facility: CLINIC | Age: 72
End: 2017-10-20

## 2017-10-20 DIAGNOSIS — K21.9 GASTROESOPHAGEAL REFLUX DISEASE, ESOPHAGITIS PRESENCE NOT SPECIFIED: ICD-10-CM

## 2017-10-20 DIAGNOSIS — E78.5 HYPERLIPIDEMIA LDL GOAL <100: ICD-10-CM

## 2017-10-20 DIAGNOSIS — E11.8 TYPE 2 DIABETES MELLITUS WITH COMPLICATION, WITHOUT LONG-TERM CURRENT USE OF INSULIN (H): ICD-10-CM

## 2017-10-20 DIAGNOSIS — M85.80 OSTEOPENIA, UNSPECIFIED LOCATION: ICD-10-CM

## 2017-10-20 DIAGNOSIS — L80 VITILIGO: ICD-10-CM

## 2017-10-20 DIAGNOSIS — G62.9 NEUROPATHY: ICD-10-CM

## 2017-10-20 DIAGNOSIS — G25.81 RESTLESS LEG SYNDROME: ICD-10-CM

## 2017-10-20 DIAGNOSIS — G47.00 INSOMNIA, UNSPECIFIED TYPE: ICD-10-CM

## 2017-10-20 DIAGNOSIS — Z71.85 VACCINE COUNSELING: ICD-10-CM

## 2017-10-20 DIAGNOSIS — I10 ESSENTIAL HYPERTENSION: ICD-10-CM

## 2017-10-20 DIAGNOSIS — B00.9 HSV (HERPES SIMPLEX VIRUS) INFECTION: ICD-10-CM

## 2017-10-20 DIAGNOSIS — F41.1 ANXIETY STATE: ICD-10-CM

## 2017-10-20 DIAGNOSIS — I63.9 CEREBROVASCULAR ACCIDENT (CVA), UNSPECIFIED MECHANISM (H): Primary | ICD-10-CM

## 2017-10-20 DIAGNOSIS — R52 INTERMITTENT PAIN: ICD-10-CM

## 2017-10-20 DIAGNOSIS — H04.129 TEAR FILM INSUFFICIENCY, UNSPECIFIED LATERALITY: ICD-10-CM

## 2017-10-20 DIAGNOSIS — E03.9 HYPOTHYROIDISM, UNSPECIFIED TYPE: ICD-10-CM

## 2017-10-20 PROCEDURE — 99607 MTMS BY PHARM ADDL 15 MIN: CPT | Performed by: PHARMACIST

## 2017-10-20 PROCEDURE — 99605 MTMS BY PHARM NP 15 MIN: CPT | Performed by: PHARMACIST

## 2017-10-20 RX ORDER — GABAPENTIN 600 MG/1
900 TABLET ORAL 2 TIMES DAILY
COMMUNITY
Start: 2017-10-20 | End: 2018-01-26

## 2017-10-20 NOTE — Clinical Note
Yuki Briones,  I called Nadege for an MTM appointment today. I am wondering your thoughts on increasing her levothyroxine to 112 mcg daily. Additionally, I would appreciate your input on having her resume aspirin 81 mg daily. Of note, it looks like her microalbumin was not drawn with her last labs. I advised her to have this drawn with her next labs.  Thank you, Abiola CrouchD  MTM Pharmacist  Phone: [phone]

## 2017-10-20 NOTE — PROGRESS NOTES
SUBJECTIVE/OBJECTIVE:                           Susi Aguila is a 72 year old female called for an initial visit for Medication Therapy Management.  She was referred to me from [facility] recruitment.     Chief Complaint: No questions at this time - she has been taking these for so long.    Allergies/ADRs: Reviewed in Epic  Tobacco: No tobacco use  Alcohol: 1-3 beverages / week  Caffeine: 2-3 cups/day of coffee  Activity: pt is in a wheelchair - does do arm exercises  -Stays in an extended stay hotel, will be moving November 1st      Medication Adherence: no issues reported    History of Stroke: No current therapy. Pt has been on aspirin in the past - pt said this was changed by [facility]. Pt is bothered by not having anticoagulation and is fearful of having another stroke. Pt is followed by Neurosurgery - Doug Salmeron. She does not see him unless the MRIs show changes. Pt would feel benefit from seeing neurosurgery to discuss the plan.    Hypertension: Current medications include lisinopril 20 mg daily.  Patient does not self-monitor BP.  Patient reports no current medication side effects.    Diabetes:  Pt currently taking metformin 1,000 mg BID. Pt is not experiencing side effects.  SMBG: never.     Patient is not experiencing hypoglycemia - only if she doesn't eat meals on time  Recent symptoms of high blood sugar? none  Eye exam: up to date  Foot exam: up to date  Microalbumin is not < 30 mg/g. Pt is taking an ACEi/ARB.  Aspirin: Not taking - unsure why this was stopped  Diet/Exercise: limited as patient is wheelchair bound  Patient reports she had lab-work done but did not hear about the results yet.    Hyperlipidemia: Current therapy includes atorvastatin 40 mg once daily.  Pt reports no significant myalgias or other side effects.     GERD: Current medications include: Prilosec (omeprazole) 20 mg BID and Zantac (ranitidine) 150 mg twice daily. Pt c/o heartburn.  Patient feels that current regimen is  somewhat effective. Patient does not eat a lot of spicy food or tomatoes. She does have orange juice every morning.   Estimated Creatinine Clearance: 58.1 mL/min (based on Cr of 0.85).    Hypothyroidism: Patient is taking levothyroxine 100 mcg daily in the morning. Patient is having the following symptoms: hypothyroidism -  cold intolerance (since coming home from hospital - stroke) and some constipation.    Herpes: Current therapy includes acyclovir 200 mg daily for five days - as needed for outbreak. She has not needed this in a long time.    Neuropathy/Pain: Current therapy includes gabapentin 900 mg BID (written for 900 mg TID) and naproxen 500 mg BID PRN (every 2nd or 3rd day). She may also use icy hot/lidocaine for her psiatica pain. Patient always feels somewhat dizzy. She says that the middle dose is too hard to do, and that she does not want to take medications TID. She is not interested in making changes today. Patient said she would rather take an M&M than tylenol, it usually doesn't work. She will typically take 1000 mg of tylenol at a time.  Estimated Creatinine Clearance: 58.1 mL/min (based on Cr of 0.85).    Osteopenia: No current therapy. Was taking caltrate 600 + D daily (but this was too expensive). Patient reports she has asked to have her bones scanned since her last DEXA, however, she was told her provider wasn't ready yet.  Pt is getting the following sources of dietary calcium: not a whole lot  Last vitamin D level: 31 2014  DEXA History: 2011 Osteopenia  Risk factors: post-menopausal, chronic PPI use, recent fracture    Insomnia: Current therapy includes trazodone 100 mg nightly. She does not feel a hangover effect from this. She reports having difficulty sleeping     Anxiety: No current therapy. Patient said she used to enjoy reading, however, her eye got nicked during surgery. Needle point.     Restless Legs: Current therapy includes ropinirole 4 mg QHS. She said this is going pretty  well.    Dry Eye: Current therapy includes Systane (out of it). She was previously on Restasis. She reports having some material in her eyes like dust or verona, from them being to dry. Restasis was too expensive,and she hasn't followed up with the eye doctor since stopping the medication.     Vitiligo:  No current therapy. Did not take due to potential side effects. She typically uses self-tanning lotion to darken her skin - this works well.    Immunizations: Pt received flu vaccine this year. She received Prevnar in 4/2016 and pneumovax 2009    Current labs include:  BP Readings from Last 3 Encounters:   08/10/17 121/78   07/27/17 128/84   07/05/17 (!) 187/95     Lab Results   Component Value Date    A1C 6.8 09/28/2017   .  Lab Results   Component Value Date    CHOL 224 09/28/2017     Lab Results   Component Value Date    TRIG 201 09/28/2017     Lab Results   Component Value Date    HDL 41 09/28/2017     Lab Results   Component Value Date     09/28/2017       Liver Function Studies -   Recent Labs   Lab Test  09/28/17   1052   PROTTOTAL  7.6   ALBUMIN  3.3*   BILITOTAL  0.4   ALKPHOS  90   AST  9   ALT  14       Lab Results   Component Value Date    UCRR 162 09/06/2016    MICROL 156 09/06/2016    UMALCR 96.30 (H) 09/06/2016       Last Basic Metabolic Panel:  Lab Results   Component Value Date     09/28/2017      Lab Results   Component Value Date    POTASSIUM 4.0 09/28/2017     Lab Results   Component Value Date    CHLORIDE 110 09/28/2017     Lab Results   Component Value Date    BUN 15 09/28/2017     Lab Results   Component Value Date    CR 0.85 09/28/2017     GFR Estimate   Date Value Ref Range Status   09/28/2017 66 >60 mL/min/1.7m2 Final     Comment:     Non  GFR Calc   08/10/2017 29 (L) >60 mL/min/1.7m2 Final     Comment:     Non  GFR Calc   07/05/2017 68 >60 mL/min/1.7m2 Final     Comment:     Non  GFR Calc     GFR Estimate If Black   Date Value  Ref Range Status   09/28/2017 79 >60 mL/min/1.7m2 Final     Comment:      GFR Calc   08/10/2017 35 (L) >60 mL/min/1.7m2 Final     Comment:      GFR Calc   07/05/2017 83 >60 mL/min/1.7m2 Final     Comment:      GFR Calc     TSH   Date Value Ref Range Status   09/28/2017 7.04 (H) 0.40 - 4.00 mU/L Final   ]    Most Recent Immunizations   Administered Date(s) Administered     Influenza (H1N1) 11/30/2009     Influenza (High Dose) 3 valent vaccine 09/28/2017     Influenza (IIV3) 09/26/2012     Pneumococcal (PCV 13) 04/25/2016     Pneumococcal 23 valent 05/20/2009     TDAP Vaccine (Adacel) 04/09/2012     Zoster vaccine, live 09/26/2012       ASSESSMENT:                             Current medications were reviewed today.     Medication Adherence: issues identified and discussed below.    History of Stroke:  Needs improvement. Pt h/o stroke, would benefit from aspirin therapy for prevention of stroke.  Pt has high risk of bleeding. HAS-BLEED score 5 ( HTN, Stroke, Major Bleed, Age > 65, Alcohol use). Pt has an increased risk of clotting: Diabetes, overweight, sedentary, and microalbuminuria. Although it is a gray area, patient may benefit from addition of low-dose aspirin therapy given her number of risk factors for ischemic stroke. Would appreciate input from Dr. Briones.     Hypertension: Stable. Patient is meeting BP goal of < 140/90mmHg.     Diabetes: Stable. Patient is meeting A1c goal of < 7%.    Hyperlipidemia: Stable. Pt is on high intensity statin which is indicated based on 2013 ACC/AHA guidelines for lipid management. Her compliance is questionable based on her fill history in [facility], although she is adamant that she takes her medications daily.     GERD: Stable.  Current treatment is effective. Will continue to monitor for long term side effects.    Hypothyroidism: Needs Improvement. Pt reports experiencing symptoms consistent with hypothyroidism (cold  intolerance, hyperlipidemia). Last TSH is above normal range (7.04). However, last T4 was within normal limits (0.77) but on the low end of normal. Patient may benefit from an increase in her dose of levothyroxine.    Herpes: Stable. No active infection.    Neuropathy/Pain: Needs improvement. Pt not experiencing pain, but having significant adverse effects related to her dose of gabapentin. Pt may benefit from a decrease in her gabapentin dose to see if this helps with her dizziness, however, she is not interested today. It is not clear she is taking the gabapentin consistently, based on a review of her fill history.    Osteopenia: Needs improvement. Pt may benefit from receiving an updated DEXA scan (last received in 2011). Pat would benefit from ensuring she is getting adequate Calcium and Vitamin D from normal diet +/- supplements.    Insomnia: Needs Improvement. Pt may benefit from relaxation techniques, stress reduction, further education on sleep hygiene.    Anxiety:  Unimproved. Pt would benefit from exploring new ways to relax, such as other needlework.     Restless Legs: Stable.    Dry Eye: Unchanged. Pt would benefit from following up with her ophthalmologist.    Vitiligo: Stable. Pt currently using non-pharmacological interventions.    Immunizations:   Needs improvement. Pt would benefit from receiving a Pneumovax vaccine.    PLAN:                          1. Pt to discuss re-starting aspirin with Dr. Briones, or consider follow-up with neurosurgery  2. Pt to have microalbumin lab drawn (future order previously placed by Dr. Briones)  3. Pt to take tylenol as opposed to naproxen  4. Pt to ensure adequate calcium + D, preference to calcium citrate if supplement  5. Pt to get Pneumovax-23  6. Pt to explore activities to help with stress    Considerations for Dr. Briones  1. Increase to levothyroxine 112 mcg daily  Update: Dr. Briones suggested patient have thyroid re-drawn do to mixed lab results. Pt has not yet  completed at remains on same dose.  2. Re-start aspirin 81 mg daily    I spent 75 minutes with this patient today. I offer these suggestions for consideration by the PCP. A copy of the visit note was provided to the patient's primary care provider.    Will follow up in 3 months.    The patient declined a summary of these recommendations as an after visit summary. Patient does not have a current address. She is between homes.    Demar Valencia PharmD IV Student    and    Abiola Sorto PharmD   MTM Pharmacist

## 2017-10-20 NOTE — MR AVS SNAPSHOT
After Visit Summary   10/20/2017    Susi Aguila    MRN: [id number]           Patient Information     Date Of Birth          1945        Visit Information        Provider Department      10/20/2017 10:00 AM Abiola Sorto [facility] Medication Therapy Management        Today's Diagnoses     Cerebrovascular accident (CVA), unspecified mechanism (H)    -  1    Type 2 diabetes mellitus with complication, without long-term current use of insulin (H)        Hypothyroidism, unspecified type        Restless leg syndrome        Insomnia, unspecified type        Essential hypertension        Gastroesophageal reflux disease, esophagitis presence not specified        Vitiligo        Vaccine counseling        Tear film insufficiency, unspecified laterality        Neuropathy        Anxiety state        Osteopenia, unspecified location        Intermittent pain        HSV (herpes simplex virus) infection        Hyperlipidemia LDL goal <100          Care Instructions    Recommendations from today's MTM visit:                                                    MTM (medication therapy management) is a service provided by a clinical pharmacist designed to help you get the most of out of your medicines.   Today we reviewed what your medicines are for, how to know if they are working, that your medicines are safe and how to make your medicine regimen as easy as possible.     1. We will reach out to Dr. Briones regarding your aspirin and levothyroxine therapy.   2. Next time you get labs - have microalbumin lab drawn (future order previously placed by Dr. Briones)  3. Try taking Tylenol more than once daily as opposed to using naproxen, when needed. You can take 650 mg   4. Try to get more calcium/vitamin D through diet and supplements. See below for goals.  5. You are due for Pneumovax-23 since you have not received one since turning 66.  6. Try exploring different ways to relax to help with your stress and  sleep.      I apply to discuss your current regimen of Levothyroxine with Dr. Briones to makes sure you are on the correct dose    Next MTM visit: 3 months    To schedule another MTM appointment, please call the clinic directly or you may call the MTM scheduling line at [phone] or toll-free at [phone].     My Clinical Pharmacist's contact information:                                                      It was a pleasure seeing you today!  Please feel free to contact me with any questions or concerns you have.      Blaine Valencia, Pharmacy Student    Abiola Sorto PharmD   MTM Pharmacist   Phone: [phone]    You may receive a survey about the MTM services you received.  I would appreciate your feedback to help me serve you better in the future. Please fill it out and return it when you can. Your comments will be anonymous.      Bone Health Goals:  Getting the right nutrients: Getting adequate calcium and Vitamin-D reduces bone loss that occurs with age and can reduce the risk of hip and non-spine fractures in older adults. Protein and other nutrients, such as minerals phosphorus, sodium and magnesium, also help keep bones strong.    Calcium intake goals: Your daily calcium requirement is 1200mg/day. You are currently getting 0 mg from supplements and diet. Your body can only absorb 500-600mg of calcium at a time, so be sure to separate your calcium supplements by at least 4 hours.     Calcium carbonate vs citrate: Calcium carbonate needs lots of stomach acid to be absorbed . Because you are taking medication to lower stomach acid, calcium citrate would be a better option as is does not need stomach acid to be absorbed. Please change your calcium supplement to calcium citrate.    Vitamin D3: Vitamin-D3 is important for your overall health, including your bone health. Vitamin-D3 helps increase the absorption of calcium. We recommend that you take 1000 units of Vitamin-D3 daily. This can be taken in  "a single dose if that is more convenient for you.          Follow-ups after your visit        Who to contact     If you have questions or need follow up information about today's clinic visit or your schedule please contact [facility] MEDICATION THERAPY MANAGEMENT directly at [phone].  Normal or non-critical lab and imaging results will be communicated to you by [employer], letter or phone within 4 business days after the clinic has received the results. If you do not hear from us within 7 days, please contact the clinic through [employer] or phone. If you have a critical or abnormal lab result, we will notify you by phone as soon as possible.  Submit refill requests through [employer] or call your pharmacy and they will forward the refill request to us. Please allow 3 business days for your refill to be completed.          Additional Information About Your Visit        [employer] Information     LightPole lets you send messages to your doctor, view your test results, renew your prescriptions, schedule appointments and more. To sign up, go to [email or web address] . Click on \"Log in\" on the left side of the screen, which will take you to the Welcome page. Then click on \"Sign up Now\" on the right side of the page.     You will be asked to enter the access code listed below, as well as some personal information. Please follow the directions to create your username and password.     Your access code is: [email or web address]  Expires: 2017  6:30 AM     Your access code will  in 90 days. If you need help or a new code, please call your [facility] clinic or [phone].        Care EveryWhere ID     This is your Care EveryWhere ID. This could be used by other organizations to access your [facility] medical records  [id number]         Blood Pressure from Last 3 Encounters:   08/10/17 121/78   17 128/84   17 (!) 187/95    Weight from Last 3 Encounters:   17 153 lb (69.4 kg)   08/10/17 153 lb 8 oz (69.6 kg) "   07/27/17 153 lb (69.4 kg)              Today, you had the following     No orders found for display         Today's Medication Changes          These changes are accurate as of: 10/20/17  2:24 PM.  If you have any questions, ask your nurse or doctor.               These medicines have changed or have updated prescriptions.        Dose/Directions    gabapentin 600 MG tablet   Commonly known as:  NEURONTIN   This may have changed:    - how much to take  - how to take this  - when to take this  - additional instructions   Used for:  Neuropathy        Dose:  900 mg   Take 1.5 tablets (900 mg) by mouth 2 times daily   Refills:  0                Primary Care Provider Office Phone # Fax #    Logan Briones [phone]       [address] [facility] [address]        Equal Access to Services     [place] : Suma Mehta, waluana nolandqluzma, qaybta kaalmaannette luong, lala miller. So [facility] [phone].    ATENCIÓN: Si habla español, tiene a cabezas disposición servicios gratuitos de asistencia lingüística. [facility] [phone].    We comply with applicable federal civil rights laws and [place] laws. We do not discriminate on the basis of race, color, national origin, age, disability, sex, sexual orientation, or gender identity.            Thank you!     Thank you for choosing [facility] MEDICATION THERAPY MANAGEMENT  for your care. Our goal is always to provide you with excellent care. Hearing back from our patients is one way we can continue to improve our services. Please take a few minutes to complete the written survey that you may receive in the mail after your visit with us. Thank you!             Your Updated Medication List - Protect others around you: Learn how to safely use, store and throw away your medicines at www.disposemymeds.org.          This list is accurate as of: 10/20/17  2:24 PM.  Always use your most recent med list.                   Brand  Name Dispense Instructions for use Diagnosis    acyclovir 200 MG capsule    ZOVIRAX    25 capsule    For 5 days  per herpes outbreak    HSV (herpes simplex virus) infection       atorvastatin 40 MG tablet    LIPITOR    90 tablet    Take 1 tablet (40 mg) by mouth daily    Hyperlipidemia LDL goal <100       gabapentin 600 MG tablet    NEURONTIN     Take 1.5 tablets (900 mg) by mouth 2 times daily    Neuropathy       levothyroxine 100 MCG tablet    SYNTHROID/LEVOTHROID    90 tablet    TAKE 1 TABLET(100 MCG) BY MOUTH DAILY    Hypothyroidism, unspecified type       lisinopril 20 MG tablet    PRINIVIL/ZESTRIL    90 tablet    Take 1 tablet (20 mg) by mouth daily    Essential hypertension       metFORMIN 1000 MG tablet    GLUCOPHAGE    180 tablet    Take 1 tablet (1,000 mg) by mouth 2 times daily (with meals)    Type 2 diabetes mellitus with other circulatory complications       naproxen 500 MG tablet    NAPROSYN    180 tablet    TAKE 1 TABLET(500 MG) BY MOUTH TWICE DAILY WITH MEALS AS NEEDED FOR PAIN    Sciatica, unspecified laterality       omeprazole 20 MG tablet    priLOSEC OTC    180 tablet    Take 1 tablet (20 mg) by mouth 2 times daily (before meals)    Esophageal reflux       ranitidine 150 MG tablet    ZANTAC    180 tablet    Take 1 tablet (150 mg) by mouth 2 times daily    Esophageal reflux       ROPINIROLE HYDROCHLORIDE 4 MG Tabs     90 tablet    TAKE 1 TABLET(4 MG) BY MOUTH AT BEDTIME    Restless leg       traZODone 50 MG tablet    DESYREL    180 tablet    TAKE 2 TABLETS (total 100 MG) BY MOUTH AT BEDTIME    Insomnia, unspecified type

## 2017-10-20 NOTE — PATIENT INSTRUCTIONS
Recommendations from today's MTM visit:                                                    MTM (medication therapy management) is a service provided by a clinical pharmacist designed to help you get the most of out of your medicines.   Today we reviewed what your medicines are for, how to know if they are working, that your medicines are safe and how to make your medicine regimen as easy as possible.     1. We will reach out to Dr. Briones regarding your aspirin and levothyroxine therapy.   2. Next time you get labs - have microalbumin lab drawn (future order previously placed by Dr. Briones)  3. Try taking Tylenol more than once daily as opposed to using naproxen, when needed. You can take 650 mg   4. Try to get more calcium/vitamin D through diet and supplements. See below for goals.  5. You are due for Pneumovax-23 since you have not received one since turning 66.  6. Try exploring different ways to relax to help with your stress and sleep.      I apply to discuss your current regimen of Levothyroxine with Dr. Briones to makes sure you are on the correct dose    Next MTM visit: 3 months    To schedule another MTM appointment, please call the clinic directly or you may call the MTM scheduling line at [phone] or toll-free at [phone].     My Clinical Pharmacist's contact information:                                                      It was a pleasure seeing you today!  Please feel free to contact me with any questions or concerns you have.      Blaine Valencia, Pharmacy Student    Abiola Sorto PharmD   MTM Pharmacist   Phone: [phone]    You may receive a survey about the MTM services you received.  I would appreciate your feedback to help me serve you better in the future. Please fill it out and return it when you can. Your comments will be anonymous.      Bone Health Goals:  Getting the right nutrients: Getting adequate calcium and Vitamin-D reduces bone loss that occurs with age and can reduce  the risk of hip and non-spine fractures in older adults. Protein and other nutrients, such as minerals phosphorus, sodium and magnesium, also help keep bones strong.    Calcium intake goals: Your daily calcium requirement is 1200mg/day. You are currently getting 0 mg from supplements and diet. Your body can only absorb 500-600mg of calcium at a time, so be sure to separate your calcium supplements by at least 4 hours.     Calcium carbonate vs citrate: Calcium carbonate needs lots of stomach acid to be absorbed . Because you are taking medication to lower stomach acid, calcium citrate would be a better option as is does not need stomach acid to be absorbed. Please change your calcium supplement to calcium citrate.    Vitamin D3: Vitamin-D3 is important for your overall health, including your bone health. Vitamin-D3 helps increase the absorption of calcium. We recommend that you take 1000 units of Vitamin-D3 daily. This can be taken in a single dose if that is more convenient for you.

## 2017-11-05 DIAGNOSIS — E03.9 HYPOTHYROIDISM, UNSPECIFIED TYPE: ICD-10-CM

## 2017-11-05 NOTE — LETTER
[facility]  [address] [place] [address]  [phone]      November 16, 2017          Susi Aguila  [facility] [facility] [address] [place] [address]    Dear Susi,    Your current medication request for Levothyroxine will be approved for one 30-day refill but you are due for an appointment with your provider and need to have labs drawn  before any additional refills can be approved.    Taking care of your health is important to us and we look forward to seeing you in the near future.  Please call us at [phone] or [phone] (or use [employer]) to schedule an appointment.     Please disregard this notice if you have already made an appointment.    Sincerely,    MAHSA Parks MD  [facility]

## 2017-11-08 DIAGNOSIS — E03.9 HYPOTHYROIDISM, UNSPECIFIED TYPE: ICD-10-CM

## 2017-11-08 RX ORDER — LEVOTHYROXINE SODIUM 100 UG/1
TABLET ORAL
Qty: 30 TABLET | Refills: 0 | Status: SHIPPED | OUTPATIENT
Start: 2017-11-08 | End: 2017-12-27

## 2017-11-09 RX ORDER — LEVOTHYROXINE SODIUM 100 UG/1
TABLET ORAL
Qty: 30 TABLET | Refills: 0 | OUTPATIENT
Start: 2017-11-09

## 2017-11-09 NOTE — TELEPHONE ENCOUNTER
Call pt.  Has had lab results both showing the thyroid function being a little bit too high and also thyroid function being too low with same levothyroxine 100  g daily dose. Wonder if most recent thyroid labs represent some noncompliance with taking medication. Levothyroxine refilled for the next 30 days with the same 100  g dose. Patient to have a nonfasting thyroid lab repeated in the next couple weeks. Please assist patient with scheduling the lab appointment. Will make further decisions regarding refills and dosing once that lab result is available to review.

## 2017-11-15 DIAGNOSIS — I10 ESSENTIAL HYPERTENSION: ICD-10-CM

## 2017-11-15 RX ORDER — LISINOPRIL 20 MG/1
TABLET ORAL
Qty: 90 TABLET | Refills: 1 | Status: SHIPPED | OUTPATIENT
Start: 2017-11-15 | End: 2018-01-26

## 2017-12-27 DIAGNOSIS — E03.9 HYPOTHYROIDISM, UNSPECIFIED TYPE: ICD-10-CM

## 2018-01-12 RX ORDER — LEVOTHYROXINE SODIUM 100 UG/1
TABLET ORAL
Qty: 30 TABLET | Refills: 0 | Status: SHIPPED | OUTPATIENT
Start: 2018-01-12 | End: 2018-01-26

## 2018-01-12 NOTE — TELEPHONE ENCOUNTER
Advised pt she was suppose to come in within 30 days after her last fill 11/08/2017 to get non fasting labs done and those were not completed. Patient will call back in to get that scheduled. She is asking if she could get a few pills until she is able to get in for her lab appt because she is out of medication.

## 2018-01-13 DIAGNOSIS — E03.9 HYPOTHYROIDISM, UNSPECIFIED TYPE: ICD-10-CM

## 2018-01-13 NOTE — TELEPHONE ENCOUNTER
Rx approved for 30 days. See notes attached to refill since pt's listed tele number is out of service

## 2018-01-14 NOTE — TELEPHONE ENCOUNTER
"Requested Prescriptions   Pending Prescriptions Disp Refills     levothyroxine (SYNTHROID/LEVOTHROID) 100 MCG tablet [Pharmacy Med Name: LEVOTHYROXINE 0.100MG (100MCG) TAB]  Last Written Prescription Date:  1/12/18  Last Fill Quantity: 30 TABLET,  # refills: 0   Last Office Visit with [facility], [facility] or [facility] prescribing provider:  7/27/17   Future Office Visit:      90 tablet 0     Sig: TAKE 1 TABLET BY MOUTH EVERY DAY. NEED FASTING LAB TEST IN THE NEXT 30 DAYS    Thyroid Protocol Failed    1/13/2018  9:24 AM       Failed - Normal TSH on file in past 12 months    Recent Labs   Lab Test  09/28/17   1052   TSH  7.04*             Passed - Patient is 12 years or older       Passed - Recent or future visit with authorizing provider's specialty    Patient had office visit in the last year or has a visit in the next 30 days with authorizing provider.  See \"Patient Info\" tab in inbasket, or \"Choose Columns\" in Meds & Orders section of the refill encounter.              Passed - No active pregnancy on record    If patient is pregnant or has had a positive pregnancy test, please check TSH.         Passed - No positive pregnancy test in past 12 months    If patient is pregnant or has had a positive pregnancy test, please check TSH.            "

## 2018-01-16 ENCOUNTER — MEDICAL CORRESPONDENCE (OUTPATIENT)
Dept: HEALTH INFORMATION MANAGEMENT | Facility: CLINIC | Age: 73
End: 2018-01-16

## 2018-01-16 RX ORDER — LEVOTHYROXINE SODIUM 100 UG/1
TABLET ORAL
Qty: 90 TABLET | Refills: 0 | OUTPATIENT
Start: 2018-01-16

## 2018-01-24 ENCOUNTER — TRANSFERRED RECORDS (OUTPATIENT)
Dept: HEALTH INFORMATION MANAGEMENT | Facility: CLINIC | Age: 73
End: 2018-01-24

## 2018-01-26 ENCOUNTER — OFFICE VISIT (OUTPATIENT)
Dept: INTERNAL MEDICINE | Facility: CLINIC | Age: 73
End: 2018-01-26
Payer: MEDICARE

## 2018-01-26 VITALS
HEART RATE: 68 BPM | OXYGEN SATURATION: 92 % | RESPIRATION RATE: 16 BRPM | WEIGHT: 147 LBS | SYSTOLIC BLOOD PRESSURE: 130 MMHG | TEMPERATURE: 98.5 F | BODY MASS INDEX: 25.23 KG/M2 | DIASTOLIC BLOOD PRESSURE: 78 MMHG

## 2018-01-26 DIAGNOSIS — Z23 NEED FOR PNEUMOCOCCAL VACCINATION: ICD-10-CM

## 2018-01-26 DIAGNOSIS — K21.9 GASTROESOPHAGEAL REFLUX DISEASE, ESOPHAGITIS PRESENCE NOT SPECIFIED: ICD-10-CM

## 2018-01-26 DIAGNOSIS — E03.9 HYPOTHYROIDISM, UNSPECIFIED TYPE: ICD-10-CM

## 2018-01-26 DIAGNOSIS — G62.9 NEUROPATHY: ICD-10-CM

## 2018-01-26 DIAGNOSIS — G25.81 RESTLESS LEG: ICD-10-CM

## 2018-01-26 DIAGNOSIS — E78.5 HYPERLIPIDEMIA LDL GOAL <100: ICD-10-CM

## 2018-01-26 DIAGNOSIS — G47.00 INSOMNIA, UNSPECIFIED TYPE: ICD-10-CM

## 2018-01-26 DIAGNOSIS — I10 ESSENTIAL HYPERTENSION: ICD-10-CM

## 2018-01-26 DIAGNOSIS — R22.0 MASS OF HEAD: ICD-10-CM

## 2018-01-26 DIAGNOSIS — E11.49 TYPE 2 DIABETES MELLITUS WITH OTHER NEUROLOGIC COMPLICATION, WITHOUT LONG-TERM CURRENT USE OF INSULIN (H): Primary | ICD-10-CM

## 2018-01-26 DIAGNOSIS — F32.1 MODERATE MAJOR DEPRESSION (H): ICD-10-CM

## 2018-01-26 PROCEDURE — G0009 ADMIN PNEUMOCOCCAL VACCINE: HCPCS | Performed by: INTERNAL MEDICINE

## 2018-01-26 PROCEDURE — 99214 OFFICE O/P EST MOD 30 MIN: CPT | Mod: 25 | Performed by: INTERNAL MEDICINE

## 2018-01-26 PROCEDURE — 90732 PPSV23 VACC 2 YRS+ SUBQ/IM: CPT | Performed by: INTERNAL MEDICINE

## 2018-01-26 RX ORDER — ROPINIROLE 4 MG/1
TABLET, FILM COATED ORAL
Qty: 90 TABLET | Refills: 3 | Status: SHIPPED | OUTPATIENT
Start: 2018-01-26 | End: 2018-09-05

## 2018-01-26 RX ORDER — TRAZODONE HYDROCHLORIDE 50 MG/1
TABLET, FILM COATED ORAL
Qty: 180 TABLET | Refills: 3 | Status: ON HOLD | OUTPATIENT
Start: 2018-01-26 | End: 2018-02-08

## 2018-01-26 RX ORDER — ATORVASTATIN CALCIUM 40 MG/1
40 TABLET, FILM COATED ORAL DAILY
Qty: 90 TABLET | Refills: 0 | Status: SHIPPED | OUTPATIENT
Start: 2018-01-26 | End: 2018-03-14

## 2018-01-26 RX ORDER — LISINOPRIL 20 MG/1
TABLET ORAL
Qty: 90 TABLET | Refills: 3 | Status: SHIPPED | OUTPATIENT
Start: 2018-01-26 | End: 2018-08-09

## 2018-01-26 RX ORDER — LEVOTHYROXINE SODIUM 100 UG/1
TABLET ORAL
Qty: 90 TABLET | Refills: 0 | Status: SHIPPED | OUTPATIENT
Start: 2018-01-26 | End: 2018-03-07

## 2018-01-26 RX ORDER — OMEPRAZOLE 20 MG/1
20 TABLET, DELAYED RELEASE ORAL
Qty: 180 TABLET | Refills: 3 | Status: SHIPPED | OUTPATIENT
Start: 2018-01-26 | End: 2018-12-12

## 2018-01-26 RX ORDER — GABAPENTIN 600 MG/1
900 TABLET ORAL 2 TIMES DAILY
Qty: 270 TABLET | Refills: 3 | Status: ON HOLD | OUTPATIENT
Start: 2018-01-26 | End: 2018-02-08

## 2018-01-26 RX ORDER — TACROLIMUS 1 MG/G
OINTMENT TOPICAL 2 TIMES DAILY
COMMUNITY
Start: 2018-01-26

## 2018-01-26 NOTE — PATIENT INSTRUCTIONS
Take meds as per medication list. Fill pill boxes weekly   Meds refilled  Fasting lab in late February. For fasting labs, please refrain from eating for 8 hours or more.  Be sure to  drink water and take your  medications the day of the test.   Pneumococal vaccine  Follow-up with neurosurgery as scheduled with CT scan brain

## 2018-01-26 NOTE — NURSING NOTE
Screening Questionnaire for Adult Immunization     Are you sick today?   No   Do you have allergies to medications, food, a vaccine component or latex?   No   Have you ever had a serious reaction after receiving a vaccination?   No   Do you have a long-term health problem with heart disease, lung disease, asthma, kidney disease, metabolic disease (e.g. diabetes), anemia, or other blood disorder?   Yes   Do you have cancer, leukemia, HIV/AIDS, or any other immune system problem?   No   In the past 3 months, have you taken medications that affect  your immune system, such as prednisone, other steroids, or anticancer drugs; drugs for the treatment of rheumatoid arthritis, Crohn s disease, or psoriasis; or have you had radiation treatments?   No   Have you had a seizure, or a brain or other nervous system problem?   No   During the past year, have you received a transfusion of blood or blood     products, or been given immune (gamma) globulin or antiviral drug?   No   For women: Are you pregnant or is there a chance you could become        pregnant during the next month?   No   Have you received any vaccinations in the past 4 weeks?   No     Immunization questionnaire was positive for at least one answer.  Notified Dr. Briones.        Per orders of Dr. Briones, injection of [id number] given by Lisa Ward. Patient instructed to remain in clinic for 15 minutes afterwards, and to report any adverse reaction to me immediately.       Screening performed by Lisa Ward on 1/26/2018 at 1:55 PM.

## 2018-01-26 NOTE — PROGRESS NOTES
SUBJECTIVE:   Susi Aguila is a 72 year old female who presents to clinic today for the following health issues:    Chief Complaint   Patient presents with     Thyroid Problem     Diabetes     Hyperlipidemia     Hypertension         Diabetes Follow-up      Patient is checking blood sugars: not at all    Diabetic concerns: None     Symptoms of hypoglycemia (low blood sugar): none     Paresthesias (numbness or burning in feet) or sores: Yes burning at pm     Date of last diabetic eye exam: 03/09/2017    Hyperlipidemia Follow-Up      Rate your low fat/cholesterol diet?: not monitoring fat    Taking statin?  Yes, no muscle aches from statin    Other lipid medications/supplements?:  none    Hypertension Follow-up      Outpatient blood pressures are not being checked.    Low Salt Diet: no added salt    BP Readings from Last 2 Encounters:   08/10/17 121/78   07/27/17 128/84     Hemoglobin A1C (%)   Date Value   09/28/2017 6.8 (H)   03/09/2017 6.3 (H)     LDL Cholesterol Calculated (mg/dL)   Date Value   09/28/2017 143 (H)   09/06/2016 45     Hypothyroidism Follow-up      Since last visit, patient describes the following symptoms: Weight stable, no hair loss, no skin changes, no constipation, no loose stools      Amount of exercise or physical activity: None    Problems taking medications regularly: No    Medication side effects: none    Diet: regular (no restrictions), low salt and low fat/cholesterol      Pt's past medical history, family history, habits, medications and allergies were reviewed with the patient today.  See snap shot for  HCM status. Most recent lab results reviewed with pt. Problem list and histories reviewed & adjusted, as indicated.  Additional history as below:    Spoke with patient's pharmacy.  Last filled  atorvastatin in May 2017.  Last filled metformin in October 2017.  Patient has been missing doses of levothyroxine intermittently also and this was last filled now January 15, 2018.   "Lisinopril last filled in September 2017.  Patient has a growth on her forehead.  Has an appointment with neurosurgery at [facility] next Wednesday and will be having a CAT scan of her brain next Monday per patient. No records to review.  Minimal headache.  No changes in usual visual status.  Denies chest pain, shortness of breath, abdominal pain.  Neurologic deficits unchanged from previous.  Patient has not been checking blood sugars recently.  HAs some depression sx. PHQ=9. No suicidal ideation. Declines medication treatment at this time. States some stress with recent move and also with upcomingneusurgery work-up for lump on forehead and wondering if tumor vs other.     Lab Results   Component Value Date     09/28/2017     Lab Results   Component Value Date    A1C 6.8 09/28/2017     Lab Results   Component Value Date    CHOL 224 09/28/2017     Lab Results   Component Value Date     09/28/2017     Lab Results   Component Value Date    HDL 41 09/28/2017     Lab Results   Component Value Date    TRIG 201 09/28/2017     Lab Results   Component Value Date    CR 0.85 09/28/2017     Lab Results   Component Value Date    ALT 14 09/28/2017     Lab Results   Component Value Date    AST 9 09/28/2017     Lab Results   Component Value Date    MICROL 156 09/06/2016     Lab Results   Component Value Date    TSH 7.04 09/28/2017          Additional ROS:   Constitutional, HEENT, Cardiovascular, Pulmonary, GI and , Neuro, MSK and Psych review of systems/symptoms are otherwise negative or unchanged from previous, except as noted above.      OBJECTIVE:  /78  Pulse 68  Temp 98.5  F (36.9  C) (Oral)  Resp 16  Wt 147 lb (66.7 kg)  SpO2 92%  BMI 25.23 kg/m2   Estimated body mass index is 25.23 kg/(m^2) as calculated from the following:    Height as of 9/28/17: 5' 4\" (1.626 m).    Weight as of this encounter: 147 lb (66.7 kg).  Eye: PERRL, EOMI  HENT: ear canals and TM's normal and nose and mouth without " ulcers or lesions . Approx 2cm  Lump palpable left forhead  Neck: no adenopathy. Thyroid normal to palpation. No bruits  Pulm: Lungs clear to auscultation   CV: Regular rates and rhythm  GI: Soft, nontender, Normal active bowel sounds, No hepatosplenomegaly or masses palpable  Ext: Peripheral pulses intact. No edema. Mild calluses on bottom heels bilaterally  Neuro: Sitting in WC. Gait not assessed. Normal strength  RUE and RLE. LLE/LUE hemiparesis. 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 Sensation reduced to LTS LLE distally    Assessment/Plan: (See plan discussion below for further details)  1. Type 2 diabetes mellitus with other neurologic complication, without long-term current use of insulin (H)  controlled as of last September Spotty use Metformin since then. Pt to restart med. Future labs as per plan discussion below. Light pumus stone and moisturizer to feet daily as needed. Diabetic shoes  - metFORMIN (GLUCOPHAGE) 1000 MG tablet; Take 1 tablet (1,000 mg) by mouth 2 times daily (with meals)  Dispense: 180 tablet; Refill: 0  - Comprehensive metabolic panel; Future  - Lipid panel reflex to direct LDL Fasting; Future  - Hemoglobin A1c; Future  - TSH with free T4 reflex; Future  - order for DME; Equipment being ordered: Diabetic shoes  Dispense: 1 Device; Refill: 1    2. Moderate major depression (H)  Pt declines medication treatment at this time. MD encouraged to consider and inform MD in future if willing to start or see therapist. Will also see how responds to more consistent use of thyroid medication    3. Hypothyroidism, unspecified type   TSH elevated but had been missing med doses. Stressed compliance. Future labs as below. Med refilled  - levothyroxine (SYNTHROID/LEVOTHROID) 100 MCG tablet; 1 tab daily.  Dispense: 90 tablet; Refill: 0  - TSH with free T4 reflex; Future    4. Hemiparesis of left nondominant side due to cerebral infarction (H)  Baseline and chronic with previous therapy done. Has  PCA to assist with care.  No need for further PT/OT at this time    5. Essential hypertension  Continue medication  - lisinopril (PRINIVIL/ZESTRIL) 20 MG tablet; TAKE 1 TABLET(20 MG) BY MOUTH DAILY  Dispense: 90 tablet; Refill: 3    6. Hyperlipidemia LDL goal <100  Had been missing med doses. Stressed compliance. Future labs as below. Med refilled  - atorvastatin (LIPITOR) 40 MG tablet; Take 1 tablet (40 mg) by mouth daily  Dispense: 90 tablet; Refill: 0    7. Restless leg  controlled  - ROPINIROLE HYDROCHLORIDE 4 MG TABS; TAKE 1 TABLET(4 MG) BY MOUTH AT BEDTIME  Dispense: 90 tablet; Refill: 3    8. Neuropathy  controlled  - gabapentin (NEURONTIN) 600 MG tablet; Take 1.5 tablets (900 mg) by mouth 2 times daily  Dispense: 270 tablet; Refill: 3  - order for DME; Equipment being ordered: Diabetic shoes  Dispense: 1 Device; Refill: 1    9. Insomnia, unspecified type  controlled  - traZODone (DESYREL) 50 MG tablet; TAKE 2 TABLETS (total 100 MG) BY MOUTH AT BEDTIME  Dispense: 180 tablet; Refill: 3    10. Gastroesophageal reflux disease, esophagitis presence not specified  Sx return with using just PPI. Hx stricture so Needs both meds. NOodysphagia now   - omeprazole (PRILOSEC OTC) 20 MG tablet; Take 1 tablet (20 mg) by mouth 2 times daily (before meals)  Dispense: 180 tablet; Refill: 3  - ranitidine (ZANTAC) 150 MG tablet; Take 1 tablet (150 mg) by mouth 2 times daily  Dispense: 180 tablet; Refill: 3    11. Need for pneumococcal vaccination  - Pneumococcal vaccine 23 valent PPSV23  (Pneumovax) [71411]  - ADMIN: Vaccine, Initial (81359)  - ADMIN PNEUMOVAX VACCINE (For MEDICARE Patients ONLY) []    12. Mass of head  Await info from neurosurgery scans and clinic notes. Asked pt to request copies of reports be sent to me    Plan discussion:  Take meds as per medication list. Fill pill boxes weekly (pt's caregiver friend here today and he states he will watch meds closely to insure compliance  Meds refilled  Fasting lab  in late February. For fasting labs, please refrain from eating for 8 hours or more.  Be sure to  drink water and take your  medications the day of the test.   Pneumococal vaccine  Follow-up with neurosurgery as scheduled with CT scan brain       Logan Briones MD  Internal Medicine Department  [facility]

## 2018-01-26 NOTE — MR AVS SNAPSHOT
After Visit Summary   1/26/2018    Susi Aguila    MRN: [id number]           Patient Information     Date Of Birth          1945        Visit Information        Provider Department      1/26/2018 10:30 AM Logan Briones MD [facility]        Today's Diagnoses     Type 2 diabetes mellitus with other neurologic complication, without long-term current use of insulin (H)    -  1    Hypothyroidism, unspecified type        Essential hypertension        Hyperlipidemia LDL goal <100        Restless leg        Neuropathy        Insomnia, unspecified type        Stricture and stenosis of esophagus        Gastroesophageal reflux disease, esophagitis presence not specified          Care Instructions    Take meds as per medication list. Fill pill boxes  Fasting lab in late February. For fasting labs, please refrain from eating for 8 hours or more.  Be sure to  drink water and take your  medications the day of the test.   Pneumococal vaccine          Follow-ups after your visit        Future tests that were ordered for you today     Open Future Orders        Priority Expected Expires Ordered    Comprehensive metabolic panel Routine 2/26/2018 1/26/2019 1/26/2018    Lipid panel reflex to direct LDL Fasting Routine 2/26/2018 1/26/2019 1/26/2018    Hemoglobin A1c Routine 2/26/2018 1/26/2019 1/26/2018    TSH with free T4 reflex Routine 2/26/2018 1/26/2019 1/26/2018            Who to contact     If you have questions or need follow up information about today's clinic visit or your schedule please contact [facility] directly at [phone].  Normal or non-critical lab and imaging results will be communicated to you by MyChart, letter or phone within 4 business days after the clinic has received the results. If you do not hear from us within 7 days, please contact the clinic through MyChart or phone. If you have a critical or abnormal lab result, we will notify you  "by phone as soon as possible.  Submit refill requests through [employer] or call your pharmacy and they will forward the refill request to us. Please allow 3 business days for your refill to be completed.          Additional Information About Your Visit        [employer] Information     [employer] lets you send messages to your doctor, view your test results, renew your prescriptions, schedule appointments and more. To sign up, go to [email or web address] . Click on \"Log in\" on the left side of the screen, which will take you to the Welcome page. Then click on \"Sign up Now\" on the right side of the page.     You will be asked to enter the access code listed below, as well as some personal information. Please follow the directions to create your username and password.     Your access code is: [email or web address]  Expires: 2018 11:25 AM     Your access code will  in 90 days. If you need help or a new code, please call your [facility] clinic or [phone].        Care EveryWhere ID     This is your Care EveryWhere ID. This could be used by other organizations to access your [facility] medical records  [id number]        Your Vitals Were     Pulse Temperature Respirations Pulse Oximetry BMI (Body Mass Index)       68 98.5  F (36.9  C) (Oral) 16 92% 25.23 kg/m2        Blood Pressure from Last 3 Encounters:   18 130/78   08/10/17 121/78   17 128/84    Weight from Last 3 Encounters:   18 147 lb (66.7 kg)   17 153 lb (69.4 kg)   08/10/17 153 lb 8 oz (69.6 kg)              We Performed the Following     DEPRESSION ACTION PLAN (DAP)          Today's Medication Changes          These changes are accurate as of 18 11:25 AM.  If you have any questions, ask your nurse or doctor.               These medicines have changed or have updated prescriptions.        Dose/Directions    levothyroxine 100 MCG tablet   Commonly known as:  SYNTHROID/LEVOTHROID   This may have changed:  additional instructions   Used " for:  Hypothyroidism, unspecified type   Changed by:  Logan Briones MD        1 tab daily.   Quantity:  90 tablet   Refills:  0       lisinopril 20 MG tablet   Commonly known as:  PRINIVIL/ZESTRIL   This may have changed:  See the new instructions.   Used for:  Essential hypertension   Changed by:  Logan Briones MD        TAKE 1 TABLET(20 MG) BY MOUTH DAILY   Quantity:  90 tablet   Refills:  3       ROPINIROLE HYDROCHLORIDE 4 MG Tabs   This may have changed:  See the new instructions.   Used for:  Restless leg   Changed by:  Logan Briones MD        TAKE 1 TABLET(4 MG) BY MOUTH AT BEDTIME   Quantity:  90 tablet   Refills:  3            Where to get your medicines      These medications were [facility] to [facility] [address] [place] [address] - [address] [facility]  [address] [facility], [place] [address]     Phone:  [phone]     atorvastatin 40 MG tablet    gabapentin 600 MG tablet    levothyroxine 100 MCG tablet    lisinopril 20 MG tablet    metFORMIN 1000 MG tablet    omeprazole 20 MG tablet    ranitidine 150 MG tablet    ROPINIROLE HYDROCHLORIDE 4 MG Tabs    traZODone 50 MG tablet                Primary Care Provider Office Phone # Fax #    Logan Briones [phone]       [address] [place] [address]        Equal Access to Services     NORTH VINCENT AH: Hadii aad ku hadasho Soomaali, waaxda luqadaha, qaybta kaalmada adeegyada, waxay idiin hayaan adeyoly kharatrixie miller. So [facility] [phone].    ATENCIÓN: Si habla español, tiene a cabezas disposición servicios gratuitos de asistencia lingüística. Llame al [phone].    We comply with applicable federal civil rights laws and [place] laws. We do not discriminate on the basis of race, color, national origin, age, disability, sex, sexual orientation, or gender identity.            Thank you!     Thank you for choosing [facility]  for your care. Our goal is always to provide you with excellent care. Hearing back  from our patients is one way we can continue to improve our services. Please take a few minutes to complete the written survey that you may receive in the mail after your visit with us. Thank you!             Your Updated Medication List - Protect others around you: Learn how to safely use, store and throw away your medicines at www.disposemymeds.org.          This list is accurate as of 1/26/18 11:25 AM.  Always use your most recent med list.                   Brand Name Dispense Instructions for use Diagnosis    acyclovir 200 MG capsule    ZOVIRAX    25 capsule    For 5 days  per herpes outbreak    HSV (herpes simplex virus) infection       atorvastatin 40 MG tablet    LIPITOR    90 tablet    Take 1 tablet (40 mg) by mouth daily    Hyperlipidemia LDL goal <100       gabapentin 600 MG tablet    NEURONTIN    270 tablet    Take 1.5 tablets (900 mg) by mouth 2 times daily    Neuropathy       levothyroxine 100 MCG tablet    SYNTHROID/LEVOTHROID    90 tablet    1 tab daily.    Hypothyroidism, unspecified type       lisinopril 20 MG tablet    PRINIVIL/ZESTRIL    90 tablet    TAKE 1 TABLET(20 MG) BY MOUTH DAILY    Essential hypertension       metFORMIN 1000 MG tablet    GLUCOPHAGE    180 tablet    Take 1 tablet (1,000 mg) by mouth 2 times daily (with meals)    Type 2 diabetes mellitus with other neurologic complication, without long-term current use of insulin (H)       naproxen 500 MG tablet    NAPROSYN    180 tablet    TAKE 1 TABLET(500 MG) BY MOUTH TWICE DAILY WITH MEALS AS NEEDED FOR PAIN    Sciatica, unspecified laterality       omeprazole 20 MG tablet    priLOSEC OTC    180 tablet    Take 1 tablet (20 mg) by mouth 2 times daily (before meals)    Stricture and stenosis of esophagus       ranitidine 150 MG tablet    ZANTAC    180 tablet    Take 1 tablet (150 mg) by mouth 2 times daily    Gastroesophageal reflux disease, esophagitis presence not specified, Stricture and stenosis of esophagus       ROPINIROLE  HYDROCHLORIDE 4 MG Tabs     90 tablet    TAKE 1 TABLET(4 MG) BY MOUTH AT BEDTIME    Restless leg       tacrolimus 0.1 % ointment    PROTOPIC     Apply topically 2 times daily        traZODone 50 MG tablet    DESYREL    180 tablet    TAKE 2 TABLETS (total 100 MG) BY MOUTH AT BEDTIME    Insomnia, unspecified type

## 2018-01-27 ASSESSMENT — PATIENT HEALTH QUESTIONNAIRE - PHQ9: SUM OF ALL RESPONSES TO PHQ QUESTIONS 1-9: 9

## 2018-01-28 PROBLEM — F32.1 MODERATE MAJOR DEPRESSION (H): Status: ACTIVE | Noted: 2018-01-28

## 2018-02-08 ENCOUNTER — HOSPITAL ENCOUNTER (INPATIENT)
Facility: CLINIC | Age: 73
LOS: 5 days | Discharge: HOME | DRG: 872 | End: 2018-02-13
Attending: EMERGENCY MEDICINE | Admitting: INTERNAL MEDICINE
Payer: MEDICARE

## 2018-02-08 ENCOUNTER — APPOINTMENT (OUTPATIENT)
Dept: CT IMAGING | Facility: CLINIC | Age: 73
DRG: 872 | End: 2018-02-08
Attending: EMERGENCY MEDICINE
Payer: MEDICARE

## 2018-02-08 ENCOUNTER — CARE COORDINATION (OUTPATIENT)
Dept: CARE COORDINATION | Facility: CLINIC | Age: 73
End: 2018-02-08

## 2018-02-08 DIAGNOSIS — N10 PYELONEPHRITIS, ACUTE: ICD-10-CM

## 2018-02-08 DIAGNOSIS — I10 ESSENTIAL HYPERTENSION: ICD-10-CM

## 2018-02-08 DIAGNOSIS — N10 ACUTE PYELONEPHRITIS: Primary | ICD-10-CM

## 2018-02-08 PROBLEM — N12 PYELONEPHRITIS: Status: ACTIVE | Noted: 2018-02-08

## 2018-02-08 LAB
ALBUMIN UR-MCNC: 30 MG/DL
ANION GAP SERPL CALCULATED.3IONS-SCNC: 7 MMOL/L (ref 3–14)
APPEARANCE UR: ABNORMAL
BACTERIA #/AREA URNS HPF: ABNORMAL /HPF
BASOPHILS # BLD AUTO: 0 10E9/L (ref 0–0.2)
BASOPHILS NFR BLD AUTO: 0.2 %
BILIRUB UR QL STRIP: NEGATIVE
BUN SERPL-MCNC: 22 MG/DL (ref 7–30)
CALCIUM SERPL-MCNC: 9 MG/DL (ref 8.5–10.1)
CHLORIDE SERPL-SCNC: 107 MMOL/L (ref 94–109)
CO2 SERPL-SCNC: 24 MMOL/L (ref 20–32)
COLOR UR AUTO: YELLOW
CREAT SERPL-MCNC: 0.94 MG/DL (ref 0.52–1.04)
DIFFERENTIAL METHOD BLD: ABNORMAL
EOSINOPHIL # BLD AUTO: 0.1 10E9/L (ref 0–0.7)
EOSINOPHIL NFR BLD AUTO: 0.6 %
ERYTHROCYTE [DISTWIDTH] IN BLOOD BY AUTOMATED COUNT: 14.6 % (ref 10–15)
GFR SERPL CREATININE-BSD FRML MDRD: 58 ML/MIN/1.7M2
GLUCOSE BLDC GLUCOMTR-MCNC: 125 MG/DL (ref 70–99)
GLUCOSE BLDC GLUCOMTR-MCNC: 161 MG/DL (ref 70–99)
GLUCOSE BLDC GLUCOMTR-MCNC: 193 MG/DL (ref 70–99)
GLUCOSE SERPL-MCNC: 170 MG/DL (ref 70–99)
GLUCOSE UR STRIP-MCNC: NEGATIVE MG/DL
HBA1C MFR BLD: 6.4 % (ref 4.3–6)
HCT VFR BLD AUTO: 39.6 % (ref 35–47)
HGB BLD-MCNC: 12.1 G/DL (ref 11.7–15.7)
HGB UR QL STRIP: ABNORMAL
IMM GRANULOCYTES # BLD: 0.1 10E9/L (ref 0–0.4)
IMM GRANULOCYTES NFR BLD: 0.5 %
KETONES UR STRIP-MCNC: NEGATIVE MG/DL
LACTATE BLD-SCNC: 1.7 MMOL/L (ref 0.7–2)
LACTATE SERPL-SCNC: 1.1 MMOL/L (ref 0.4–2)
LEUKOCYTE ESTERASE UR QL STRIP: ABNORMAL
LYMPHOCYTES # BLD AUTO: 1 10E9/L (ref 0.8–5.3)
LYMPHOCYTES NFR BLD AUTO: 7.9 %
MCH RBC QN AUTO: 26.9 PG (ref 26.5–33)
MCHC RBC AUTO-ENTMCNC: 30.6 G/DL (ref 31.5–36.5)
MCV RBC AUTO: 88 FL (ref 78–100)
MONOCYTES # BLD AUTO: 0.2 10E9/L (ref 0–1.3)
MONOCYTES NFR BLD AUTO: 1.2 %
NEUTROPHILS # BLD AUTO: 11.1 10E9/L (ref 1.6–8.3)
NEUTROPHILS NFR BLD AUTO: 89.6 %
NITRATE UR QL: NEGATIVE
NRBC # BLD AUTO: 0 10*3/UL
NRBC BLD AUTO-RTO: 0 /100
PH UR STRIP: 8 PH (ref 5–7)
PLATELET # BLD AUTO: 270 10E9/L (ref 150–450)
POTASSIUM SERPL-SCNC: 4.5 MMOL/L (ref 3.4–5.3)
RBC # BLD AUTO: 4.49 10E12/L (ref 3.8–5.2)
RBC #/AREA URNS AUTO: >182 /HPF (ref 0–2)
SODIUM SERPL-SCNC: 138 MMOL/L (ref 133–144)
SOURCE: ABNORMAL
SP GR UR STRIP: 1.01 (ref 1–1.03)
UROBILINOGEN UR STRIP-MCNC: 2 MG/DL (ref 0–2)
WBC # BLD AUTO: 12.4 10E9/L (ref 4–11)
WBC #/AREA URNS AUTO: 109 /HPF (ref 0–2)

## 2018-02-08 PROCEDURE — 25000132 ZZH RX MED GY IP 250 OP 250 PS 637: Mod: GY | Performed by: PHYSICIAN ASSISTANT

## 2018-02-08 PROCEDURE — A9270 NON-COVERED ITEM OR SERVICE: HCPCS | Mod: GY | Performed by: INTERNAL MEDICINE

## 2018-02-08 PROCEDURE — 87186 SC STD MICRODIL/AGAR DIL: CPT | Performed by: EMERGENCY MEDICINE

## 2018-02-08 PROCEDURE — 99223 1ST HOSP IP/OBS HIGH 75: CPT | Mod: AI | Performed by: INTERNAL MEDICINE

## 2018-02-08 PROCEDURE — 85025 COMPLETE CBC W/AUTO DIFF WBC: CPT | Performed by: EMERGENCY MEDICINE

## 2018-02-08 PROCEDURE — 36415 COLL VENOUS BLD VENIPUNCTURE: CPT | Performed by: INTERNAL MEDICINE

## 2018-02-08 PROCEDURE — 00000146 ZZHCL STATISTIC GLUCOSE BY METER IP

## 2018-02-08 PROCEDURE — 36415 COLL VENOUS BLD VENIPUNCTURE: CPT

## 2018-02-08 PROCEDURE — 87088 URINE BACTERIA CULTURE: CPT | Performed by: EMERGENCY MEDICINE

## 2018-02-08 PROCEDURE — 80048 BASIC METABOLIC PNL TOTAL CA: CPT | Performed by: EMERGENCY MEDICINE

## 2018-02-08 PROCEDURE — 96361 HYDRATE IV INFUSION ADD-ON: CPT

## 2018-02-08 PROCEDURE — 83605 ASSAY OF LACTIC ACID: CPT | Performed by: INTERNAL MEDICINE

## 2018-02-08 PROCEDURE — A9270 NON-COVERED ITEM OR SERVICE: HCPCS | Mod: GY | Performed by: EMERGENCY MEDICINE

## 2018-02-08 PROCEDURE — 87077 CULTURE AEROBIC IDENTIFY: CPT | Performed by: EMERGENCY MEDICINE

## 2018-02-08 PROCEDURE — 36415 COLL VENOUS BLD VENIPUNCTURE: CPT | Performed by: EMERGENCY MEDICINE

## 2018-02-08 PROCEDURE — 83605 ASSAY OF LACTIC ACID: CPT | Performed by: EMERGENCY MEDICINE

## 2018-02-08 PROCEDURE — A9270 NON-COVERED ITEM OR SERVICE: HCPCS | Mod: GY | Performed by: PHYSICIAN ASSISTANT

## 2018-02-08 PROCEDURE — 96374 THER/PROPH/DIAG INJ IV PUSH: CPT

## 2018-02-08 PROCEDURE — 81001 URINALYSIS AUTO W/SCOPE: CPT | Performed by: EMERGENCY MEDICINE

## 2018-02-08 PROCEDURE — 74176 CT ABD & PELVIS W/O CONTRAST: CPT

## 2018-02-08 PROCEDURE — 87086 URINE CULTURE/COLONY COUNT: CPT | Performed by: EMERGENCY MEDICINE

## 2018-02-08 PROCEDURE — 87800 DETECT AGNT MULT DNA DIREC: CPT | Performed by: EMERGENCY MEDICINE

## 2018-02-08 PROCEDURE — 25000128 H RX IP 250 OP 636: Performed by: PHYSICIAN ASSISTANT

## 2018-02-08 PROCEDURE — 25000132 ZZH RX MED GY IP 250 OP 250 PS 637: Mod: GY | Performed by: EMERGENCY MEDICINE

## 2018-02-08 PROCEDURE — 87040 BLOOD CULTURE FOR BACTERIA: CPT | Performed by: EMERGENCY MEDICINE

## 2018-02-08 PROCEDURE — 99285 EMERGENCY DEPT VISIT HI MDM: CPT | Mod: 25

## 2018-02-08 PROCEDURE — 25000128 H RX IP 250 OP 636: Performed by: EMERGENCY MEDICINE

## 2018-02-08 PROCEDURE — 25000132 ZZH RX MED GY IP 250 OP 250 PS 637: Mod: GY | Performed by: INTERNAL MEDICINE

## 2018-02-08 PROCEDURE — 83036 HEMOGLOBIN GLYCOSYLATED A1C: CPT | Performed by: EMERGENCY MEDICINE

## 2018-02-08 PROCEDURE — 12000000 ZZH R&B MED SURG/OB

## 2018-02-08 RX ORDER — ACETAMINOPHEN 325 MG/1
650 TABLET ORAL EVERY 4 HOURS PRN
Status: DISCONTINUED | OUTPATIENT
Start: 2018-02-08 | End: 2018-02-13 | Stop reason: HOSPADM

## 2018-02-08 RX ORDER — ATORVASTATIN CALCIUM 40 MG/1
40 TABLET, FILM COATED ORAL DAILY
Status: DISCONTINUED | OUTPATIENT
Start: 2018-02-09 | End: 2018-02-13 | Stop reason: HOSPADM

## 2018-02-08 RX ORDER — CEFTRIAXONE SODIUM 1 G/50ML
1 INJECTION, SOLUTION INTRAVENOUS ONCE
Status: COMPLETED | OUTPATIENT
Start: 2018-02-08 | End: 2018-02-08

## 2018-02-08 RX ORDER — DEXTROSE MONOHYDRATE 25 G/50ML
25-50 INJECTION, SOLUTION INTRAVENOUS
Status: DISCONTINUED | OUTPATIENT
Start: 2018-02-08 | End: 2018-02-13 | Stop reason: HOSPADM

## 2018-02-08 RX ORDER — TRAZODONE HYDROCHLORIDE 50 MG/1
50-100 TABLET, FILM COATED ORAL AT BEDTIME
COMMUNITY
End: 2019-02-08

## 2018-02-08 RX ORDER — SODIUM CHLORIDE 9 MG/ML
INJECTION, SOLUTION INTRAVENOUS CONTINUOUS
Status: DISCONTINUED | OUTPATIENT
Start: 2018-02-08 | End: 2018-02-10

## 2018-02-08 RX ORDER — LISINOPRIL 20 MG/1
20 TABLET ORAL DAILY
Status: DISCONTINUED | OUTPATIENT
Start: 2018-02-09 | End: 2018-02-13 | Stop reason: HOSPADM

## 2018-02-08 RX ORDER — TRAZODONE HYDROCHLORIDE 50 MG/1
50 TABLET, FILM COATED ORAL
Status: DISCONTINUED | OUTPATIENT
Start: 2018-02-08 | End: 2018-02-13 | Stop reason: HOSPADM

## 2018-02-08 RX ORDER — LIDOCAINE 40 MG/G
CREAM TOPICAL
Status: DISCONTINUED | OUTPATIENT
Start: 2018-02-08 | End: 2018-02-13 | Stop reason: HOSPADM

## 2018-02-08 RX ORDER — ONDANSETRON 2 MG/ML
4 INJECTION INTRAMUSCULAR; INTRAVENOUS EVERY 6 HOURS PRN
Status: DISCONTINUED | OUTPATIENT
Start: 2018-02-08 | End: 2018-02-13 | Stop reason: HOSPADM

## 2018-02-08 RX ORDER — SODIUM CHLORIDE 9 MG/ML
INJECTION, SOLUTION INTRAVENOUS CONTINUOUS
Status: DISCONTINUED | OUTPATIENT
Start: 2018-02-08 | End: 2018-02-08

## 2018-02-08 RX ORDER — NICOTINE POLACRILEX 4 MG
15-30 LOZENGE BUCCAL
Status: DISCONTINUED | OUTPATIENT
Start: 2018-02-08 | End: 2018-02-13 | Stop reason: HOSPADM

## 2018-02-08 RX ORDER — OXYCODONE HYDROCHLORIDE 5 MG/1
5 TABLET ORAL EVERY 4 HOURS PRN
Status: DISCONTINUED | OUTPATIENT
Start: 2018-02-08 | End: 2018-02-13 | Stop reason: HOSPADM

## 2018-02-08 RX ORDER — ACETAMINOPHEN 500 MG
1000 TABLET ORAL ONCE
Status: COMPLETED | OUTPATIENT
Start: 2018-02-08 | End: 2018-02-08

## 2018-02-08 RX ORDER — NALOXONE HYDROCHLORIDE 0.4 MG/ML
.1-.4 INJECTION, SOLUTION INTRAMUSCULAR; INTRAVENOUS; SUBCUTANEOUS
Status: DISCONTINUED | OUTPATIENT
Start: 2018-02-08 | End: 2018-02-13 | Stop reason: HOSPADM

## 2018-02-08 RX ORDER — ROPINIROLE 1 MG/1
4 TABLET, FILM COATED ORAL AT BEDTIME
Status: DISCONTINUED | OUTPATIENT
Start: 2018-02-08 | End: 2018-02-13 | Stop reason: HOSPADM

## 2018-02-08 RX ORDER — POLYETHYLENE GLYCOL 3350 17 G/17G
17 POWDER, FOR SOLUTION ORAL DAILY PRN
Status: DISCONTINUED | OUTPATIENT
Start: 2018-02-08 | End: 2018-02-13 | Stop reason: HOSPADM

## 2018-02-08 RX ORDER — GABAPENTIN 600 MG/1
1200 TABLET ORAL 2 TIMES DAILY
Status: DISCONTINUED | OUTPATIENT
Start: 2018-02-08 | End: 2018-02-09

## 2018-02-08 RX ORDER — LEVOTHYROXINE SODIUM 100 UG/1
100 TABLET ORAL
Status: DISCONTINUED | OUTPATIENT
Start: 2018-02-09 | End: 2018-02-13 | Stop reason: HOSPADM

## 2018-02-08 RX ORDER — ONDANSETRON 4 MG/1
4 TABLET, ORALLY DISINTEGRATING ORAL EVERY 6 HOURS PRN
Status: DISCONTINUED | OUTPATIENT
Start: 2018-02-08 | End: 2018-02-13 | Stop reason: HOSPADM

## 2018-02-08 RX ORDER — CEFTRIAXONE SODIUM 1 G/50ML
1 INJECTION, SOLUTION INTRAVENOUS EVERY 24 HOURS
Status: DISCONTINUED | OUTPATIENT
Start: 2018-02-09 | End: 2018-02-08

## 2018-02-08 RX ORDER — GABAPENTIN 600 MG/1
1200 TABLET ORAL 2 TIMES DAILY
COMMUNITY
End: 2018-05-09

## 2018-02-08 RX ADMIN — SODIUM CHLORIDE: 9 INJECTION, SOLUTION INTRAVENOUS at 13:49

## 2018-02-08 RX ADMIN — ACETAMINOPHEN 1000 MG: 500 TABLET, FILM COATED ORAL at 12:05

## 2018-02-08 RX ADMIN — ACETAMINOPHEN 650 MG: 325 TABLET, FILM COATED ORAL at 23:11

## 2018-02-08 RX ADMIN — SODIUM CHLORIDE: 9 INJECTION, SOLUTION INTRAVENOUS at 08:37

## 2018-02-08 RX ADMIN — RANITIDINE 150 MG: 150 TABLET ORAL at 19:19

## 2018-02-08 RX ADMIN — GABAPENTIN 1200 MG: 600 TABLET, FILM COATED ORAL at 19:19

## 2018-02-08 RX ADMIN — SODIUM CHLORIDE 1000 ML: 9 INJECTION, SOLUTION INTRAVENOUS at 13:48

## 2018-02-08 RX ADMIN — ROPINIROLE 4 MG: 1 TABLET, FILM COATED ORAL at 23:11

## 2018-02-08 RX ADMIN — CEFTRIAXONE SODIUM 1 G: 1 INJECTION, SOLUTION INTRAVENOUS at 14:12

## 2018-02-08 RX ADMIN — CEFTRIAXONE SODIUM 1 G: 1 INJECTION, SOLUTION INTRAVENOUS at 12:04

## 2018-02-08 RX ADMIN — OXYCODONE HYDROCHLORIDE 5 MG: 5 TABLET ORAL at 17:46

## 2018-02-08 RX ADMIN — OMEPRAZOLE 20 MG: 20 CAPSULE, DELAYED RELEASE ORAL at 17:24

## 2018-02-08 ASSESSMENT — ENCOUNTER SYMPTOMS
HEMATURIA: 1
COUGH: 0
FEVER: 0
VOMITING: 1
FLANK PAIN: 1
ABDOMINAL PAIN: 1
DYSURIA: 0
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
## 2018-02-08 NOTE — ED NOTES
Bed: ED14  Expected date: 2/8/18  Expected time: 7:51 AM  Means of arrival: Ambulance  Comments:  lazaro 592- 72y, F,. Flank pain

## 2018-02-08 NOTE — IP AVS SNAPSHOT
MRN:[id number]                      After Visit Summary   2/8/2018    Susi Aguila    MRN: [id number]           Thank you!     Thank you for choosing [facility] for your care. Our goal is always to provide you with excellent care. Hearing back from our patients is one way we can continue to improve our services. Please take a few minutes to complete the written survey that you may receive in the mail after you visit. If you would like to speak to someone directly about your visit please contact Patient Relations at [phone]. Thank you!          Patient Information     Date Of Birth          1945        Designated Caregiver       Most Recent Value    Caregiver    Will someone help with your care after discharge? yes    Name of designated caregiver Ruben (GISELLE)    Phone number of caregiver [phone]    Caregiver address same as pt      About your hospital stay     You were admitted on:  February 8, 2018 You last received care in the:  [facility]    You were discharged on:  February 13, 2018        Reason for your hospital stay       Acute pyelonephritis                  Who to Call     For medical emergencies, please call 911.  For non-urgent questions about your medical care, please call your primary care provider or clinic, [phone]          Attending Provider     Provider Specialty    Miko Grover MD Emergency Medicine    MeghaHenry larson MD Internal Medicine       Primary Care Provider Office Phone # Fax #    Logan Briones [phone]      After Care Instructions     Diet       Follow this diet upon discharge: Regular diet                  Follow-up Appointments     Follow-up and recommended labs and tests        Follow up with primary care provider, Logan Briones, within 7 days for hospital follow- up.                  Your next 10 appointments already scheduled     Feb 20, 2018  2:00 PM CST   Office Visit with Logan  "RAMA Briones MD   [facility]    [address] [address] [phone]           Bring a current list of meds and any records pertaining to this visit. For Physicals, please bring immunization records and any forms needing to be filled out. Please arrive 10 minutes early to complete paperwork.              Pending Results     No orders found from 2018 to 2018.            Statement of Approval     Ordered          18 0851  I have reviewed and agree with all the recommendations and orders detailed in this document.  EFFECTIVE NOW     Approved and electronically signed by:  Jose Bhatti MD           18 1425  I have reviewed and agree with all the recommendations and orders detailed in this document.  EFFECTIVE NOW     Approved and electronically signed by:  Jose Bhatti MD             Admission Information     Date & Time Provider Department Dept. Phone    2018 Henry Cleveland MD [facility] [phone]      Your Vitals Were     Blood Pressure Temperature Respirations Weight Pulse Oximetry BMI (Body Mass Index)    178/100 98.5  F (36.9  C) (Oral) 16 66.7 kg (147 lb) 94% 25.23 kg/m2      MyChart Information     [employer] lets you send messages to your doctor, view your test results, renew your prescriptions, schedule appointments and more. To sign up, go to [email or web address] . Click on \"Log in\" on the left side of the screen, which will take you to the Welcome page. Then click on \"Sign up Now\" on the right side of the page.     You will be asked to enter the access code listed below, as well as some personal information. Please follow the directions to create your username and password.     Your access code is: [email or web address]  Expires: 2018 11:25 AM     Your access code will  in 90 days. If you need help or a new code, please call your [facility] or " [phone].        Care EveryWhere ID     This is your Care EveryWhere ID. This could be used by other organizations to access your [facility] medical records  [id number]        Equal Access to Services     [facility]INCENT : Hadii aad ku haderastokailey Tyson, wajamieda luqadaha, qaybta kakristi luong, lala nova pilaryoly aguayo alpesh miller. So [facility] [phone].    ATENCIÓN: Si habla español, tiene a cabezas disposición servicios gratuitos de asistencia lingüística. Llame al [phone].    We comply with applicable federal civil rights laws and [place] laws. We do not discriminate on the basis of race, color, national origin, age, disability, sex, sexual orientation, or gender identity.               Review of your medicines      START taking        Dose / Directions    amLODIPine 5 MG tablet   Commonly known as:  NORVASC   Used for:  Essential hypertension        Dose:  5 mg   Start taking on:  2/14/2018   Take 1 tablet (5 mg) by mouth daily   Quantity:  30 tablet   Refills:  1       cefdinir 300 MG capsule   Commonly known as:  OMNICEF   Indication:  Urinary Tract Infection        Dose:  300 mg   Take 1 capsule (300 mg) by mouth 2 times daily for 10 days   Quantity:  20 capsule   Refills:  0       [facility] DIGESTIVE HEALTH Caps        Dose:  1 capsule   Take 1 capsule by mouth 2 times daily for 10 days   Quantity:  20 capsule   Refills:  0         CONTINUE these medicines which may have CHANGED, or have new prescriptions. If we are uncertain of the size of tablets/capsules you have at home, strength may be listed as something that might have changed.        Dose / Directions    * order for DME   This may have changed:  Another medication with the same name was added. Make sure you understand how and when to take each.   Used for:  Neuropathy, Type 2 diabetes mellitus with other neurologic complication, without long-term current use of insulin (H)        Equipment being ordered: Diabetic shoes   Quantity:  1 Device    Refills:  1       * order for DME   This may have changed:  You were already taking a medication with the same name, and this prescription was added. Make sure you understand how and when to take each.   Used for:  Essential hypertension        Equipment being ordered: Blood Pressure Cuff   Quantity:  1 Device   Refills:  0       * Notice:  This list has 2 medication(s) that are the same as other medications prescribed for you. Read the directions carefully, and ask your doctor or other care provider to review them with you.      CONTINUE these medicines which have NOT CHANGED        Dose / Directions    atorvastatin 40 MG tablet   Commonly known as:  LIPITOR   Used for:  Hyperlipidemia LDL goal <100        Dose:  40 mg   Take 1 tablet (40 mg) by mouth daily   Quantity:  90 tablet   Refills:  0       gabapentin 600 MG tablet   Commonly known as:  NEURONTIN        Dose:  1200 mg   Take 1,200 mg by mouth 2 times daily   Refills:  0       levothyroxine 100 MCG tablet   Commonly known as:  SYNTHROID/LEVOTHROID   Used for:  Hypothyroidism, unspecified type        1 tab daily.   Quantity:  90 tablet   Refills:  0       lisinopril 20 MG tablet   Commonly known as:  PRINIVIL/ZESTRIL   Used for:  Essential hypertension        TAKE 1 TABLET(20 MG) BY MOUTH DAILY   Quantity:  90 tablet   Refills:  3       metFORMIN 1000 MG tablet   Commonly known as:  GLUCOPHAGE   Used for:  Type 2 diabetes mellitus with other neurologic complication, without long-term current use of insulin (H)        Dose:  1000 mg   Take 1 tablet (1,000 mg) by mouth 2 times daily (with meals)   Quantity:  180 tablet   Refills:  0       naproxen 500 MG tablet   Commonly known as:  NAPROSYN   Used for:  Sciatica, unspecified laterality        TAKE 1 TABLET(500 MG) BY MOUTH TWICE DAILY WITH MEALS AS NEEDED FOR PAIN   Quantity:  180 tablet   Refills:  2       omeprazole 20 MG tablet   Commonly known as:  priLOSEC OTC   Used for:  Gastroesophageal reflux  disease, esophagitis presence not specified        Dose:  20 mg   Take 1 tablet (20 mg) by mouth 2 times daily (before meals)   Quantity:  180 tablet   Refills:  3       ranitidine 150 MG tablet   Commonly known as:  ZANTAC   Used for:  Gastroesophageal reflux disease, esophagitis presence not specified        Dose:  150 mg   Take 1 tablet (150 mg) by mouth 2 times daily   Quantity:  180 tablet   Refills:  3       ROPINIROLE HYDROCHLORIDE 4 MG Tabs   Used for:  Restless leg        TAKE 1 TABLET(4 MG) BY MOUTH AT BEDTIME   Quantity:  90 tablet   Refills:  3       tacrolimus 0.1 % ointment   Commonly known as:  PROTOPIC        Apply topically 2 times daily (under right eye)   Refills:  0       traZODone 50 MG tablet   Commonly known as:  DESYREL        Dose:   mg   Take  mg by mouth At Bedtime   Refills:  0            Where to get your medicines      These medications were sent to [place], [place] - [address] [facility]  [address] [facility] [address]     Phone:  [phone]     amLODIPine 5 MG tablet    cefdinir 300 MG capsule    [facility]         Some of these will need a paper prescription and others can be bought over the counter. Ask your nurse if you have questions.     Bring a paper prescription for each of these medications     order for DME                Protect others around you: Learn how to safely use, store and throw away your medicines at www.disposemymeds.org.        ANTIBIOTIC INSTRUCTION     You've Been Prescribed an Antibiotic - Now What?  Your healthcare team thinks that you or your loved one might have an infection. Some infections can be treated with antibiotics, which are powerful, life-saving drugs. Like all medications, antibiotics have side effects and should only be used when necessary. There are some important things you should know about your antibiotic treatment.      Your healthcare team may run tests before  you start taking an antibiotic.    Your team may take samples (e.g., from your blood, urine or other areas) to run tests to look for bacteria. These test can be important to determine if you need an antibiotic at all and, if you do, which antibiotic will work best.      Within a few days, your healthcare team might change or even stop your antibiotic.    Your team may start you on an antibiotic while they are working to find out what is making you sick.    Your team might change your antibiotic because test results show that a different antibiotic would be better to treat your infection.    In some cases, once your team has more information, they learn that you do not need an antibiotic at all. They may find out that you don't have an infection, or that the antibiotic you're taking won't work against your infection. For example, an infection caused by a virus can't be treated with antibiotics. Staying on an antibiotic when you don't need it is more likely to be harmful than helpful.      You may experience side effects from your antibiotic.    Like all medications, antibiotics have side effects. Some of these can be serious.    Let you healthcare team know if you have any known allergies when you are admitted to the hospital.    One significant side effect of nearly all antibiotics is the risk of severe and sometimes deadly diarrhea caused by Clostridium difficile (C. Difficile). This occurs when a person takes antibiotics because some good germs are destroyed. Antibiotic use allows C. diificile to take over, putting patients at high risk for this serious infection.    As a patient or caregiver, it is important to understand your or your loved one's antibiotic treatment. It is especially important for caregivers to speak up when patients can't speak for themselves. Here are some important questions to ask your healthcare team.    What infection is this antibiotic treating and how do you know I have that  infection?    What side effects might occur from this antibiotic?    How long will I need to take this antibiotic?    Is it safe to take this antibiotic with other medications or supplements (e.g., vitamins) that I am taking?     Are there any special directions I need to know about taking this antibiotic? For example, should I take it with food?    How will I be monitored to know whether my infection is responding to the antibiotic?    What tests may help to make sure the right antibiotic is prescribed for me?      Information provided by:  www.cdc.gov/getsmart  U.S. Department of Health and Human Services  Centers for disease Control and Prevention  National Center for Emerging and Zoonotic Infectious Diseases  Division of Healthcare Quality Promotion             Medication List: This is a list of all your medications and when to take them. Check marks below indicate your daily home schedule. Keep this list as a reference.      Medications           Morning Afternoon Evening Bedtime As Needed    amLODIPine 5 MG tablet   Commonly known as:  NORVASC   Take 1 tablet (5 mg) by mouth daily   Start taking on:  2/14/2018   Last time this was given:  5 mg on 2/13/2018  8:37 AM                                atorvastatin 40 MG tablet   Commonly known as:  LIPITOR   Take 1 tablet (40 mg) by mouth daily   Last time this was given:  40 mg on 2/12/2018  8:43 PM                                cefdinir 300 MG capsule   Commonly known as:  OMNICEF   Take 1 capsule (300 mg) by mouth 2 times daily for 10 days   Last time this was given:  300 mg on 2/13/2018  8:32 AM                                [facility] DIGESTIVE HEALTH [facility]   Take 1 capsule by mouth 2 times daily for 10 days                                gabapentin 600 MG tablet   Commonly known as:  NEURONTIN   Take 1,200 mg by mouth 2 times daily   Last time this was given:  1,200 mg on 2/13/2018  8:32 AM                                levothyroxine 100 MCG tablet    Commonly known as:  SYNTHROID/LEVOTHROID   1 tab daily.   Last time this was given:  100 mcg on 2/13/2018  8:37 AM                                lisinopril 20 MG tablet   Commonly known as:  PRINIVIL/ZESTRIL   TAKE 1 TABLET(20 MG) BY MOUTH DAILY   Last time this was given:  20 mg on 2/13/2018  8:31 AM                                metFORMIN 1000 MG tablet   Commonly known as:  GLUCOPHAGE   Take 1 tablet (1,000 mg) by mouth 2 times daily (with meals)   Last time this was given:  1,000 mg on 2/13/2018  8:32 AM                                naproxen 500 MG tablet   Commonly known as:  NAPROSYN   TAKE 1 TABLET(500 MG) BY MOUTH TWICE DAILY WITH MEALS AS NEEDED FOR PAIN                                omeprazole 20 MG tablet   Commonly known as:  priLOSEC OTC   Take 1 tablet (20 mg) by mouth 2 times daily (before meals)                                * order for DME   Equipment being ordered: Diabetic shoes                                * order for DME   Equipment being ordered: Blood Pressure Cuff                                ranitidine 150 MG tablet   Commonly known as:  ZANTAC   Take 1 tablet (150 mg) by mouth 2 times daily   Last time this was given:  150 mg on 2/13/2018  8:32 AM                                ROPINIROLE HYDROCHLORIDE 4 MG Tabs   TAKE 1 TABLET(4 MG) BY MOUTH AT BEDTIME   Last time this was given:  4 mg on 2/12/2018  8:43 PM                                tacrolimus 0.1 % ointment   Commonly known as:  PROTOPIC   Apply topically 2 times daily (under right eye)                                traZODone 50 MG tablet   Commonly known as:  DESYREL   Take  mg by mouth At Bedtime                                * Notice:  This list has 2 medication(s) that are the same as other medications prescribed for you. Read the directions carefully, and ask your doctor or other care provider to review them with you.              More Information        Kidney Infection (Adult, Female)    An infection in one  "or both kidneys is called \"pyelonephritis.\" It usually happens when bacteria (or rarely, viruses, fungi, or other disease-causing organisms) get into the kidney. The bacteria (or other disease-causing organisms) can enter the kidneys from the bladder or blood traveling from other parts of the body. A kidney infection can become serious. It can cause severe illness, scarring of the kidneys, or kidney failure if not treated properly.  Common causes for this problem include:    Not keeping the genital area clean and dry, which promotes the growth of bacteria    Wiping back to front which drags bacteria from the rectum toward the urinary opening (urethra)    Wearing tight pants or underwear (this lets moisture build up in the genital area, which helps bacteria grow)    Holding urine in for long periods of time    Dehydration  Kidney infections can cause symptoms similar to a bladder infection. Symptoms include:    Pain (or burning) when urinating    Having to urinate more often than usual    Blood in the urine (pink or red)    Abdominal pain or discomfort, usually in the lower abdomen    Pain in the side or back    Pain above the pubic bone    Fever or chills    Vomiting    Loss of appetite  Treatment is oral antibiotics, or in more severe cases, intramuscular or IV antibiotics. These are started right away and may be changed once urine culture results determine the infecting organisms. Treatment helps prevent a more serious kidney infection.  Medicines  Medicines can help in the treatment of a bladder infection:    Take antibiotics until they are used up, even if you feel better. It is important to finish them to make sure the infection is gone.    Unless another medicine was prescribed, you can use over-the-counter medicines for pain, fever, or discomfort. If you have chronic liver of kidney disease, talk with your healthcare provider before using these medicines. Also talk with your provider if you've ever had a " stomach ulcer or gastrointestinal (GI) bleeding, or are taking blood thinners.  Home care  The following are general care guidelines:    Stay home from work or school. Rest in bed until your fever breaks and you are feeling better, or as advised by your healthcare provider.    Drink lots of fluid unless you must restrict fluids for other medical reasons. This will force the medicine into your urinary system and flush the bacteria out of your body. Ask your healthcare provider how much you should drink.    Avoid sex until you have finished all of your medicine and your symptoms are gone.    Avoid caffeine, alcohol, and spicy foods. These foods may irritate the kidneys and bladder.    Avoid taking bubble baths. Sensitivity to the chemicals in bubble baths can irritate the urethra.    Make sure you wipe from front to back after using the toilet.    Wear loose cloths and cotton underwear.  Prevention  These self-care steps can help prevent future infections:    Drink plenty of fluids to prevent dehydration and flush out the bladder. Do this unless you must restrict fluids for other health reasons, or your healthcare provider told you not to.    Proper cleaning after going to the bathroom in important. Make sure you wipe from front to back after using the toilet.    Urinate more often. Don't try to hold urine in for a long time.    Avoid tight-fitting pants and underwear.    Improve your diet to prevent constipation. Eat more fruits, vegetables, and fiber. Eat less junk and fatty foods. Constipation can make a urinary tract infection more likely. Talk with your healthcare provider if you have trouble with bowel movements.    Urinate right after intercourse to flush out the bladder.  Follow-up care  Follow up with your healthcare provider, or as advised. Additional testing may be needed to make sure the infection has cleared. Close follow-up and further testing is very important to find the cause and to prevent future  infections.  If a urine culture was done, you will be contacted if your treatment needs to be changed. If directed, you may call to find out the results.  If you had an X-ray, CT scan, or other diagnostic test, you will be notified of any new findings that may affect your care.  Call 911  Call 911 if any of the following occur:    Trouble breathing    Fainting or loss of consciousness    Rapid or very slow heart rate    Weakness, dizziness, or fainting    Difficulty arousing or confusion  When to seek medical advice  Call your healthcare provider right away if any of these occur:    Fever 100.4 F (38 C) or higher after 48 hours of treatment, or as advised by your healthcare provider    Not feeling better within 1 to 2 days after starting antibiotics    Any symptom that continues after 3 days of treatment    Increasing pain in the stomach, back, side, or groin area    Repeated vomiting    Not able to take prescribed medicine due to nausea or another reason    Bloody, dark-colored, or foul smelling urine    Trouble urinating or decreased urine output    No urine for 8 hours, no tears when crying, sunken eyes, or dry mouth  Date Last Reviewed: 10/1/2016    0440-5792 [employer]. [address] [place] [address], [place] [address]. All rights reserved. This information is not intended as a substitute for professional medical care. Always follow your healthcare professional's instructions.
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
## 2018-02-08 NOTE — IP AVS SNAPSHOT
[facility]    [address] [facility] [address]    Phone:  [phone]    Fax:  [phone]                                       After Visit Summary   2/8/2018    Susi Aguila    MRN: [id number]           After Visit Summary Signature Page     I have received my discharge instructions, and my questions have been answered. I have discussed any challenges I see with this plan with the nurse or doctor.    ..........................................................................................................................................  Patient/Patient Representative Signature      ..........................................................................................................................................  Patient Representative Print Name and Relationship to Patient    ..................................................               ................................................  Date                                            Time    ..........................................................................................................................................  Reviewed by Signature/Title    ...................................................              ..............................................  Date                                                            Time
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
## 2018-02-08 NOTE — PHARMACY-ADMISSION MEDICATION HISTORY
Admission medication history interview status for this patient is complete. See [facility] admission navigator for allergy information, prior to admission medications and immunization status.     Medication history interview source(s):Patient  Medication history resources (including written lists, pill bottles, clinic record): [facility] list.  [facility]    Changes made to PTA medication list:  Added: none  Deleted: Acyclovir (long time ago)  Changed:  Trazodone from 100 mg to  mg at HS.    Actions taken by pharmacist (provider contacted, etc):None     Additional medication history information:None    Medication reconciliation/reorder completed by provider prior to medication history? No      Prior to Admission medications    Medication Sig Last Dose Taking? Auth Provider   gabapentin (NEURONTIN) 600 MG tablet Take 1,200 mg by mouth 2 times daily 2/8/2018 at am Yes Unknown, Entered By History   traZODone (DESYREL) 50 MG tablet Take  mg by mouth At Bedtime 2/6/2018 -- 50 mg at HS Yes Unknown, Entered By History   levothyroxine (SYNTHROID/LEVOTHROID) 100 MCG tablet 1 tab daily. 2/8/2018 at AM Yes Logan Briones MD   lisinopril (PRINIVIL/ZESTRIL) 20 MG tablet TAKE 1 TABLET(20 MG) BY MOUTH DAILY 2/8/2018 at AM Yes Logan Briones MD   metFORMIN (GLUCOPHAGE) 1000 MG tablet Take 1 tablet (1,000 mg) by mouth 2 times daily (with meals) 2/8/2018 at AM Yes Logan Briones MD   atorvastatin (LIPITOR) 40 MG tablet Take 1 tablet (40 mg) by mouth daily 2/8/2018 at AM Yes Logan Briones MD   omeprazole (PRILOSEC OTC) 20 MG tablet Take 1 tablet (20 mg) by mouth 2 times daily (before meals) 2/8/2018 at AM Yes Logan Briones MD   ROPINIROLE HYDROCHLORIDE 4 MG TABS TAKE 1 TABLET(4 MG) BY MOUTH AT BEDTIME 2/6/2018 at PM Yes Logan Briones MD   ranitidine (ZANTAC) 150 MG tablet Take 1 tablet (150 mg) by mouth 2 times daily 2/8/2018 at AM Yes Logan Briones MD   tacrolimus (PROTOPIC) 0.1 % ointment Apply  topically 2 times daily (under right eye) 2/8/2018 at AM Yes Logan Briones MD   naproxen (NAPROSYN) 500 MG tablet TAKE 1 TABLET(500 MG) BY MOUTH TWICE DAILY WITH MEALS AS NEEDED FOR PAIN 2/6/2018 at PM Yes Logan Briones MD   order for DME Equipment being ordered: Diabetic shoes   Logan Briones MD

## 2018-02-08 NOTE — ED PROVIDER NOTES
"  History     Chief Complaint:  Flank Pain    HPI   .Susi Aguila is a 72 year old female with a history of stroke, diabetes,and kidney stones who presents to the emergency department today via EMS for evaluation of flank pain. The patient reports she woke up this morning with right sided flank pain that radiates to her right lower abdomen with associated hematuria, \"pressure\" when urinating, and vomiting. The patient denies dysuria, coughing, and fever. Of note, EMS reports concern for the patient's well being. The patient had a stroke in 2014 and since then has had a PCA and his wife that lives with her. The patient's wife is \"living off the disability checks\" and patient states \"I don't let her touch me.\" Patient notes the plan is to \"get rid of her\" although her PCA is \"wonderful.\"     Allergies:  Amoxicillin  Penicillins     Medications:    levothyroxine (SYNTHROID/LEVOTHROID) 100 MCG tablet  lisinopril (PRINIVIL/ZESTRIL) 20 MG tablet  metFORMIN (GLUCOPHAGE) 1000 MG tablet  atorvastatin (LIPITOR) 40 MG tablet  omeprazole (PRILOSEC OTC) 20 MG tablet  ROPINIROLE HYDROCHLORIDE 4 MG TABS  gabapentin (NEURONTIN) 600 MG tablet  traZODone (DESYREL) 50 MG tablet  ranitidine (ZANTAC) 150 MG tablet  naproxen (NAPROSYN) 500 MG tablet  acyclovir (ZOVIRAX) 200 MG capsule    Past Medical History:    6th nerve palsy   7th nerve palsy   Anemia   Arthritis   Breast mass, left   Cerebral aneurysm   Cervicalgia   Colon polyps   Diaphragmatic hernia without mention of obstruction or gangrene   Dysphagia   Esophageal reflux   Fracture of femoral neck, left   Gastritis   Genital herpes   Hemianopia, homonymous, left   Herpes simplex   Hyperlipidemia   Insomnia   Lumbago   Meningioma    Moderate major depression   Nephrolithiasis   Optic neuropathy, right   Osteopenic  Proteinuria   Pyelonephritis  Recurrent UTI   Restless leg syndrome    Sciatica   Sensorineural hearing loss,   Squamous cell carcinoma   Stricture and stenosis of " "esophagus   Stroke   Third nerve palsy of right eye   Type 2 diabetes mellitus  Hypertension   Hypothyroidism   Urge incontinence   Vertigo   ESBL    Past Surgical History:    Amputate toes  Brain surgery x3  Bunionectomy  Meningioma  Coil procedures, cerebral aneurysms  Intra-extracranial aneurysm bypass  Plastic op/congential facial pain  Cystocele repair  Hardware removal, skull  Upper endoscopy dilation  Orthopedic surgery  Open reduction internal fixation hip bipolar  ESWL  Lithotripsy  Hysterectomy  Colonoscopy  Cystoscopic, retrogrades, insert stent ureter combined x2  Cholecystectomy  Hammer toe reconstruction    Family History:    Breast cancer  Respiratory  Osteoporosis  Cirrhosis  Alcohol/Drug    Social History:  The patient was alone.  Smoking Status: Passive smoke exposure - Never Smoker  Smokeless Tobacco: Never  Alcohol Use: No  Marital Status:        Review of Systems   Constitutional: Negative for fever.   Respiratory: Negative for cough.    Gastrointestinal: Positive for abdominal pain (RLQ) and vomiting.   Genitourinary: Positive for flank pain (right) and hematuria. Negative for dysuria.        \"pressure\" when urinating   All other systems reviewed and are negative.    Physical Exam     Patient Vitals for the past 24 hrs:   BP Temp Temp src Heart Rate Resp SpO2 Weight   02/08/18 1143 - 101.5  F (38.6  C) Temporal - - - -   02/08/18 1030 - - - - - 91 % -   02/08/18 1015 - - - - - 91 % -   02/08/18 1005 148/82 - - - - 92 % -   02/08/18 1000 - - - - - 92 % -   02/08/18 0945 - - - - - 93 % -   02/08/18 0930 - - - - - 94 % -   02/08/18 0924 - - - - - 96 % -   02/08/18 0845 (!) 152/114 - - - - - -   02/08/18 0843 - - - - - 97 % -   02/08/18 0830 (!) 162/95 - - - - 93 % -   02/08/18 0816 146/80 99.5  F (37.5  C) Oral 91 20 96 % 66.7 kg (147 lb)   02/08/18 0815 146/80 - - - - 96 % -     Physical Exam  General: Patient is alert and interactive when I enter the room  Head:  The scalp, face, and " head appear normal  Eyes:  The pupils are equal, round, and reactive to light    Conjunctivae and sclerae are normal  ENT:    External acoustic canals are normal    The oropharynx is normal without erythema.     Uvula is in the midline  Neck:  Normal range of motion  CV:  Regular rate. S1/S2. No murmurs.   Resp:  Lungs are clear without wheezes or rales. No distress  GI:  Abdomen is soft, no rigidity, guarding, or rebound    Right CVA and flank tenderness.     Minimal right lower quadrant tenderness.   MS:  Normal tone. Joints grossly normal without effusions.     No asymmetric leg swelling, calf or thigh tenderness.      Normal motor assessment of all extremities.  Skin:  No rash or lesions noted. Normal capillary refill noted  Neuro: Speech is normal and fluent. Face is symmetric.     Moving all extremities well.   Psych:  Awake. Alert.  Normal affect.  Appropriate interactions.  Lymph: No anterior cervical lymphadenopathy noted    Emergency Department Course     Imaging:  Radiology findings were communicated with the patient who voiced understanding of the findings.  CT Abdomen Pelvis w/o Contrast   IMPRESSION:  1. Right kidney is enlarged with adjacent strand-like density and  slight dilatation of the right ureter. Although there is a tiny stone  in the lower pole calyx of the right kidney, no ureteral calculus is  seen. Differential diagnosis includes recently passed stone with  residual hydronephrosis or infection of the kidney and ureter.  2. Sliding hiatal hernia.  3. Renal cysts.  4. Colonic diverticulosis.  Report per radiology     Laboratory:  Laboratory findings were communicated with the patient who voiced understanding of the findings.  UA: slightly cloudy yellow urine, large blood, pH 8.0 (H), protein albumin, leukocyte esterase,  (H), RBC >182 (H), few bacteria o/w WNL  CBC: WBC 12.4 (H) o/w WNL. (HGB 12.1, )   BMP: Glucose 170 (H), GFR Estimate 58 (L) o/w WNL (Creatinine 0.94)  Lactic  Acid: 1.1    Blood cultures: Pending  Urine Culture Aerobic Bacterial: Pending    Interventions:  0837 NS Infusion 250 mL/hr IV  1204 Rocephin 1g IV  1205 Tylenol 1,000mg PO  In process: NS Bolus 1,000mL IV     Emergency Department Course:  Nursing notes and vitals reviewed.  The patient was sent for a CT Abdomen Pelvis w/o Contrast while in the emergency department, results above.   IV was inserted and blood was drawn for laboratory testing, results above.  The patient provided a urine sample here in the emergency department. This was sent for laboratory testing, findings above.  0815: I performed an exam of the patient as documented above.   0935: Patient rechecked and updated.   0949: I spoke with Social Work regarding their assessment and plan of care.   1002: Patient rechecked and updated.   1135: Patient rechecked and updated.   1143: Patient now has a fever of 101.5 now. Plan for admission   1151: I spoke with Brandee for Dr. Cleveland of the hospitalist service regarding patient's presentation, findings, and plan of care.  1156: I spoke with Social Work again.   Findings and plan explained to the Patient who consents to admission. Discussed the patient with Brandee for Dr. Cleveland, who will admit the patient to a med/surg bed for further monitoring, evaluation, and treatment.  I personally reviewed the laboratory and imaging results with the Patient and answered all related questions prior to admission.    Impression & Plan      Medical Decision Making:  Susi Aguila is a 72 year old female who presents for evaluation of flank pain.  Urinalysis is consistent with a urinary tract infection; given the systemic symptoms present, signs and symptoms consistent with pyelonephritis. There is no clinical evidence of perinephric abscess, emphysematous pyelonephritis, ureterolithiasis, appendicitis, colitis, diverticulitis or any intraabdominal catastrophe.  Patient was given dose of antibiotics in ED; see  above. Patient appears ill and still has systemic symptoms.  I am concerned about the patients ability to tolerate outpatient management and keep antibiotics down as well as risk of bacteremia, ESBL hx, etc.   Blood cultures were sent, fluids given and patient will be admitted to medicine for further cares.  She has simple sepsis, no signs of septic shock or severe sepsis.     Diagnosis:    ICD-10-CM    1. Pyelonephritis, acute N10      Disposition:  Admitted to med/surg     Scribe Disclosure:  I, Korin Oreilly, am serving as a scribe at 8:10 AM on 2/8/2018 to document services personally performed by Miko Grover MD based on my observations and the provider's statements to me.     2/8/2018   [facility] EMERGENCY DEPARTMENT       Miko Grover MD  02/08/18 [id number]

## 2018-02-08 NOTE — CONSULTS
"D: SW responding to MD consult.  Susi Aguila is a 72 yr old  woman who resides in an apartment in [place].  Living with her are a PCA, Ruben Mishraedyluis and wife Leticia.   I:   Reviewed chart then met with Susi who stated she had a stroke in 2014 which affected \"my left side\", her memory and ability to recall.  She had expressed to the EMS who transported her to the hospital that \"the wife is living off my disability checks\" and \"I won't let her touch me\", per quotes from MD notes.  When questioned about the situation, she states [employer] was paying for the PCA until June 1 of 2017 when she refused to give them financial information requested and they \"cut me off\".  When asked how Ruben is now getting paid, she responded, \"they live rent free\", have use of her debit card (she deposits money in this account) and use of her car (she is unable to drive).  Her income appears to be from her SSI and spousal maintance.  Pt states she is able to write her own checks and pay the bills with Ruben's assistance. She has talked to the police who informed her the only way to get Leticia to leave is to file an OFP.  She had arranged a ride and was going to complete the paperwork, (she could not recall why it didn't happen).  She states she is aware this is what she needs to do and is willing, knowing Ruben may leave as well.  She has four children, a son, Silvestre lives locally and is listed in her chart.  She indicated he would be supportive of her if he knew what was happening.  This SW spoke with CLAUDETTE Frederick at the [facility] ([phone]) where pt received medical care regarding concerns and Vulnerable Adult Report made.  Filed a verbal [facility] report to [facility], report #[id number].  Pt not aware of report.  A: Susi is A+O, able to made her own decisions, has difficulty recalling information, yet is aware she knows the answer, an post affect of the stroke.  Is protective of " her personal financial information resulting in loss of [facility] services that could be available to her. Isolated and dependent on others. Appears to have family available, reluctant to inform them of her situation. Very pleasant and accepting of assistance this am.  P: CLAUDETTE completed consult.  Addendum:  CLAUDETTE received call from Jam[facility], who is assigned to this report.

## 2018-02-09 ENCOUNTER — APPOINTMENT (OUTPATIENT)
Dept: GENERAL RADIOLOGY | Facility: CLINIC | Age: 73
DRG: 872 | End: 2018-02-09
Attending: INTERNAL MEDICINE
Payer: MEDICARE

## 2018-02-09 LAB
ANION GAP SERPL CALCULATED.3IONS-SCNC: 9 MMOL/L (ref 3–14)
BASOPHILS # BLD AUTO: 0 10E9/L (ref 0–0.2)
BASOPHILS NFR BLD AUTO: 0.2 %
BUN SERPL-MCNC: 27 MG/DL (ref 7–30)
CALCIUM SERPL-MCNC: 7.5 MG/DL (ref 8.5–10.1)
CHLORIDE SERPL-SCNC: 114 MMOL/L (ref 94–109)
CO2 SERPL-SCNC: 20 MMOL/L (ref 20–32)
CREAT SERPL-MCNC: 1.29 MG/DL (ref 0.52–1.04)
DIFFERENTIAL METHOD BLD: ABNORMAL
EOSINOPHIL # BLD AUTO: 0 10E9/L (ref 0–0.7)
EOSINOPHIL NFR BLD AUTO: 0 %
ERYTHROCYTE [DISTWIDTH] IN BLOOD BY AUTOMATED COUNT: 15 % (ref 10–15)
GFR SERPL CREATININE-BSD FRML MDRD: 41 ML/MIN/1.7M2
GLUCOSE BLDC GLUCOMTR-MCNC: 111 MG/DL (ref 70–99)
GLUCOSE BLDC GLUCOMTR-MCNC: 112 MG/DL (ref 70–99)
GLUCOSE BLDC GLUCOMTR-MCNC: 146 MG/DL (ref 70–99)
GLUCOSE BLDC GLUCOMTR-MCNC: 169 MG/DL (ref 70–99)
GLUCOSE BLDC GLUCOMTR-MCNC: 91 MG/DL (ref 70–99)
GLUCOSE BLDC GLUCOMTR-MCNC: 95 MG/DL (ref 70–99)
GLUCOSE SERPL-MCNC: 150 MG/DL (ref 70–99)
HCT VFR BLD AUTO: 31.6 % (ref 35–47)
HGB BLD-MCNC: 9.8 G/DL (ref 11.7–15.7)
IMM GRANULOCYTES # BLD: 0.2 10E9/L (ref 0–0.4)
IMM GRANULOCYTES NFR BLD: 1.1 %
LYMPHOCYTES # BLD AUTO: 1.5 10E9/L (ref 0.8–5.3)
LYMPHOCYTES NFR BLD AUTO: 7.5 %
MCH RBC QN AUTO: 27.6 PG (ref 26.5–33)
MCHC RBC AUTO-ENTMCNC: 31 G/DL (ref 31.5–36.5)
MCV RBC AUTO: 89 FL (ref 78–100)
MONOCYTES # BLD AUTO: 0.9 10E9/L (ref 0–1.3)
MONOCYTES NFR BLD AUTO: 4.7 %
NEUTROPHILS # BLD AUTO: 17.1 10E9/L (ref 1.6–8.3)
NEUTROPHILS NFR BLD AUTO: 86.5 %
NRBC # BLD AUTO: 0 10*3/UL
NRBC BLD AUTO-RTO: 0 /100
PLATELET # BLD AUTO: 189 10E9/L (ref 150–450)
POTASSIUM SERPL-SCNC: 4.3 MMOL/L (ref 3.4–5.3)
RBC # BLD AUTO: 3.55 10E12/L (ref 3.8–5.2)
SODIUM SERPL-SCNC: 143 MMOL/L (ref 133–144)
WBC # BLD AUTO: 19.7 10E9/L (ref 4–11)

## 2018-02-09 PROCEDURE — 80048 BASIC METABOLIC PNL TOTAL CA: CPT | Performed by: PHYSICIAN ASSISTANT

## 2018-02-09 PROCEDURE — 74018 RADEX ABDOMEN 1 VIEW: CPT

## 2018-02-09 PROCEDURE — 25000128 H RX IP 250 OP 636: Performed by: PHYSICIAN ASSISTANT

## 2018-02-09 PROCEDURE — 36415 COLL VENOUS BLD VENIPUNCTURE: CPT | Performed by: PHYSICIAN ASSISTANT

## 2018-02-09 PROCEDURE — 85025 COMPLETE CBC W/AUTO DIFF WBC: CPT | Performed by: PHYSICIAN ASSISTANT

## 2018-02-09 PROCEDURE — 25000132 ZZH RX MED GY IP 250 OP 250 PS 637: Mod: GY | Performed by: ANESTHESIOLOGY

## 2018-02-09 PROCEDURE — 99221 1ST HOSP IP/OBS SF/LOW 40: CPT | Performed by: UROLOGY

## 2018-02-09 PROCEDURE — A9270 NON-COVERED ITEM OR SERVICE: HCPCS | Mod: GY | Performed by: PHYSICIAN ASSISTANT

## 2018-02-09 PROCEDURE — A9270 NON-COVERED ITEM OR SERVICE: HCPCS | Mod: GY | Performed by: ANESTHESIOLOGY

## 2018-02-09 PROCEDURE — 99233 SBSQ HOSP IP/OBS HIGH 50: CPT | Performed by: INTERNAL MEDICINE

## 2018-02-09 PROCEDURE — 25000128 H RX IP 250 OP 636: Performed by: INTERNAL MEDICINE

## 2018-02-09 PROCEDURE — 12000000 ZZH R&B MED SURG/OB

## 2018-02-09 PROCEDURE — 25000132 ZZH RX MED GY IP 250 OP 250 PS 637: Mod: GY | Performed by: PHYSICIAN ASSISTANT

## 2018-02-09 PROCEDURE — 00000146 ZZHCL STATISTIC GLUCOSE BY METER IP

## 2018-02-09 RX ORDER — GABAPENTIN 600 MG/1
600 TABLET ORAL 2 TIMES DAILY
Status: DISCONTINUED | OUTPATIENT
Start: 2018-02-09 | End: 2018-02-10

## 2018-02-09 RX ADMIN — ATORVASTATIN CALCIUM 40 MG: 40 TABLET, FILM COATED ORAL at 09:33

## 2018-02-09 RX ADMIN — SODIUM CHLORIDE: 9 INJECTION, SOLUTION INTRAVENOUS at 00:55

## 2018-02-09 RX ADMIN — OMEPRAZOLE 20 MG: 20 CAPSULE, DELAYED RELEASE ORAL at 06:41

## 2018-02-09 RX ADMIN — SODIUM CHLORIDE 1000 ML: 9 INJECTION, SOLUTION INTRAVENOUS at 00:55

## 2018-02-09 RX ADMIN — GABAPENTIN 1200 MG: 600 TABLET, FILM COATED ORAL at 09:34

## 2018-02-09 RX ADMIN — SODIUM CHLORIDE: 9 INJECTION, SOLUTION INTRAVENOUS at 18:07

## 2018-02-09 RX ADMIN — GABAPENTIN 600 MG: 600 TABLET, FILM COATED ORAL at 21:29

## 2018-02-09 RX ADMIN — RANITIDINE 150 MG: 150 TABLET ORAL at 09:34

## 2018-02-09 RX ADMIN — OMEPRAZOLE 20 MG: 20 CAPSULE, DELAYED RELEASE ORAL at 18:04

## 2018-02-09 RX ADMIN — LEVOTHYROXINE SODIUM 100 MCG: 100 TABLET ORAL at 06:41

## 2018-02-09 RX ADMIN — CEFTRIAXONE 2 G: 2 INJECTION, POWDER, FOR SOLUTION INTRAMUSCULAR; INTRAVENOUS at 14:13

## 2018-02-09 RX ADMIN — SODIUM CHLORIDE 500 ML: 9 INJECTION, SOLUTION INTRAVENOUS at 03:36

## 2018-02-09 RX ADMIN — SODIUM CHLORIDE 500 ML: 9 INJECTION, SOLUTION INTRAVENOUS at 04:55

## 2018-02-09 RX ADMIN — ROPINIROLE 4 MG: 1 TABLET, FILM COATED ORAL at 21:30

## 2018-02-09 ASSESSMENT — ACTIVITIES OF DAILY LIVING (ADL)
DRESS: 2-->ASSISTIVE PERSON
NUMBER_OF_TIMES_PATIENT_HAS_FALLEN_WITHIN_LAST_SIX_MONTHS: 2
RETIRED_COMMUNICATION: 0-->UNDERSTANDS/COMMUNICATES WITHOUT DIFFICULTY
FALL_HISTORY_WITHIN_LAST_SIX_MONTHS: YES
RETIRED_EATING: 0-->INDEPENDENT
COGNITION: 0 - NO COGNITION ISSUES REPORTED
SWALLOWING: 0-->SWALLOWS FOODS/LIQUIDS WITHOUT DIFFICULTY
AMBULATION: 3-->ASSISTIVE EQUIPMENT AND PERSON
WHICH_OF_THE_ABOVE_FUNCTIONAL_RISKS_HAD_A_RECENT_ONSET_OR_CHANGE?: AMBULATION;TRANSFERRING
BATHING: 2-->ASSISTIVE PERSON
TRANSFERRING: 3-->ASSISTIVE EQUIPMENT AND PERSON
TOILETING: 3-->ASSISTIVE EQUIPMENT AND PERSON

## 2018-02-09 NOTE — PLAN OF CARE
Problem: Urinary Tract Infection (Adult)  Goal: Signs and Symptoms of Listed Potential Problems Will be Absent, Minimized or Managed (Urinary Tract Infection)  Signs and symptoms of listed potential problems will be absent, minimized or managed by discharge/transition of care (reference Urinary Tract Infection (Adult) CPG).   Outcome: Improving    Vital signs monitored.  Lung sounds clear.   CMS - baseline left sided weakness due to history of CVA.   Denies pain. New IV placed today by flying cosme RN, IV Rocephin given.   Incontinent of urine. Changed pad as needed. Bladder scanned for 88ml post-void.  Regular diet, tolerating adequately. Blood sugars 150 and 111.   Up with assist x2, mechanical lift. Up in recliner chair this afternoon.

## 2018-02-09 NOTE — PROVIDER NOTIFICATION
REASON FOR CONTACT: FYI:Pt received 2000cc in boluses overnight. Voided 100cc dark brandon urine. Scanned for 116cc. Lungs clear. No edema except slightly in fingers.  PROVIDER CONTACTED: Dr. Cleveland   TIME CONTACTED: now  MODE OF CONTACT: web text

## 2018-02-09 NOTE — PROGRESS NOTES
SWS     D: Received request to see regarding discharge planning.. see SW note from 2/7 for preliminary contact with pt and information regarding social situation. It is noted by SW that pt is currently two person or mechanical lift with transfers, and that pt is currently on o2. Specifics of discharge needs undetermined at this time.. SW following for support and planning.

## 2018-02-09 NOTE — CONSULTS
Consult Date:  02/09/2018      UROLOGY CONSULTATION      REASON FOR CONSULTATION:  Urinary tract infection and recent right flank pain.      HISTORY OF PRESENT ILLNESS:  Isabel Aguila is a 72-year-old woman well known to the urology service with a prior history of stones and urinary tract infections.  She presented to the Emergency Department yesterday morning with right-sided flank pain.  She had a CT scan performed that showed no right-sided ureteral stones present.  However, there was a small nonobstructing stone in the right kidney and perhaps a slight dilatation of the right ureter that was suggestive of a potentially passed ureteral stone.  Her urine appeared infected, as well so she was admitted on IV antibiotics.  I was consulted regarding the CT scan findings.  The patient reports that she continues to feel better since coming into the hospital.  She has had no recurrence of her flank pain since the emergency department.  Please see our previous notes from the clinic for her past medical history.      IMAGING STUDIES:  I reviewed the patient's CT scan images and there is a tiny stone in the lower pole of the right kidney but no other stones present.  The right kidney, otherwise, looks normal with no hydronephrosis present.      URINALYSIS:  She has a great deal of white and red blood cells in the urine.  Urine culture is pending.  Her serum white blood count is 19.      IMPRESSION AND PLAN:  A 72-year-old woman with a likely urinary tract infection and possibly recently passed ureteral stone.  I reviewed her CT scan images and went over them with the patient.  There were no ureteral stones or obstructing stones on the CT scan.  I would expect that she will improve with antibiotics alone.  She should await her urine culture results and go home on appropriate oral antibiotics.  I do not expect she will require any urologic intervention during this hospitalization.  Please do not hesitate to contact us if  there should be any further questions during this hospitalization.         ROSA WHIPPLE MD             D: 2018   T: 2018   MT: OUSMANE      Name:     SHARRI PHILLIPS   MRN:      [id number]        Account:       [id number]   :      1945           Consult Date:  2018      Document: [id number]

## 2018-02-09 NOTE — PLAN OF CARE
Problem: Patient Care Overview  Goal: Plan of Care/Patient Progress Review  Outcome: No Change  Pt slept well. Alert & oriented. Afebrile overnight. Bld cx reports= positive for gm - rods, Klebsiella. Reports relayed to hospitalist and additional boluses ordered. Pt received 2000cc IV boluses overnight. Voided 100cc dark brandon urine. Bladder scanned for 116cc.

## 2018-02-09 NOTE — PROGRESS NOTES
Infection Prevention:    Patient requires Contact precautions because of ESBL: Blood, 2015. Please contact Infection Prevention with any questions/concerns at *[phone].    Mel Perez, ICP

## 2018-02-09 NOTE — PROGRESS NOTES
"Paged Flying Fraser RN:    \"Need new IV ASAP. pushing IV antibiotic now and IV site not working. Positional too. Can you place new IV Stat? FYI limb alert on left arm.\"  "

## 2018-02-09 NOTE — PROVIDER NOTIFICATION
REASON FOR CONTACT: Micro lab reports L)arm blood cultures positive for gram neg rods also.  PROVIDER CONTACTED: Admitting Hospitalist   TIME CONTACTED: now  MODE OF CONTACT: web text

## 2018-02-09 NOTE — PLAN OF CARE
Problem: Urinary Tract Infection (Adult)  Goal: Signs and Symptoms of Listed Potential Problems Will be Absent, Minimized or Managed (Urinary Tract Infection)  Signs and symptoms of listed potential problems will be absent, minimized or managed by discharge/transition of care (reference Urinary Tract Infection (Adult) CPG).   Outcome: No Change  Pt is AOx4, assist x2 with transfers and mechanical lift, incontinent of B/B. Pt has left side weekness d/t previous stroke. Lactic acid triggered for 's, O2 sats 88%, MD paged. Pt was placed on 3L O2 N/C. HR improving. Report given to night RN.

## 2018-02-09 NOTE — PROVIDER NOTIFICATION
REASON FOR CONTACT: Micro lab reports R) arm blood culture positive for Klebsiella Pneumoniae  PROVIDER CONTACTED: Admitting Hospitalist   TIME CONTACTED: now  MODE OF CONTACT: web text

## 2018-02-09 NOTE — PROGRESS NOTES
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     Problem List:   1. Sepsis with isolated Klebisella (pending sensitivites) at B/C and likely at  with acute pyelnoephritis  2. JERROD suspect from sepsis with evidence of possible hydronephrosis and R kidney stranding  3. Prior hx of kidney stones  4. Hx of ESBL E coli (2015). Subsequent cultures showed no recurrence in the past 2 years of recurrent UTI  5. Hx of CVA with left sided residual hemiparesis  6. Non insulin requiring DM  7. Hypertension  8. GERD  9. Hx of RLS  10. Dyslipidemia  11. Hx of intracranial bypass surgeries x2 due to multiple fusiform cerebral aneurysm and meningioma - s/p resection of meningioma    Continue inpatient care. On IV rocephin 2 grams daily. Continue to monitor infection markers and cultures. He has increasing leucocytosis, but not recurrence of flank pain.   Due to recurrent UTI, R kidney stranding and hydronephrosis, suspect with passed stone. Will ask for formal urology evaluation.  IVF support. Monitor urine output. No signs of hypoperfusion with stable hemodynamics  Resumed home regimen for GERD, hypertension  Continue to hold metformin given JERROD. SSI coverage for now.  Patient stated that she takes regular diet at home and is not eating her dysphagia diet here. Will continue with regular diet    DVT Prophylaxis: Pneumatic Compression Devices  Code Status: DNR / DNI  Discharge Dispo: back to home with PCA  Estimated Disch Date / # of Days until Disch: > 2 days        Interval History (Subjective):      Continuing care  today  Seen and examined  Nadege is sleeping but easily arouse.  She has no ongoing nausea/vomiting. No abdominal nor back pain today  Afebrile this morning but had fever last night.  Not agitated, not combative, pleasant and cooperative  Concerns about decreasing urine output. No hypogastric or pelvic pain. No reported diarrhea                  Physical Exam:      Last Vital Signs:  /67 (BP Location: Right arm)  Temp 96.4  F (35.8  C) (Oral)  Resp 18  Wt 66.7 kg (147 lb)  SpO2 94%  BMI 25.23 kg/m2    I/O last 3 completed shifts:  In: 1113 [P.O.:240; I.V.:873]  Out: 100 [Urine:100]  Wt Readings from Last 1 Encounters:   02/08/18 66.7 kg (147 lb)     Vitals:    02/08/18 0816   Weight: 66.7 kg (147 lb)       Constitutional: Awake, alert, cooperative, no apparent distress   Respiratory: Clear to auscultation bilaterally, no crackles or wheezing   Cardiovascular: Regular rate and rhythm, normal S1 and S2, and no murmur noted   Abdomen: Normal bowel sounds, soft, non-distended, non-tender   Skin: No rashes, no cyanosis, dry to touch   Neuro: Alert and oriented x3, baseline facial asymmetry, weak L UE and LE baselinespontaneous and coherent speech   Extremities: No edema, normal range of motion   Other(s): Euthymic mood, not agitated       All other systems: Negative          Medications:      All current medications were reviewed with changes reflected in problem list.         Data:      All new lab and imaging data was reviewed.   Labs:    Recent Labs  Lab 02/08/18  1203 02/08/18  1140 02/08/18  1000   CULT Cultured on the 1st day of incubation:Gram negative rods*  Critical Value/Significant Value, preliminary result only, called to and read back Bette GUALLPA RN ([facility]).  02.09.18 0101 GJS Cultured on the 1st day of incubation:Gram negative rods*  Critical Value/Significant Value, preliminary result only, called to and read back Bette GUALLPA RN ([facility]).  02.09.18 0024 GJS  (Note)POSITIVE for  KLEBSIELLA PNEUMONIAE by [employer] multiplex nucleic acidtest. Final identification and antimicrobial susceptibility testingwill be verified by standard methods.Specimen tested with Verigene multiplex, gram-negative blood culturenucleic acid test for the following targets: Acinetobacter sp.,Citrobacter sp., Enterobacter sp., Proteus sp., E. coli, K.pneumoniae/oxytoca, P. aeruginosa, and the following resistancemarkers: CTXM, KPC, NDM, VIM, IMP and OXA.Critical Value/Significant Value called to and read back by Kyle GARNETT 0247 02.09.18 CF 50,000 to 100,000 colonies/mLLactose fermenting gram negative rods*  Culture in progress       Recent Labs  Lab 02/09/18  0735 02/08/18  0811   WBC 19.7* 12.4*   HGB 9.8* 12.1   HCT 31.6* 39.6   MCV 89 88    270       Recent Labs  Lab 02/09/18  0735 02/08/18  0811    138   POTASSIUM 4.3 4.5   CHLORIDE 114* 107   CO2 20 24   ANIONGAP 9 7   * 170*   BUN 27 22   CR 1.29* 0.94   GFRESTIMATED 41* 58*   GFRESTBLACK 49* 71   ANGELA 7.5* 9.0     No results for input(s): SED, CRP in the last 168 hours.    Recent Labs  Lab 02/09/18  0735 02/09/18  0734 02/09/18  0237 02/08/18  2257 02/08/18  1711 02/08/18  1354 02/08/18  0811   *  --   --   --   --   --  170*   BGM  --  146* 169* 193* 125* 161*  --      No results for input(s): TROPONIN, TROPI, TROPR in the last 168 hours.    Invalid input(s): TROP, TROPONINIES    Recent Labs  Lab 02/08/18  1000   COLOR Yellow   APPEARANCE Slightly Cloudy   URINEGLC Negative   URINEBILI Negative   URINEKETONE Negative   SG 1.009   UBLD Large*   URINEPH 8.0*   PROTEIN 30*   NITRITE Negative   LEUKEST Moderate*   RBCU >182*   WBCU 109*      Imaging:   Results for orders placed or performed during the hospital encounter of 02/08/18   CT Abdomen Pelvis w/o Contrast    Narrative    CT ABDOMEN AND PELVIS WITHOUT CONTRAST   2/8/2018 9:11 AM     HISTORY:  Patient awoke this morning with right flank pain radiating  to the right  lower abdomen, associated with hematuria, urinary  pressure and vomiting. Rule out renal calculus.    TECHNIQUE:   No IV contrast material. Radiation dose for this scan was  reduced using automated exposure control, adjustment of the mA and/or  kV according to patient size, or iterative reconstruction technique.    COMPARISON: 7/5/2017    FINDINGS:    Abdomen: There is enlargement of the right kidney with mild adjacent  strand-like density and strand-like density in the renal sinus fat.  Multiple previously identified right renal cysts are unchanged. A tiny  stone is seen in the right lower pole renal calyx, unchanged. The  right ureter is slightly dilated, but no ureteral calculus is seen.  Only adjacent phleboliths and arterial calcifications are noted. The  left kidney appears normal.    Previous cholecystectomy. Sliding hiatal hernia. Normal appearing  liver, spleen, pancreas and adrenal glands. Slight extrahepatic  biliary dilatation, unchanged. No free air or free fluid. No  adenopathy.    Pelvis: Colonic diverticulosis. Large amount of stool in the rectum.  Small bowel is unremarkable. The appendix is not identified. Previous  hysterectomy and bilateral oophorectomy. Left total hip arthroplasty.  Lumbar degenerative changes.      Impression    IMPRESSION:  1. Right kidney is enlarged with adjacent strand-like density and  slight dilatation of the right ureter. Although there is a tiny stone  in the lower pole calyx of the right kidney, no ureteral calculus is  seen. Differential diagnosis includes recently passed stone with  residual hydronephrosis or infection of the kidney and ureter.  2. Sliding hiatal hernia.  3. Renal cysts.  4. Colonic diverticulosis.    MERY DUNCAN MD     *Note: Due to a large number of results and/or encounters for the requested time period, some results have not been displayed. A complete set of results can be found in Results Review.

## 2018-02-09 NOTE — PROVIDER NOTIFICATION
REASON FOR CONTACT: Micro lab reports R) arm blood cultures positive for gram neg rods.  PROVIDER CONTACTED:  Admitting hospitalist  TIME CONTACTED: now  MODE OF CONTACT: web text

## 2018-02-10 LAB
ANION GAP SERPL CALCULATED.3IONS-SCNC: 8 MMOL/L (ref 3–14)
BACTERIA SPEC CULT: ABNORMAL
BACTERIA SPEC CULT: ABNORMAL
BASOPHILS # BLD AUTO: 0 10E9/L (ref 0–0.2)
BASOPHILS NFR BLD AUTO: 0.3 %
BUN SERPL-MCNC: 25 MG/DL (ref 7–30)
CALCIUM SERPL-MCNC: 7.7 MG/DL (ref 8.5–10.1)
CHLORIDE SERPL-SCNC: 112 MMOL/L (ref 94–109)
CO2 SERPL-SCNC: 19 MMOL/L (ref 20–32)
CREAT SERPL-MCNC: 0.85 MG/DL (ref 0.52–1.04)
DIFFERENTIAL METHOD BLD: ABNORMAL
EOSINOPHIL # BLD AUTO: 0 10E9/L (ref 0–0.7)
EOSINOPHIL NFR BLD AUTO: 0.3 %
ERYTHROCYTE [DISTWIDTH] IN BLOOD BY AUTOMATED COUNT: 15.1 % (ref 10–15)
GFR SERPL CREATININE-BSD FRML MDRD: 66 ML/MIN/1.7M2
GLUCOSE BLDC GLUCOMTR-MCNC: 105 MG/DL (ref 70–99)
GLUCOSE BLDC GLUCOMTR-MCNC: 106 MG/DL (ref 70–99)
GLUCOSE BLDC GLUCOMTR-MCNC: 132 MG/DL (ref 70–99)
GLUCOSE BLDC GLUCOMTR-MCNC: 179 MG/DL (ref 70–99)
GLUCOSE BLDC GLUCOMTR-MCNC: 94 MG/DL (ref 70–99)
GLUCOSE SERPL-MCNC: 108 MG/DL (ref 70–99)
HCT VFR BLD AUTO: 32.3 % (ref 35–47)
HGB BLD-MCNC: 10.1 G/DL (ref 11.7–15.7)
IMM GRANULOCYTES # BLD: 0.1 10E9/L (ref 0–0.4)
IMM GRANULOCYTES NFR BLD: 0.9 %
LYMPHOCYTES # BLD AUTO: 1.2 10E9/L (ref 0.8–5.3)
LYMPHOCYTES NFR BLD AUTO: 8.3 %
Lab: ABNORMAL
MCH RBC QN AUTO: 27.4 PG (ref 26.5–33)
MCHC RBC AUTO-ENTMCNC: 31.3 G/DL (ref 31.5–36.5)
MCV RBC AUTO: 88 FL (ref 78–100)
MONOCYTES # BLD AUTO: 0.6 10E9/L (ref 0–1.3)
MONOCYTES NFR BLD AUTO: 4.3 %
NEUTROPHILS # BLD AUTO: 12.8 10E9/L (ref 1.6–8.3)
NEUTROPHILS NFR BLD AUTO: 85.9 %
NRBC # BLD AUTO: 0 10*3/UL
NRBC BLD AUTO-RTO: 0 /100
PLATELET # BLD AUTO: 186 10E9/L (ref 150–450)
POTASSIUM SERPL-SCNC: 4 MMOL/L (ref 3.4–5.3)
RBC # BLD AUTO: 3.69 10E12/L (ref 3.8–5.2)
SODIUM SERPL-SCNC: 139 MMOL/L (ref 133–144)
SPECIMEN SOURCE: ABNORMAL
WBC # BLD AUTO: 14.9 10E9/L (ref 4–11)

## 2018-02-10 PROCEDURE — 25000132 ZZH RX MED GY IP 250 OP 250 PS 637: Mod: GY | Performed by: INTERNAL MEDICINE

## 2018-02-10 PROCEDURE — 00000146 ZZHCL STATISTIC GLUCOSE BY METER IP

## 2018-02-10 PROCEDURE — A9270 NON-COVERED ITEM OR SERVICE: HCPCS | Mod: GY | Performed by: PHYSICIAN ASSISTANT

## 2018-02-10 PROCEDURE — A9270 NON-COVERED ITEM OR SERVICE: HCPCS | Mod: GY | Performed by: ANESTHESIOLOGY

## 2018-02-10 PROCEDURE — 85025 COMPLETE CBC W/AUTO DIFF WBC: CPT | Performed by: INTERNAL MEDICINE

## 2018-02-10 PROCEDURE — 99232 SBSQ HOSP IP/OBS MODERATE 35: CPT | Performed by: INTERNAL MEDICINE

## 2018-02-10 PROCEDURE — 80048 BASIC METABOLIC PNL TOTAL CA: CPT | Performed by: INTERNAL MEDICINE

## 2018-02-10 PROCEDURE — 12000000 ZZH R&B MED SURG/OB

## 2018-02-10 PROCEDURE — 25000128 H RX IP 250 OP 636: Performed by: PHYSICIAN ASSISTANT

## 2018-02-10 PROCEDURE — 25000132 ZZH RX MED GY IP 250 OP 250 PS 637: Mod: GY | Performed by: PHYSICIAN ASSISTANT

## 2018-02-10 PROCEDURE — 25000128 H RX IP 250 OP 636: Performed by: INTERNAL MEDICINE

## 2018-02-10 PROCEDURE — 25000131 ZZH RX MED GY IP 250 OP 636 PS 637: Mod: GY | Performed by: PHYSICIAN ASSISTANT

## 2018-02-10 PROCEDURE — 36415 COLL VENOUS BLD VENIPUNCTURE: CPT | Performed by: INTERNAL MEDICINE

## 2018-02-10 PROCEDURE — A9270 NON-COVERED ITEM OR SERVICE: HCPCS | Mod: GY | Performed by: INTERNAL MEDICINE

## 2018-02-10 PROCEDURE — 25000132 ZZH RX MED GY IP 250 OP 250 PS 637: Mod: GY | Performed by: ANESTHESIOLOGY

## 2018-02-10 RX ORDER — SODIUM CHLORIDE 9 MG/ML
INJECTION, SOLUTION INTRAVENOUS CONTINUOUS
Status: DISCONTINUED | OUTPATIENT
Start: 2018-02-10 | End: 2018-02-13 | Stop reason: CLARIF

## 2018-02-10 RX ORDER — GABAPENTIN 600 MG/1
1200 TABLET ORAL 2 TIMES DAILY
Status: DISCONTINUED | OUTPATIENT
Start: 2018-02-10 | End: 2018-02-13 | Stop reason: HOSPADM

## 2018-02-10 RX ADMIN — LEVOTHYROXINE SODIUM 100 MCG: 100 TABLET ORAL at 06:10

## 2018-02-10 RX ADMIN — INSULIN ASPART 1 UNITS: 100 INJECTION, SOLUTION INTRAVENOUS; SUBCUTANEOUS at 17:04

## 2018-02-10 RX ADMIN — ACETAMINOPHEN 650 MG: 325 TABLET, FILM COATED ORAL at 06:10

## 2018-02-10 RX ADMIN — GABAPENTIN 1200 MG: 600 TABLET, FILM COATED ORAL at 19:21

## 2018-02-10 RX ADMIN — GABAPENTIN 600 MG: 600 TABLET, FILM COATED ORAL at 06:22

## 2018-02-10 RX ADMIN — SODIUM CHLORIDE: 9 INJECTION, SOLUTION INTRAVENOUS at 11:41

## 2018-02-10 RX ADMIN — RANITIDINE 150 MG: 150 TABLET ORAL at 06:22

## 2018-02-10 RX ADMIN — LISINOPRIL 20 MG: 20 TABLET ORAL at 06:10

## 2018-02-10 RX ADMIN — METFORMIN HYDROCHLORIDE 1000 MG: 500 TABLET ORAL at 17:56

## 2018-02-10 RX ADMIN — POLYETHYLENE GLYCOL 3350 17 G: 17 POWDER, FOR SOLUTION ORAL at 19:22

## 2018-02-10 RX ADMIN — OXYCODONE HYDROCHLORIDE 5 MG: 5 TABLET ORAL at 06:10

## 2018-02-10 RX ADMIN — OMEPRAZOLE 20 MG: 20 CAPSULE, DELAYED RELEASE ORAL at 16:00

## 2018-02-10 RX ADMIN — ROPINIROLE 4 MG: 1 TABLET, FILM COATED ORAL at 21:26

## 2018-02-10 RX ADMIN — OMEPRAZOLE 20 MG: 20 CAPSULE, DELAYED RELEASE ORAL at 06:10

## 2018-02-10 RX ADMIN — SODIUM CHLORIDE: 9 INJECTION, SOLUTION INTRAVENOUS at 02:05

## 2018-02-10 RX ADMIN — CEFTRIAXONE 2 G: 2 INJECTION, POWDER, FOR SOLUTION INTRAMUSCULAR; INTRAVENOUS at 13:06

## 2018-02-10 RX ADMIN — ATORVASTATIN CALCIUM 40 MG: 40 TABLET, FILM COATED ORAL at 19:21

## 2018-02-10 NOTE — PLAN OF CARE
Problem: Urinary Tract Infection (Adult)  Goal: Signs and Symptoms of Listed Potential Problems Will be Absent, Minimized or Managed (Urinary Tract Infection)  Signs and symptoms of listed potential problems will be absent, minimized or managed by discharge/transition of care (reference Urinary Tract Infection (Adult) CPG).   Outcome: No Change  Alert, orientated x4, forgetful this morning. Making conversation. Food appetite poor. Encouraged fluids. IV fluids TKO hard Iv stick. Was up in chair with ceiling lift. Voided continent and incontinent. Up to bedside commode with lift. Wears brief. Mild edema to ankles. Antibiotic. Vitals stable. No temp. Resting between cares today. Bed alarm on for safety. Declined to lay off of buttocks. Assisted with all ADLS, able to feed self.

## 2018-02-10 NOTE — PLAN OF CARE
Problem: Patient Care Overview  Goal: Plan of Care/Patient Progress Review  Outcome: No Change  Pt has been repositioned with 1-2 assist. Forgetful at times. Denies pain. LS clear, BS active, passing flatus. LBM 2/8/18. L sided hemiparesis d/t CVA hx. Weak  vs moderate  on R. Incontinent of urine X 2. Mechanical lift used with 2 assist and is up ion wheelchair for breakfast this morning. Increased BP's this AM and pt received BP meds this morning along with 5mg oxycodone for pain in L leg. SW following.

## 2018-02-10 NOTE — PROGRESS NOTES
[facility]  Hospitalist Progress Note  Jose Bhatti MD 02/10/2018    Reason for Stay (Diagnosis): sepsis from pyelonephritis         Assessment and Plan:      Summary of Stay: Susi Aguila is a 72 year old female with a PMH significant for DM, HTN, HLP, hx of CVA with left sided hemiparesis, chronic 6-7 nerve palsy, hypothyroidism, recurrent UTIs, RLS, GERD, dysphagia and depression, who presents with right flank pain.     Problem List:     1. Sepsis due to acute Pyelonephritis caused by Klebsiella (isolated in blood and urine cultures), most likely a urinary source of bacteremia, with a recently passed stone.  CT scan showed enlargement of the right kidney with stranding concerning for infection vs recently passed stone. Hx of ESBL Klebsiella in 2015, has had 3-4 urine cultures since that time growing Klebsiella that did not have ESBL.    -- Seen by urology, no obstructing stone, no plan for surgery at this time  -- Continue IV Ceftriaxone, narrow based on final cultures, will need a 2 week course of ABx  -- WBC treniding down     2. JERROD suspect from sepsis and dehdyration. Resolved. Stop IVF  -- Gabapentin was decreased due to renal failure, pt asking for her home dose, renal function better, will increase dose accordingly     3. DM - on metformin at home, resume now that renal function better, taking PO, SSI     4. HTN - resume home meds with parameters    5. Hx of CVA - residual L hemiparesis    6. Dysphagia - possible related to stroke, also has stricture and stenosis of the esophagus. Supposed to be on a dysphagia 3 diet with thickened liquids. Reported that she takes regular diet at home and is not eating her dysphagia diet here. Was switched back to regular diet  -- SLP evaluation     7. Hx of intracranial surgeries x2 due to multiple fusiform cerebral aneurysm and meningioma - s/p resection of meningioma.     8. Hypothyroidism - resume home meds  9. HLP -resume home statin  10. RLS -  resume ropinirole   11. GERD - resume Nexium and Zantac    DVT Prophylaxis: Pneumatic Compression Devices  Code Status: DNR / DNI  Discharge Dispo: back to home   Estimated Disch Date / # of Days until Disch: 2 days         Interval History (Subjective):      Chart reviewed and patient seen. Case discussed with nursing staff.     Patient is much more awake, no confusion, mild lethargy at times, feeling better, Denies any chest pain or shortness of breath. No nausea, vomiting or diarrhea. No other complaints voiced.          Physical Exam:      Last Vital Signs:  /86  Temp 96.6  F (35.9  C)  Resp 16  Wt 66.7 kg (147 lb)  SpO2 95%  BMI 25.23 kg/m2    I/O last 3 completed shifts:  In: 5465 [P.O.:680; I.V.:4785]  Out: -   Wt Readings from Last 1 Encounters:   02/08/18 66.7 kg (147 lb)     Vitals:    02/08/18 0816   Weight: 66.7 kg (147 lb)       Constitutional: Awake, alert, no apparent distress   Respiratory: Clear to auscultation bilaterally, no crackles or wheezing   Cardiovascular: Regular rate and rhythm, normal S1 and S2   Abdomen: + bowel sounds, soft, non-distended, non-tender   Skin: No rashes, no cyanosis, dry to touch   Neuro: Alert and awake, baseline facial asymmetry, weak left side, baseline, spontaneous speech   Extremities: No edema, normal range of motion   Other(s): Euthymic mood, not agitated       All other systems: Negative          Medications:      All current medications were reviewed with changes reflected in problem list.         Data:      All new lab and imaging data was reviewed.   Labs:    Recent Labs  Lab 02/08/18  1203 02/08/18  1140 02/08/18  1000   CULT Cultured on the 1st day of incubation:Klebsiella pneumoniae*  Critical Value/Significant Value, preliminary result only, called to and read back byJUSTYNA GUALLPA RN ([facility]).  02.09.18 0101 GJS  Cultured on the 1st day of incubation:Strain 2Klebsiella pneumoniae*  Susceptibility testing done on previous specimen Cultured on  the 1st day of incubation:Klebsiella pneumoniae*  Critical Value/Significant Value, preliminary result only, called to and read back byJUSTYNA GUALLPA RN ([facility]).  02.09.18 0024 GJS  Cultured on the 1st day of incubation:Strain 2Klebsiella pneumoniae*  Susceptibility testing in progress  (Note)POSITIVE for KLEBSIELLA PNEUMONIAE by [employer] multiplex nucleic acidtest. Final identification and antimicrobial susceptibility testingwill be verified by standard methods.Specimen tested with Verigene multiplex, gram-negative blood culturenucleic acid test for the following targets: Acinetobacter sp.,Citrobacter sp., Enterobacter sp., Proteus sp., E. coli, K.pneumoniae/oxytoca, P. aeruginosa, and the following resistancemarkers: CTXM, KPC, NDM, VIM, IMP and OXA.Critical Value/Significant Value called to and read back by Kyle HERRMANN [facility] 0247 02.09.18 CF 50,000 to 100,000 colonies/mLKlebsiella pneumoniae*  10,000 to 50,000 colonies/mLStrain 2Klebsiella pneumoniae*  Susceptibility testing in progress       Recent Labs  Lab 02/10/18  0811 02/09/18  0735 02/08/18  0811   WBC 14.9* 19.7* 12.4*   HGB 10.1* 9.8* 12.1   HCT 32.3* 31.6* 39.6   MCV 88 89 88    189 270       Recent Labs  Lab 02/10/18  0811 02/09/18  0735 02/08/18  0811    143 138   POTASSIUM 4.0 4.3 4.5   CHLORIDE 112* 114* 107   CO2 19* 20 24   ANIONGAP 8 9 7   * 150* 170*   BUN 25 27 22   CR 0.85 1.29* 0.94   GFRESTIMATED 66 41* 58*   GFRESTBLACK 80 49* 71   ANGELA 7.7* 7.5* 9.0     No results for input(s): SED, CRP in the last 168 hours.    Recent Labs  Lab 02/10/18  0811 02/10/18  0724 02/10/18  0153 02/09/18  2111 02/09/18  1702 02/09/18  1506  02/09/18  0735  02/08/18  0811   *  --   --   --   --   --   --  150*  --  170*   BGM  --  106* 94 91 112* 95  < >  --   < >  --    < > = values in this interval not displayed.  No results for input(s): TROPONIN, TROPI, TROPR in the last 168 hours.    Invalid input(s): TROP,  TROPONINIES    Recent Labs  Lab 02/08/18  1000   COLOR Yellow   APPEARANCE Slightly Cloudy   URINEGLC Negative   URINEBILI Negative   URINEKETONE Negative   SG 1.009   UBLD Large*   URINEPH 8.0*   PROTEIN 30*   NITRITE Negative   LEUKEST Moderate*   RBCU >182*   WBCU 109*      Imaging:   Results for orders placed or performed during the hospital encounter of 02/08/18   CT Abdomen Pelvis w/o Contrast    Narrative    CT ABDOMEN AND PELVIS WITHOUT CONTRAST   2/8/2018 9:11 AM     HISTORY:  Patient awoke this morning with right flank pain radiating  to the right lower abdomen, associated with hematuria, urinary  pressure and vomiting. Rule out renal calculus.    TECHNIQUE:   No IV contrast material. Radiation dose for this scan was  reduced using automated exposure control, adjustment of the mA and/or  kV according to patient size, or iterative reconstruction technique.    COMPARISON: 7/5/2017    FINDINGS:    Abdomen: There is enlargement of the right kidney with mild adjacent  strand-like density and strand-like density in the renal sinus fat.  Multiple previously identified right renal cysts are unchanged. A tiny  stone is seen in the right lower pole renal calyx, unchanged. The  right ureter is slightly dilated, but no ureteral calculus is seen.  Only adjacent phleboliths and arterial calcifications are noted. The  left kidney appears normal.    Previous cholecystectomy. Sliding hiatal hernia. Normal appearing  liver, spleen, pancreas and adrenal glands. Slight extrahepatic  biliary dilatation, unchanged. No free air or free fluid. No  adenopathy.    Pelvis: Colonic diverticulosis. Large amount of stool in the rectum.  Small bowel is unremarkable. The appendix is not identified. Previous  hysterectomy and bilateral oophorectomy. Left total hip arthroplasty.  Lumbar degenerative changes.      Impression    IMPRESSION:  1. Right kidney is enlarged with adjacent strand-like density and  slight dilatation of the right  ureter. Although there is a tiny stone  in the lower pole calyx of the right kidney, no ureteral calculus is  seen. Differential diagnosis includes recently passed stone with  residual hydronephrosis or infection of the kidney and ureter.  2. Sliding hiatal hernia.  3. Renal cysts.  4. Colonic diverticulosis.    MERY DUNCAN MD     *Note: Due to a large number of results and/or encounters for the requested time period, some results have not been displayed. A complete set of results can be found in Results Review.

## 2018-02-10 NOTE — PLAN OF CARE
Problem: Urinary Tract Infection (Adult)  Goal: Signs and Symptoms of Listed Potential Problems Will be Absent, Minimized or Managed (Urinary Tract Infection)  Signs and symptoms of listed potential problems will be absent, minimized or managed by discharge/transition of care (reference Urinary Tract Infection (Adult) CPG).   Outcome: No Change  Afebrile.  Denies any pain.  No nausea.  Denies N/T.  Baseline L sided body weakness.  Up with A2 + lift, sat in recliner.  Incont. urine.

## 2018-02-11 ENCOUNTER — APPOINTMENT (OUTPATIENT)
Dept: OCCUPATIONAL THERAPY | Facility: CLINIC | Age: 73
DRG: 872 | End: 2018-02-11
Attending: INTERNAL MEDICINE
Payer: MEDICARE

## 2018-02-11 LAB
ANION GAP SERPL CALCULATED.3IONS-SCNC: 7 MMOL/L (ref 3–14)
BACTERIA SPEC CULT: ABNORMAL
BUN SERPL-MCNC: 14 MG/DL (ref 7–30)
CALCIUM SERPL-MCNC: 7.8 MG/DL (ref 8.5–10.1)
CHLORIDE SERPL-SCNC: 112 MMOL/L (ref 94–109)
CO2 SERPL-SCNC: 21 MMOL/L (ref 20–32)
CREAT SERPL-MCNC: 0.74 MG/DL (ref 0.52–1.04)
ERYTHROCYTE [DISTWIDTH] IN BLOOD BY AUTOMATED COUNT: 14.8 % (ref 10–15)
GFR SERPL CREATININE-BSD FRML MDRD: 77 ML/MIN/1.7M2
GLUCOSE BLDC GLUCOMTR-MCNC: 104 MG/DL (ref 70–99)
GLUCOSE BLDC GLUCOMTR-MCNC: 105 MG/DL (ref 70–99)
GLUCOSE BLDC GLUCOMTR-MCNC: 106 MG/DL (ref 70–99)
GLUCOSE BLDC GLUCOMTR-MCNC: 140 MG/DL (ref 70–99)
GLUCOSE BLDC GLUCOMTR-MCNC: 142 MG/DL (ref 70–99)
GLUCOSE SERPL-MCNC: 105 MG/DL (ref 70–99)
HCT VFR BLD AUTO: 33 % (ref 35–47)
HGB BLD-MCNC: 10.3 G/DL (ref 11.7–15.7)
Lab: ABNORMAL
Lab: ABNORMAL
MCH RBC QN AUTO: 27 PG (ref 26.5–33)
MCHC RBC AUTO-ENTMCNC: 31.2 G/DL (ref 31.5–36.5)
MCV RBC AUTO: 87 FL (ref 78–100)
PLATELET # BLD AUTO: 184 10E9/L (ref 150–450)
POTASSIUM SERPL-SCNC: 3.7 MMOL/L (ref 3.4–5.3)
RBC # BLD AUTO: 3.81 10E12/L (ref 3.8–5.2)
SODIUM SERPL-SCNC: 140 MMOL/L (ref 133–144)
SPECIMEN SOURCE: ABNORMAL
SPECIMEN SOURCE: ABNORMAL
WBC # BLD AUTO: 11.1 10E9/L (ref 4–11)

## 2018-02-11 PROCEDURE — 80048 BASIC METABOLIC PNL TOTAL CA: CPT | Performed by: INTERNAL MEDICINE

## 2018-02-11 PROCEDURE — 25000132 ZZH RX MED GY IP 250 OP 250 PS 637: Mod: GY | Performed by: ANESTHESIOLOGY

## 2018-02-11 PROCEDURE — 36415 COLL VENOUS BLD VENIPUNCTURE: CPT | Performed by: INTERNAL MEDICINE

## 2018-02-11 PROCEDURE — 25000128 H RX IP 250 OP 636: Performed by: PHYSICIAN ASSISTANT

## 2018-02-11 PROCEDURE — 25000132 ZZH RX MED GY IP 250 OP 250 PS 637: Mod: GY | Performed by: INTERNAL MEDICINE

## 2018-02-11 PROCEDURE — 85027 COMPLETE CBC AUTOMATED: CPT | Performed by: INTERNAL MEDICINE

## 2018-02-11 PROCEDURE — A9270 NON-COVERED ITEM OR SERVICE: HCPCS | Mod: GY | Performed by: INTERNAL MEDICINE

## 2018-02-11 PROCEDURE — 97535 SELF CARE MNGMENT TRAINING: CPT | Mod: GO | Performed by: REHABILITATION PRACTITIONER

## 2018-02-11 PROCEDURE — 99232 SBSQ HOSP IP/OBS MODERATE 35: CPT | Performed by: INTERNAL MEDICINE

## 2018-02-11 PROCEDURE — A9270 NON-COVERED ITEM OR SERVICE: HCPCS | Mod: GY | Performed by: ANESTHESIOLOGY

## 2018-02-11 PROCEDURE — 97165 OT EVAL LOW COMPLEX 30 MIN: CPT | Mod: GO | Performed by: REHABILITATION PRACTITIONER

## 2018-02-11 PROCEDURE — 97110 THERAPEUTIC EXERCISES: CPT | Mod: GO | Performed by: REHABILITATION PRACTITIONER

## 2018-02-11 PROCEDURE — A9270 NON-COVERED ITEM OR SERVICE: HCPCS | Mod: GY | Performed by: PHYSICIAN ASSISTANT

## 2018-02-11 PROCEDURE — 12000000 ZZH R&B MED SURG/OB

## 2018-02-11 PROCEDURE — 00000146 ZZHCL STATISTIC GLUCOSE BY METER IP

## 2018-02-11 PROCEDURE — 40000133 ZZH STATISTIC OT WARD VISIT: Performed by: REHABILITATION PRACTITIONER

## 2018-02-11 PROCEDURE — 25000132 ZZH RX MED GY IP 250 OP 250 PS 637: Mod: GY | Performed by: PHYSICIAN ASSISTANT

## 2018-02-11 RX ADMIN — CEFTRIAXONE 2 G: 2 INJECTION, POWDER, FOR SOLUTION INTRAMUSCULAR; INTRAVENOUS at 13:58

## 2018-02-11 RX ADMIN — ATORVASTATIN CALCIUM 40 MG: 40 TABLET, FILM COATED ORAL at 20:45

## 2018-02-11 RX ADMIN — RANITIDINE 150 MG: 150 TABLET ORAL at 08:34

## 2018-02-11 RX ADMIN — ROPINIROLE 4 MG: 1 TABLET, FILM COATED ORAL at 21:56

## 2018-02-11 RX ADMIN — INSULIN ASPART 1 UNITS: 100 INJECTION, SOLUTION INTRAVENOUS; SUBCUTANEOUS at 16:51

## 2018-02-11 RX ADMIN — LEVOTHYROXINE SODIUM 100 MCG: 100 TABLET ORAL at 08:34

## 2018-02-11 RX ADMIN — INSULIN ASPART 1 UNITS: 100 INJECTION, SOLUTION INTRAVENOUS; SUBCUTANEOUS at 13:08

## 2018-02-11 RX ADMIN — POLYETHYLENE GLYCOL 3350 17 G: 17 POWDER, FOR SOLUTION ORAL at 21:56

## 2018-02-11 RX ADMIN — OXYCODONE HYDROCHLORIDE 5 MG: 5 TABLET ORAL at 18:25

## 2018-02-11 RX ADMIN — GABAPENTIN 1200 MG: 600 TABLET, FILM COATED ORAL at 08:34

## 2018-02-11 RX ADMIN — GABAPENTIN 1200 MG: 600 TABLET, FILM COATED ORAL at 20:44

## 2018-02-11 RX ADMIN — LISINOPRIL 20 MG: 20 TABLET ORAL at 08:34

## 2018-02-11 RX ADMIN — OMEPRAZOLE 20 MG: 20 CAPSULE, DELAYED RELEASE ORAL at 16:50

## 2018-02-11 RX ADMIN — OMEPRAZOLE 20 MG: 20 CAPSULE, DELAYED RELEASE ORAL at 08:34

## 2018-02-11 RX ADMIN — METFORMIN HYDROCHLORIDE 1000 MG: 500 TABLET ORAL at 18:19

## 2018-02-11 RX ADMIN — METFORMIN HYDROCHLORIDE 1000 MG: 500 TABLET ORAL at 08:34

## 2018-02-11 ASSESSMENT — PAIN DESCRIPTION - DESCRIPTORS: DESCRIPTORS: ACHING

## 2018-02-11 NOTE — PROGRESS NOTES
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     Problem List:     1. Sepsis due to acute Pyelonephritis caused by Klebsiella (isolated in blood and urine cultures), most likely a urinary source of bacteremia, with a recently passed stone.  CT scan showed enlargement of the right kidney with stranding concerning for infection vs recently passed stone. Hx of ESBL Klebsiella in 2015, has had 3-4 urine cultures since that time growing Klebsiella that did not have ESBL.    -- Seen by urology, no obstructing stone, no plan for surgery at this time  -- Continue IV Ceftriaxone while here, sensitive bacteria, PO ABx at discharge, total of 2 week course   -- WBC trending down     2. JERROD suspect from sepsis and dehdyration. Resolved. Stop IVF  -- Gabapentin was decreased due to renal failure, pt asking for her home dose, renal function better, will increase dose accordingly , tolerating well     3. DM - on metformin at home, resume now that renal function better, taking PO, SSI     4. HTN - resume home meds with parameters    5. Hx of CVA - residual L hemiparesis    6. Dysphagia - possible related to stroke, also has stricture and stenosis of the esophagus. Supposed to be on a dysphagia 3 diet with thickened liquids. Reported that she takes regular diet at home and is not eating her dysphagia diet here. Was switched back to regular diet  -- SLP evaluation     7. Hx of intracranial surgeries x2 due to multiple fusiform cerebral aneurysm and meningioma - s/p resection of meningioma.     8. Hypothyroidism - resume home meds  9. HLP  -resume home statin  10. RLS - resume ropinirole   11. GERD - resume Nexium and Zantac  12. Concern for vulnerable adult - SW consult     DVT Prophylaxis: Pneumatic Compression Devices  Code Status: DNR / DNI  Discharge Dispo: back to home   Estimated Disch Date / # of Days until Disch: 1 day        Interval History (Subjective):      Chart reviewed and patient seen. Case discussed with nursing staff.     Patient is awake, no confusion, feeling better, Denies any chest pain or shortness of breath. No nausea, vomiting or diarrhea. No other complaints voiced.          Physical Exam:      Last Vital Signs:  BP (!) 166/93 (BP Location: Right arm)  Temp 97.8  F (36.6  C) (Oral)  Resp 16  Wt 66.7 kg (147 lb)  SpO2 95%  BMI 25.23 kg/m2    I/O last 3 completed shifts:  In: 2439 [P.O.:1650; I.V.:789]  Out: -   Wt Readings from Last 1 Encounters:   02/08/18 66.7 kg (147 lb)     Vitals:    02/08/18 0816   Weight: 66.7 kg (147 lb)       Constitutional: Awake, alert, no apparent distress   Respiratory: Clear to auscultation bilaterally, no crackles or wheezing   Cardiovascular: Regular rate and rhythm, normal S1 and S2   Abdomen: + bowel sounds, soft, non-distended, non-tender   Skin: No rashes, no cyanosis, dry to touch   Neuro: Alert and awake, baseline facial asymmetry, weak left side, baseline, spontaneous speech   Extremities: No edema, normal range of motion   Other(s): Euthymic mood, not agitated       All other systems: Negative          Medications:      All current medications were reviewed with changes reflected in problem list.         Data:      All new lab and imaging data was reviewed.   Labs:    Recent Labs  Lab 02/08/18  1203 02/08/18  1140 02/08/18  1000   CULT Cultured on the 1st day of incubation:Klebsiella pneumoniae*  Critical Value/Significant Value, preliminary result only, called to and read back byJUSTYNA GUALLPA RN (RHORTS).  02.09.18 0101 GJS  Cultured on the 1st day of incubation:Strain  2Klebsiella pneumoniae*  Susceptibility testing done on previous specimen Cultured on the 1st day of incubation:Klebsiella pneumoniae*  Critical Value/Significant Value, preliminary result only, called to and read back byJUSTYNA GUALLPA RN (RHORTS).  02.09.18 0024 GJS  Cultured on the 1st day of incubation:Strain 2Klebsiella pneumoniae*  (Note)POSITIVE for KLEBSIELLA PNEUMONIAE by DocuTAP multiplex nucleic acidtest. Final identification and antimicrobial susceptibility testingwill be verified by standard methods.Specimen tested with Verigene multiplex, gram-negative blood culturenucleic acid test for the following targets: Acinetobacter sp.,Citrobacter sp., Enterobacter sp., Proteus sp., E. coli, K.pneumoniae/oxytoca, P. aeruginosa, and the following resistancemarkers: CTXM, KPC, NDM, VIM, IMP and OXA.Critical Value/Significant Value called to and read back by Kyle HERRMANN RHORT 0247 02.09.18 CF 50,000 to 100,000 colonies/mLKlebsiella pneumoniae*  10,000 to 50,000 colonies/mLStrain 2Klebsiella pneumoniae*       Recent Labs  Lab 02/11/18  0715 02/10/18  0811 02/09/18  0735   WBC 11.1* 14.9* 19.7*   HGB 10.3* 10.1* 9.8*   HCT 33.0* 32.3* 31.6*   MCV 87 88 89    186 189       Recent Labs  Lab 02/11/18  0715 02/10/18  0811 02/09/18  0735    139 143   POTASSIUM 3.7 4.0 4.3   CHLORIDE 112* 112* 114*   CO2 21 19* 20   ANIONGAP 7 8 9   * 108* 150*   BUN 14 25 27   CR 0.74 0.85 1.29*   GFRESTIMATED 77 66 41*   GFRESTBLACK >90 80 49*   ANGELA 7.8* 7.7* 7.5*     No results for input(s): SED, CRP in the last 168 hours.    Recent Labs  Lab 02/11/18  1143 02/11/18  0728 02/11/18  0715 02/11/18  0159 02/10/18  2124 02/10/18  1702  02/10/18  0811  02/09/18  0735  02/08/18  0811   GLC  --   --  105*  --   --   --   --  108*  --  150*  --  170*   * 106*  --  104* 132* 179*  < >  --   < >  --   < >  --    < > = values in this interval not displayed.  No results for input(s): TROPONIN, TROPI, TROPR in  the last 168 hours.    Invalid input(s): TROP, TROPONINIES    Recent Labs  Lab 02/08/18  1000   COLOR Yellow   APPEARANCE Slightly Cloudy   URINEGLC Negative   URINEBILI Negative   URINEKETONE Negative   SG 1.009   UBLD Large*   URINEPH 8.0*   PROTEIN 30*   NITRITE Negative   LEUKEST Moderate*   RBCU >182*   WBCU 109*      Imaging:   Results for orders placed or performed during the hospital encounter of 02/08/18   CT Abdomen Pelvis w/o Contrast    Narrative    CT ABDOMEN AND PELVIS WITHOUT CONTRAST   2/8/2018 9:11 AM     HISTORY:  Patient awoke this morning with right flank pain radiating  to the right lower abdomen, associated with hematuria, urinary  pressure and vomiting. Rule out renal calculus.    TECHNIQUE:   No IV contrast material. Radiation dose for this scan was  reduced using automated exposure control, adjustment of the mA and/or  kV according to patient size, or iterative reconstruction technique.    COMPARISON: 7/5/2017    FINDINGS:    Abdomen: There is enlargement of the right kidney with mild adjacent  strand-like density and strand-like density in the renal sinus fat.  Multiple previously identified right renal cysts are unchanged. A tiny  stone is seen in the right lower pole renal calyx, unchanged. The  right ureter is slightly dilated, but no ureteral calculus is seen.  Only adjacent phleboliths and arterial calcifications are noted. The  left kidney appears normal.    Previous cholecystectomy. Sliding hiatal hernia. Normal appearing  liver, spleen, pancreas and adrenal glands. Slight extrahepatic  biliary dilatation, unchanged. No free air or free fluid. No  adenopathy.    Pelvis: Colonic diverticulosis. Large amount of stool in the rectum.  Small bowel is unremarkable. The appendix is not identified. Previous  hysterectomy and bilateral oophorectomy. Left total hip arthroplasty.  Lumbar degenerative changes.      Impression    IMPRESSION:  1. Right kidney is enlarged with adjacent strand-like  density and  slight dilatation of the right ureter. Although there is a tiny stone  in the lower pole calyx of the right kidney, no ureteral calculus is  seen. Differential diagnosis includes recently passed stone with  residual hydronephrosis or infection of the kidney and ureter.  2. Sliding hiatal hernia.  3. Renal cysts.  4. Colonic diverticulosis.    MERY DUNCAN MD     *Note: Due to a large number of results and/or encounters for the requested time period, some results have not been displayed. A complete set of results can be found in Results Review.

## 2018-02-11 NOTE — PLAN OF CARE
"Problem: Patient Care Overview  Goal: Plan of Care/Patient Progress Review  Discharge Planner SLP   Patient plan for discharge: Unknown  Current status: Clinical swallow evaluation order received and appreciated. Chart review shows that pt has chronic dysphagia from a previous CVA and has been advised to follow a NDD-III diet with nectar-thick liquids at home. She has been eating regular diet at home and was switched to a regular diet during this admission at her request. MD notes also indicate a history of \"stricture and stenosis of the esophagus\", which may warrant a GI consult if patient demonstrates signs of esophageal dysphagia. Per conversation with RN, patient is tolerating regular diet with no coughing/choking and there is no acute change in her dysphagia related to reason for admission. SLP evaluation not indicated at this time.  Barriers to return to prior living situation: None (from SLP service standpoint)  Recommendations for discharge: Resume baseline diet  Rationale for recommendations: To maximize swallow function and safety       Entered by: Karly Yuen 02/11/2018 9:35 AM      "

## 2018-02-11 NOTE — PLAN OF CARE
Problem: Patient Care Overview  Goal: Plan of Care/Patient Progress Review  Outcome: Improving  Alert and oriented. High BPs this shift. Tolerating regular diet. Pivot transferred from bed to chair with Ax2. At baseline L side of body significantly impaired from previous stroke. Incontinent of urine. Blood glucose levels monitored. Receiving IV Rocephin.

## 2018-02-11 NOTE — PLAN OF CARE
Problem: Urinary Tract Infection (Adult)  Goal: Signs and Symptoms of Listed Potential Problems Will be Absent, Minimized or Managed (Urinary Tract Infection)  Signs and symptoms of listed potential problems will be absent, minimized or managed by discharge/transition of care (reference Urinary Tract Infection (Adult) CPG).   Outcome: No Change  Afebrile.  SBPs max 150s, asymptomatic.  Denies pain.  No nausea, fair appetite.  Denies N/T.  Baseline L sided body weakness.  Up with A2 + lift, sat in recliner.  Incont. urine, attends on.

## 2018-02-11 NOTE — PLAN OF CARE
Problem: Patient Care Overview  Goal: Plan of Care/Patient Progress Review  OT:  eval complete and treatment initiated.  Pt is a 75 year old female admitted with pyelonephritis, sepsis and PMH of DM, HTN, CVA with L hemiparesis.  She reported to have assist of live in PCA for dressing and bathing, but is able to transfer to w/c and toilet independently using a pivot transfer at baseline.  Pt has a w/c, tub/shower with bench and grab bars in place at home.    Discharge Planner OT   Patient plan for discharge: home with assist of PCA  Current status: Pt alert, oriented and able to follow 100% commands.  Supine>sit with min A given extra time and encouragement.  Pt dependent to don socks while sitting EOB.  She requested not to wear gripper slippers as they are too hard to pivot in.  Sit<>stand with min A for transfer bed>w/c<>commode with min A x 2.  Pt needed cueing for safety and sequencing with extra time to safely position B LE and step.  Pt dependent for clothing management and toilet hygiene.  Pt back to w/c for grooming/hygiene with set-up and min A.  OT also provided education in SROM to maintain joint/skin integrity.  Pt able to tolerate shoulder/elbow/wrist flexion/extension x 10 reps each through partial ranges in preparation for ADL training.  Pt is nonambulatory at baseline and OT able to address functional transfer training needs.  No skilled PT needs identified at this time.  Barriers to return to prior living situation: weakness, need for increased level of assist  Recommendations for discharge: home with assist of PCA for all ADLs, functional transfers and IADLs.  Rationale for recommendations: Pt would benefit from continued IP OT to maximize safety and participation in self cares with improved strength and endurance with Home Exercise Program.       Entered by: Slime Campos 02/11/2018 10:21 AM

## 2018-02-11 NOTE — PLAN OF CARE
Problem: Patient Care Overview  Goal: Plan of Care/Patient Progress Review  Outcome: Improving  Pt has been repositioned with 1 assist. Forgetful at times. Denies pain. LS clear, BS active, passing flatus. LBM 2/8/18. L sided hemiparesis d/t CVA hx. Weak  vs moderate  on R. Incontinent of urine X 2. Mechanical lift used with 2 assist. IV at TKO d/t delicate IV. IV Rocephin for abx therapy. .

## 2018-02-11 NOTE — PLAN OF CARE
Problem: Patient Care Overview  Goal: Plan of Care/Patient Progress Review  Discharge Planner PT   Patient plan for discharge: Defer to OT noted  Current status: Orders received, chart reviewed.  Per discussion with OT, no acute PT needs identified.  Will complete order at this time.  Barriers to return to prior living situation: See OT note  Recommendations for discharge: See OT note  Rationale for recommendations: Defer to OT       Entered by: Becky Braswell 02/11/2018 8:56 AM

## 2018-02-11 NOTE — PROGRESS NOTES
02/11/18 1004   Quick Adds   Type of Visit Initial Occupational Therapy Evaluation   Living Environment   Lives With other (see comments)  (PCA Ruben and his wife)   Living Arrangements apartment   Home Accessibility tub/shower is not walk in;grab bars present (bathtub);grab bars present (toilet)   Number of Stairs to Enter Home 0   Number of Stairs Within Home 0   Living Environment Comment Pt reported to have assist with ADLs.  PCA does all cooking and homemaking.   Self-Care   Dominant Hand right   Usual Activity Tolerance fair   Current Activity Tolerance fair   Regular Exercise no   Equipment Currently Used at Home wheelchair, manual;shower chair;grab bar   Activity/Exercise/Self-Care Comment Pt is w/c dependent at baseline.  Able to complete pivot transfers with assist of PCA.   Functional Level Prior   Ambulation 4-->completely dependent   Transferring 3-->assistive equipment and person   Toileting 3-->assistive equipment and person   Bathing 3-->assistive equipment and person   Dressing 2-->assistive person   Eating 0-->independent   Communication 0-->understands/communicates without difficulty   Swallowing 0-->swallows foods/liquids without difficulty   Cognition 0 - no cognition issues reported   Fall history within last six months yes   Number of times patient has fallen within last six months 3   Which of the above functional risks had a recent onset or change? ambulation;transferring;toileting;bathing;fall history       Present no   General Information   Onset of Illness/Injury or Date of Surgery - Date 02/08/18   Referring Physician Dr. Jose Bhatti   Patient/Family Goals Statement Return to home with assist   Additional Occupational Profile Info/Pertinent History of Current Problem Pt is a 75 year old female admitted with pyelonephritis, sepsis and PMH of DM, HTN, CVA with L hemiparesis   Precautions/Limitations fall precautions   General Info Comments activity:  up with  assist   Cognitive Status Examination   Orientation orientation to person, place and time   Level of Consciousness alert   Able to Follow Commands WNL/WFL   Personal Safety (Cognitive) WNL/WFL   Memory intact   Attention No deficits were identified   Organization/Problem Solving No deficits were identified   Executive Function No deficits were identified   Visual Perception   Visual Perception Wears glasses   Pain Assessment   Patient Currently in Pain Yes, see Vital Sign flowsheet   Posture   Posture forward head position   Range of Motion (ROM)   ROM Quick Adds Other (describe)   ROM Comment R UE AROM WFL.  L UE PROM limited by tone at end ranges.   Strength   Manual Muscle Testing Quick Adds Other   Strength Comments R grossly 4/5.  L NT.   Hand Strength   Hand Strength Comments R WFL.  L non functional.   Muscle Tone Assessment   Muscle Tone Quick Adds LUE   Muscle Tone Assessment - LUE moderately increased tone   Coordination   Upper Extremity Coordination Left UE impaired   Mobility   Bed Mobility Comments min A given extra time and encouragement   Transfer Skill: Bed to Chair/Chair to Bed   Level of [place]: Bed to Chair minimum assist (75% patients effort)   Physical Assist/Nonphysical Assist: Bed to Chair 2 persons;verbal cues   Transfer Skill: Sit to Stand   Level of [place]: Sit/Stand minimum assist (75% patients effort)   Physical Assist/Nonphysical Assist: Sit/Stand verbal cues   Transfer Skill: Toilet Transfer   Level of [place]: Toilet minimum assist (75% patients effort)   Physical Assist/Nonphysical Assist: Toilet 2 persons;verbal cues   Assistive Device other (see comments)  (bedside commode)   Lower Body Dressing   Level of [place]: Dress Lower Body maximum assist (25% patients effort)   Physical Assist/Nonphysical Assist: Dress Lower Body set-up required   Toileting   Level of [place]: Toilet maximum assist (25% patients effort)   Physical Assist/Nonphysical Assist:  "Toilet set-up required   Grooming   Level of [place]: Grooming minimum assist (75% patients effort)   Physical Assist/Nonphysical Assist: Grooming set-up required  (sitting up in w/c)   Eating/Self Feeding   Level of [place]: Eating independent   Physical Assist/Nonphysical Assist: Eating set-up required   Activities of Daily Living Analysis   Impairments Contributing to Impaired Activities of Daily Living balance impaired;ROM decreased;strength decreased;muscle tone abnormal   General Therapy Interventions   Planned Therapy Interventions ADL retraining;transfer training;strengthening   Clinical Impression   Criteria for Skilled Therapeutic Interventions Met yes, treatment indicated   OT Diagnosis decreased ADL performance   Influenced by the following impairments weakness, tone   Assessment of Occupational Performance 1-3 Performance Deficits   Identified Performance Deficits Pt with decreased ability to manage dressing, bathing, toileting   Clinical Decision Making (Complexity) Low complexity   Therapy Frequency 5 times/wk   Predicted Duration of Therapy Intervention (days/wks) 1 week   Anticipated Discharge Disposition Home with Assist   Risks and Benefits of Treatment have been explained. Yes   Patient, Family & other staff in agreement with plan of care Yes   [facility] [employer] TM \"6 Clicks\"   2016, Trustees of [facility], under license to [employer].  All rights reserved.   6 Clicks Short Forms Daily Activity Inpatient Short Form   [facility] AM-PAC  \"6 Clicks\" Daily Activity Inpatient Short Form   1. Putting on and taking off regular lower body clothing? 2 - A Lot   2. Bathing (including washing, rinsing, drying)? 2 - A Lot   3. Toileting, which includes using toilet, bedpan or urinal? 2 - A Lot   4. Putting on and taking off regular upper body clothing? 2 - A Lot   5. Taking care of personal grooming such as brushing teeth? 3 - A Little   6. Eating meals? 4 - None   Daily " Activity Raw Score (Score out of 24.Lower scores equate to lower levels of function) 15   Total Evaluation Time   Total Evaluation Time (Minutes) 8

## 2018-02-12 LAB
C DIFF TOX B STL QL: NEGATIVE
GLUCOSE BLDC GLUCOMTR-MCNC: 101 MG/DL (ref 70–99)
GLUCOSE BLDC GLUCOMTR-MCNC: 104 MG/DL (ref 70–99)
GLUCOSE BLDC GLUCOMTR-MCNC: 105 MG/DL (ref 70–99)
GLUCOSE BLDC GLUCOMTR-MCNC: 119 MG/DL (ref 70–99)
GLUCOSE BLDC GLUCOMTR-MCNC: 92 MG/DL (ref 70–99)
SPECIMEN SOURCE: NORMAL

## 2018-02-12 PROCEDURE — 87493 C DIFF AMPLIFIED PROBE: CPT | Performed by: INTERNAL MEDICINE

## 2018-02-12 PROCEDURE — 25000132 ZZH RX MED GY IP 250 OP 250 PS 637: Mod: GY | Performed by: ANESTHESIOLOGY

## 2018-02-12 PROCEDURE — 25000132 ZZH RX MED GY IP 250 OP 250 PS 637: Mod: GY | Performed by: PHYSICIAN ASSISTANT

## 2018-02-12 PROCEDURE — 25000132 ZZH RX MED GY IP 250 OP 250 PS 637: Mod: GY | Performed by: INTERNAL MEDICINE

## 2018-02-12 PROCEDURE — 00000146 ZZHCL STATISTIC GLUCOSE BY METER IP

## 2018-02-12 PROCEDURE — A9270 NON-COVERED ITEM OR SERVICE: HCPCS | Mod: GY | Performed by: INTERNAL MEDICINE

## 2018-02-12 PROCEDURE — 99207 ZZC CDG-MDM COMPONENT: MEETS MODERATE - UP CODED: CPT | Performed by: INTERNAL MEDICINE

## 2018-02-12 PROCEDURE — 99232 SBSQ HOSP IP/OBS MODERATE 35: CPT | Performed by: INTERNAL MEDICINE

## 2018-02-12 PROCEDURE — A9270 NON-COVERED ITEM OR SERVICE: HCPCS | Mod: GY | Performed by: PHYSICIAN ASSISTANT

## 2018-02-12 PROCEDURE — 25000128 H RX IP 250 OP 636: Performed by: INTERNAL MEDICINE

## 2018-02-12 PROCEDURE — A9270 NON-COVERED ITEM OR SERVICE: HCPCS | Mod: GY | Performed by: ANESTHESIOLOGY

## 2018-02-12 PROCEDURE — 12000000 ZZH R&B MED SURG/OB

## 2018-02-12 RX ORDER — CEFDINIR 300 MG/1
300 CAPSULE ORAL EVERY 12 HOURS SCHEDULED
Status: DISCONTINUED | OUTPATIENT
Start: 2018-02-12 | End: 2018-02-13 | Stop reason: HOSPADM

## 2018-02-12 RX ORDER — LACTOBACILLUS RHAMNOSUS GG 10B CELL
1 CAPSULE ORAL 2 TIMES DAILY
Qty: 20 CAPSULE | Refills: 0 | Status: SHIPPED | OUTPATIENT
Start: 2018-02-12 | End: 2018-02-22

## 2018-02-12 RX ORDER — HYDRALAZINE HYDROCHLORIDE 20 MG/ML
10 INJECTION INTRAMUSCULAR; INTRAVENOUS EVERY 4 HOURS PRN
Status: DISCONTINUED | OUTPATIENT
Start: 2018-02-12 | End: 2018-02-13 | Stop reason: HOSPADM

## 2018-02-12 RX ORDER — CEFDINIR 300 MG/1
300 CAPSULE ORAL 2 TIMES DAILY
Qty: 20 CAPSULE | Refills: 0 | Status: SHIPPED | OUTPATIENT
Start: 2018-02-12 | End: 2018-02-22

## 2018-02-12 RX ADMIN — OMEPRAZOLE 20 MG: 20 CAPSULE, DELAYED RELEASE ORAL at 17:10

## 2018-02-12 RX ADMIN — ROPINIROLE 4 MG: 1 TABLET, FILM COATED ORAL at 20:43

## 2018-02-12 RX ADMIN — HYDRALAZINE HYDROCHLORIDE 10 MG: 20 INJECTION INTRAMUSCULAR; INTRAVENOUS at 02:34

## 2018-02-12 RX ADMIN — METFORMIN HYDROCHLORIDE 1000 MG: 500 TABLET ORAL at 17:10

## 2018-02-12 RX ADMIN — CEFDINIR 300 MG: 300 CAPSULE ORAL at 20:42

## 2018-02-12 RX ADMIN — RANITIDINE 150 MG: 150 TABLET ORAL at 08:36

## 2018-02-12 RX ADMIN — CEFDINIR 300 MG: 300 CAPSULE ORAL at 09:50

## 2018-02-12 RX ADMIN — ATORVASTATIN CALCIUM 40 MG: 40 TABLET, FILM COATED ORAL at 20:43

## 2018-02-12 RX ADMIN — GABAPENTIN 1200 MG: 600 TABLET, FILM COATED ORAL at 08:36

## 2018-02-12 RX ADMIN — LISINOPRIL 20 MG: 20 TABLET ORAL at 08:36

## 2018-02-12 RX ADMIN — LEVOTHYROXINE SODIUM 100 MCG: 100 TABLET ORAL at 08:36

## 2018-02-12 RX ADMIN — OMEPRAZOLE 20 MG: 20 CAPSULE, DELAYED RELEASE ORAL at 08:36

## 2018-02-12 RX ADMIN — METFORMIN HYDROCHLORIDE 1000 MG: 500 TABLET ORAL at 08:36

## 2018-02-12 RX ADMIN — GABAPENTIN 1200 MG: 600 TABLET, FILM COATED ORAL at 20:43
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
## 2018-02-12 NOTE — PLAN OF CARE
Problem: Patient Care Overview  Goal: Plan of Care/Patient Progress Review  Outcome: Improving  A2 c pivot, had loose watery, incontinent stool in morning. Then small stool on commode. No diarrhea for last 4hrs. Denies pain. Left sided weakness d/t previous CVA. Switched to oral abx. Loss IV access. A&O but forgetful. Dc today to home where she lives with PCA. Pt states she feels safe returning to home. MD paged about need for dc orders. Pt able to schedule her own ride. Will continue to monitor.

## 2018-02-12 NOTE — PROVIDER NOTIFICATION
Text paged MD pt had episode of diarrhea, pt states d/t food, can pt have order for immodium pleas? thnx

## 2018-02-12 NOTE — PLAN OF CARE
Problem: Patient Care Overview  Goal: Plan of Care/Patient Progress Review  Outcome: Improving  0207 - MD paged - Having increased BP's. 180/93 and 185/85. HR 75. 93% RA. Denies pain. Has morning lisinopril for HTN but no PRNs. Thanks!  0218 - Dr. Dillard placed orders for Hydralazine.    Pt has been repositioned with 1 assist and up with 2 assist, gait belt, pivot to bedside commode. Showered and linens have been changed.  Forgetful at times. Denies pain. LS clear, BS active, passing flatus. LBM 2/8/18. Sticky noted placed in regards to a possible suppository. L sided hemiparesis d/t CVA hx. Weak  vs moderate  on R. Incontinent of urine X 2. Increased BP's this AM and received orders for hydralazine. SW following. Possible discharge to home today.

## 2018-02-12 NOTE — PLAN OF CARE
"Problem: Urinary Tract Infection (Adult)  Goal: Signs and Symptoms of Listed Potential Problems Will be Absent, Minimized or Managed (Urinary Tract Infection)  Signs and symptoms of listed potential problems will be absent, minimized or managed by discharge/transition of care (reference Urinary Tract Infection (Adult) CPG).   Outcome: Improving  Low grade tempt, encouraged hourly CDB/IS, rechecked afebrile.  C/O R back pain, PRN oxycodone x1, declined cold/heat.  No nausea.  Denies N/T.  Baseline L sided body weakness.  Up with A1-2 belt to stand pivot, sat up in wheelchair.  Incont. urine, c/o feeling a \"little constipated\" LBM 2/8, gave PRN miralax & encouraged increase fluids.          "

## 2018-02-12 NOTE — PROGRESS NOTES
Care Transitions Team: Following for CC, discharge planning, and disposition.        PCP, Logan Briones , Follow up scheduled for 2/19 at 2pm.  This was scheduled at the bedside, with pt acceptance.     Will provided PCP handoff on dc.    Dior Piedra RN   Care Transitions Team  [phone]

## 2018-02-12 NOTE — PROGRESS NOTES
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     Problem List:     1. Sepsis due to acute Pyelonephritis caused by Klebsiella (isolated in blood and urine cultures), most likely a urinary source of bacteremia, with a recently passed stone.  CT scan showed enlargement of the right kidney with stranding concerning for infection vs recently passed stone. Hx of ESBL Klebsiella in 2015, has had 3-4 urine cultures since that time growing Klebsiella that did not have ESBL.    -- Seen by urology, no obstructing stone, no plan for surgery at this time  -- Changed to PO antibiotics     2. JERROD suspect from sepsis and dehdyration. Resolved. Stop IVF    3. DM - on metformin     4. HTN - resume home meds with parameters    5. Hx of CVA - Residual Left hemiparesis    6. Dysphagia - possible related to stroke, diet per SLP evaluation     7. Hx of intracranial surgeries x2 due to multiple fusiform cerebral aneurysm and meningioma - s/p resection of meningioma.     8. Hypothyroidism - resume home meds  9. HLP -resume home statin  10. RLS - resume ropinirole   11. GERD - resume Nexium and Zantac  12. Concern for vulnerable adult - SW consult. Patient feels safe returning home     DVT Prophylaxis: Pneumatic Compression Devices  Code Status: DNR / DNI  Discharge Dispo: back to home   Estimated Disch Date / # of Days until Disch: plan for patient to discharge, however, she has onset of diarrhea today, will monitor for worsening, also check C.Diff PCR, anticipate DC tomorrow if doing ok        Interval History (Subjective):      Chart reviewed  and patient seen. Case discussed with nursing staff.     Patient is awake, no confusion,  Denies any chest pain or shortness of breath. No nausea, vomiting ,no abdominal pain, had some loose watery diarrhea this morning, No other complaints voiced.          Physical Exam:      Last Vital Signs:  BP (!) 167/96 (BP Location: Right arm)  Temp 99.3  F (37.4  C) (Oral)  Resp 16  Wt 66.7 kg (147 lb)  SpO2 96%  BMI 25.23 kg/m2    I/O last 3 completed shifts:  In: 583 [P.O.:440; I.V.:143]  Out: -   Wt Readings from Last 1 Encounters:   02/08/18 66.7 kg (147 lb)     Vitals:    02/08/18 0816   Weight: 66.7 kg (147 lb)       Constitutional: Awake, alert, no apparent distress   Respiratory: Clear to auscultation bilaterally, no crackles or wheezing   Cardiovascular: Regular rate and rhythm, normal S1 and S2   Abdomen: + bowel sounds, soft, non-distended, non-tender   Skin: No rashes, no cyanosis, dry to touch   Neuro: Alert and awake, baseline facial asymmetry, weak left side, spontaneous speech   Extremities: No edema, normal range of motion   Other(s): Euthymic mood, not agitated       All other systems: Negative          Medications:      All current medications were reviewed with changes reflected in problem list.         Data:      All new lab and imaging data was reviewed.   Labs:    Recent Labs  Lab 02/08/18  1203 02/08/18  1140 02/08/18  1000   CULT Cultured on the 1st day of incubation:Klebsiella pneumoniae*  Critical Value/Significant Value, preliminary result only, called to and read back Bette GUALLPA RN ([facility]TS).  02.09.18 0101 GJS  Cultured on the 1st day of incubation:Strain 2Klebsiella pneumoniae*  Susceptibility testing done on previous specimen Cultured on the 1st day of incubation:Klebsiella pneumoniae*  Critical Value/Significant Value, preliminary result only, called to and read back Bette GUALLPA RN ([facility]ORTS).  02.09.18 0024 GJS  Cultured on the 1st day of incubation:Strain 2Klebsiella  pneumoniae*  (Note)POSITIVE for KLEBSIELLA PNEUMONIAE by Logic[employer]igene multiplex nucleic acidtest. Final identification and antimicrobial susceptibility testingwill be verified by standard methods.Specimen tested with Verigene multiplex, gram-negative blood culturenucleic acid test for the following targets: Acinetobacter sp.,Citrobacter sp., Enterobacter sp., Proteus sp., E. coli, K.pneumoniae/oxytoca, P. aeruginosa, and the following resistancemarkers: CTXM, KPC, NDM, VIM, IMP and OXA.Critical Value/Significant Value called to and read back by Kyle GARNETT 0247 02.09.18 CF 50,000 to 100,000 colonies/mLKlebsiella pneumoniae*  10,000 to 50,000 colonies/mLStrain 2Klebsiella pneumoniae*       Recent Labs  Lab 02/11/18  0715 02/10/18  0811 02/09/18  0735   WBC 11.1* 14.9* 19.7*   HGB 10.3* 10.1* 9.8*   HCT 33.0* 32.3* 31.6*   MCV 87 88 89    186 189       Recent Labs  Lab 02/11/18  0715 02/10/18  0811 02/09/18  0735    139 143   POTASSIUM 3.7 4.0 4.3   CHLORIDE 112* 112* 114*   CO2 21 19* 20   ANIONGAP 7 8 9   * 108* 150*   BUN 14 25 27   CR 0.74 0.85 1.29*   GFRESTIMATED 77 66 41*   GFRESTBLACK >90 80 49*   ANGELA 7.8* 7.7* 7.5*     No results for input(s): SED, CRP in the last 168 hours.    Recent Labs  Lab 02/12/18  1218 02/12/18  0724 02/12/18  0156 02/11/18  2159 02/11/18  1651  02/11/18  0715  02/10/18  0811  02/09/18  0735  02/08/18  0811   GLC  --   --   --   --   --   --  105*  --  108*  --  150*  --  170*   * 105* 101* 105* 142*  < >  --   < >  --   < >  --   < >  --    < > = values in this interval not displayed.  No results for input(s): TROPONIN, TROPI, TROPR in the last 168 hours.    Invalid input(s): TROP, TROPONINIES    Recent Labs  Lab 02/08/18  1000   COLOR Yellow   APPEARANCE Slightly Cloudy   URINEGLC Negative   URINEBILI Negative   URINEKETONE Negative   SG 1.009   UBLD Large*   URINEPH 8.0*   PROTEIN 30*   NITRITE Negative   LEUKEST Moderate*   RBCU >182*   WBCU  109*      Imaging:   Results for orders placed or performed during the hospital encounter of 02/08/18   CT Abdomen Pelvis w/o Contrast    Narrative    CT ABDOMEN AND PELVIS WITHOUT CONTRAST   2/8/2018 9:11 AM     HISTORY:  Patient awoke this morning with right flank pain radiating  to the right lower abdomen, associated with hematuria, urinary  pressure and vomiting. Rule out renal calculus.    TECHNIQUE:   No IV contrast material. Radiation dose for this scan was  reduced using automated exposure control, adjustment of the mA and/or  kV according to patient size, or iterative reconstruction technique.    COMPARISON: 7/5/2017    FINDINGS:    Abdomen: There is enlargement of the right kidney with mild adjacent  strand-like density and strand-like density in the renal sinus fat.  Multiple previously identified right renal cysts are unchanged. A tiny  stone is seen in the right lower pole renal calyx, unchanged. The  right ureter is slightly dilated, but no ureteral calculus is seen.  Only adjacent phleboliths and arterial calcifications are noted. The  left kidney appears normal.    Previous cholecystectomy. Sliding hiatal hernia. Normal appearing  liver, spleen, pancreas and adrenal glands. Slight extrahepatic  biliary dilatation, unchanged. No free air or free fluid. No  adenopathy.    Pelvis: Colonic diverticulosis. Large amount of stool in the rectum.  Small bowel is unremarkable. The appendix is not identified. Previous  hysterectomy and bilateral oophorectomy. Left total hip arthroplasty.  Lumbar degenerative changes.      Impression    IMPRESSION:  1. Right kidney is enlarged with adjacent strand-like density and  slight dilatation of the right ureter. Although there is a tiny stone  in the lower pole calyx of the right kidney, no ureteral calculus is  seen. Differential diagnosis includes recently passed stone with  residual hydronephrosis or infection of the kidney and ureter.  2. Sliding hiatal hernia.  3.  Renal cysts.  4. Colonic diverticulosis.    MERY DUNCAN MD     *Note: Due to a large number of results and/or encounters for the requested time period, some results have not been displayed. A complete set of results can be found in Results Review.

## 2018-02-13 ENCOUNTER — CARE COORDINATION (OUTPATIENT)
Dept: CARE COORDINATION | Facility: CLINIC | Age: 73
End: 2018-02-13

## 2018-02-13 VITALS
TEMPERATURE: 98.5 F | WEIGHT: 147 LBS | SYSTOLIC BLOOD PRESSURE: 167 MMHG | RESPIRATION RATE: 16 BRPM | DIASTOLIC BLOOD PRESSURE: 101 MMHG | BODY MASS INDEX: 25.23 KG/M2 | OXYGEN SATURATION: 94 %

## 2018-02-13 LAB
GLUCOSE BLDC GLUCOMTR-MCNC: 109 MG/DL (ref 70–99)
GLUCOSE BLDC GLUCOMTR-MCNC: 97 MG/DL (ref 70–99)

## 2018-02-13 PROCEDURE — 25000132 ZZH RX MED GY IP 250 OP 250 PS 637: Mod: GY | Performed by: ANESTHESIOLOGY

## 2018-02-13 PROCEDURE — A9270 NON-COVERED ITEM OR SERVICE: HCPCS | Mod: GY | Performed by: PHYSICIAN ASSISTANT

## 2018-02-13 PROCEDURE — 25000132 ZZH RX MED GY IP 250 OP 250 PS 637: Mod: GY | Performed by: INTERNAL MEDICINE

## 2018-02-13 PROCEDURE — 99239 HOSP IP/OBS DSCHRG MGMT >30: CPT | Performed by: INTERNAL MEDICINE

## 2018-02-13 PROCEDURE — 00000146 ZZHCL STATISTIC GLUCOSE BY METER IP

## 2018-02-13 PROCEDURE — A9270 NON-COVERED ITEM OR SERVICE: HCPCS | Mod: GY | Performed by: INTERNAL MEDICINE

## 2018-02-13 PROCEDURE — 25000132 ZZH RX MED GY IP 250 OP 250 PS 637: Mod: GY | Performed by: PHYSICIAN ASSISTANT

## 2018-02-13 PROCEDURE — A9270 NON-COVERED ITEM OR SERVICE: HCPCS | Mod: GY | Performed by: ANESTHESIOLOGY

## 2018-02-13 RX ORDER — AMLODIPINE BESYLATE 5 MG/1
5 TABLET ORAL DAILY
Status: DISCONTINUED | OUTPATIENT
Start: 2018-02-13 | End: 2018-02-13 | Stop reason: HOSPADM

## 2018-02-13 RX ORDER — AMLODIPINE BESYLATE 5 MG/1
5 TABLET ORAL DAILY
Qty: 30 TABLET | Refills: 1 | Status: SHIPPED | OUTPATIENT
Start: 2018-02-14 | End: 2018-03-14

## 2018-02-13 RX ADMIN — AMLODIPINE BESYLATE 5 MG: 5 TABLET ORAL at 08:37

## 2018-02-13 RX ADMIN — CEFDINIR 300 MG: 300 CAPSULE ORAL at 08:32

## 2018-02-13 RX ADMIN — LEVOTHYROXINE SODIUM 100 MCG: 100 TABLET ORAL at 08:37

## 2018-02-13 RX ADMIN — OMEPRAZOLE 20 MG: 20 CAPSULE, DELAYED RELEASE ORAL at 08:31

## 2018-02-13 RX ADMIN — RANITIDINE 150 MG: 150 TABLET ORAL at 08:32

## 2018-02-13 RX ADMIN — GABAPENTIN 1200 MG: 600 TABLET, FILM COATED ORAL at 08:32

## 2018-02-13 RX ADMIN — LISINOPRIL 20 MG: 20 TABLET ORAL at 08:31

## 2018-02-13 RX ADMIN — METFORMIN HYDROCHLORIDE 1000 MG: 500 TABLET ORAL at 08:32

## 2018-02-13 NOTE — PLAN OF CARE
Problem: Patient Care Overview  Goal: Plan of Care/Patient Progress Review  Outcome: Improving  VS-stable, bp higher 150-160's/80-90's apresoline available >180. Temp 99.3  Lung Sounds-clear, -cough, on room air,   O2-on room air,  GI-had one more soft formed, small stool for us, had 2-3 on day shift, sample sent down--cdiff sent down, continent. bs 92 and 119  -incontinent at times, voided x 1 on toilet, then incontinent, skin intact, no tenderness no open areas  IVF-no iv site  Dressings-none  CMS-L sided weakness, minimal dorsi/planter flexion on the L side, +pp, scds on and then off per pt request at hs.   Drain-none  Activity-up via wc to pivot and then to the bathroom, pivots with 2 assist,   Pain-denies and no pain meds given  D/C Plan-home with home pca

## 2018-02-13 NOTE — PLAN OF CARE
Problem: Patient Care Overview  Goal: Individualization & Mutuality  Outcome: Adequate for Discharge Date Met: 02/13/18  /100, Norvasc started.  Up with 2 Assist.  Voiding and incontinent.  Cdiff negative.  Contact precautions maintained for ESBL.  DC instructions given and patient verbalized understanding.  PCA will  patient at 1030.

## 2018-02-13 NOTE — PLAN OF CARE
Problem: Patient Care Overview  Goal: Plan of Care/Patient Progress Review  A/O, forgetful, slept well, VSS; elevated BP,  leftside weakness d/t CVA, up with Ax2,to pivot beside, denies pain, incontinentx3, linens has been changed, passing flatus, no BM this shift, plans to dc to home today.

## 2018-02-13 NOTE — PROGRESS NOTES
Clinic Care Coordination Contact    Situation: Patient chart reviewed by care coordinator.    Background: [facility] and was subsequently admitted to the floor.and DC back home today. CTS referral for follow up.    Assessment: Pt describes a situation that would be reportable to the [facility] as a Vulnerable Adult. A report to the [facility] was made by the ED SWr on duty 2/8.   CC CLAUDETTE will follow up by outreaching to the pt and communicating any on-going concerns to the [facility]  as needed.     Plan/Recommendations: MERON WILKINS will be involved.    Tennille Barnett [facility]  Clinic Care Coordinator-  Clinics: [place], [place], [place]  E-Mail: [email or web address]  Telephone: [phone]

## 2018-02-13 NOTE — PLAN OF CARE
Problem: Patient Care Overview  Goal: Plan of Care/Patient Progress Review  Occupational Therapy Discharge Summary    Reason for therapy discharge:    Discharged to home.    Progress towards therapy goal(s). See goals on Care Plan in [facility] electronic health record for goal details.  Goals not met.  Barriers to achieving goals:   discharge from facility.    Therapy recommendation(s):    recommendation from previous therapist for return home with PCA services

## 2018-02-13 NOTE — DISCHARGE SUMMARY
Admit Date:     02/08/2018   Discharge Date:     02/13/2018      PRIMARY CARE PROVIDER:  Dr. Doug Briones.      DISCHARGE DISPOSITION:  Home.      DISCHARGE CONDITION:  Stable.      PHYSICAL EXAMINATION:   GENERAL:  She is awake, alert, comfortable appearing, no signs of acute distress.   ABDOMEN:  Soft, nontender.      DISCHARGE DIAGNOSES:   1.  Sepsis due to acute pyelonephritis caused by Klebsiella.   2.  Klebsiella bacteremia, likely due to the urinary source.   3.  Acute kidney injury.   4.  Diabetes mellitus.   5.  Hypertension.   6.  Dysphagia.   7.  Prior history of cerebrovascular accident, meningioma.  The patient mentions she has upcoming follow up with neurosurgery as an outpatient.   8.  Other chronic medical issues include hypothyroidism, hyperlipidemia, restless legs syndrome, gastroesophageal reflux disease.      CONSULTATIONS:     1.   services.   2.  Urology.        IMAGING:  CT abdomen and pelvis.      PENDING LAB TESTS:  None.      HISTORY OF PRESENT ILLNESS:  Please refer to the H and P for full details.  Briefly, this is a 72-year-old female with a past history of diabetes, hypertension, prior history of stroke with left-sided hemiparesis, recurrent UTIs who came to the hospital with right flank pain.      HOSPITAL COURSE:  The patient was noted to have signs of urinary tract infection.  She was treated with antibiotics.  She was initially on IV ceftriaxone then narrowed to oral cefdinir.  Her blood and urine cultures grew Klebsiella.  There was concern from some stranding around the right kidney noted on the CT scan.  This might have been related to a recently passed stone.  The patient was seen by urology in consultation.  They did not feel there was a need for any surgical intervention at this time, especially given lack of any obstructing stone or ureteral stone.      Overall, the patient is doing much better at this time.  Her white count is trending down.  She is afebrile.   Mentation is back to her baseline.  She feels quite well and requesting discharge home and appears stable for that.     She had mild kidney injury associated with the sepsis and dehydration which is resolved now.  She has a prior history of stroke with left-side hemiparesis, was seen by speech therapy in the hospital for dysphagia.      Also please note the patient has a history of hypertension.  She used to be on amlodipine, but it appears that at some point it was discontinued.  She was noted to have elevated blood pressure in the hospital and the amlodipine has been resumed at the time of discharge.  I discussed with the patient and she is in agreement.      She is overall doing quite well and her other chronic issues appear to be stable.  She does have a history of a meningioma with prior resection and reports she has a followup scheduled later this month to see neurosurgery at an outside hospital for followup on that.  No acute issues during this hospital stay.        There were also initially some concern for vulnerable adult.  Please refer to the note by the  services for further details of that.  Patient tells me that she feels safe going home and comfortable with the support that she receives from her PCA.     DISCHARGE DIET:  Regular diet.      FOLLOWUP:  The primary provider in 1 week for hospital followup.      Total time spent in face-to-face contact with the patient and coordinating discharge was more than 30 minutes.      ALLERGIES:   1.  AMOXICILLIN.   2.  PENICILLINS.        She had some mild diarrhea in the hospital; she had a stool C. diff. test done that was negative.     Discharge Medication List as of 2/13/2018  9:08 AM      START taking these medications    Details   amLODIPine (NORVASC) 5 MG tablet Take 1 tablet (5 mg) by mouth daily, Disp-30 tablet, R-1, E-Prescribe      !! order for DME Equipment being ordered: Blood Pressure CuffDisp-1 Device, R-0, Local Print      cefdinir  (OMNICEF) 300 MG capsule Take 1 capsule (300 mg) by mouth 2 times daily for 10 days, Disp-20 capsule, R-0, E-Prescribe      Lactobacillus-Inulin ([facility]) CAPS Take 1 capsule by mouth 2 times daily for 10 days, Disp-20 capsule, R-0, E-Prescribe       !! - Potential duplicate medications found. Please discuss with provider.      CONTINUE these medications which have NOT CHANGED    Details   gabapentin (NEURONTIN) 600 MG tablet Take 1,200 mg by mouth 2 times daily, Historical      traZODone (DESYREL) 50 MG tablet Take  mg by mouth At Bedtime, Historical      levothyroxine (SYNTHROID/LEVOTHROID) 100 MCG tablet 1 tab daily., Disp-90 tablet, R-0, E-Prescribe      lisinopril (PRINIVIL/ZESTRIL) 20 MG tablet TAKE 1 TABLET(20 MG) BY MOUTH DAILY, Disp-90 tablet, R-3, E-Prescribe      metFORMIN (GLUCOPHAGE) 1000 MG tablet Take 1 tablet (1,000 mg) by mouth 2 times daily (with meals), Disp-180 tablet, R-0, E-Prescribe      atorvastatin (LIPITOR) 40 MG tablet Take 1 tablet (40 mg) by mouth daily, Disp-90 tablet, R-0, E-Prescribe      omeprazole (PRILOSEC OTC) 20 MG tablet Take 1 tablet (20 mg) by mouth 2 times daily (before meals), Disp-180 tablet, R-3, E-Prescribe      ROPINIROLE HYDROCHLORIDE 4 MG TABS TAKE 1 TABLET(4 MG) BY MOUTH AT BEDTIME, Disp-90 tablet, R-3, E-Prescribe      ranitidine (ZANTAC) 150 MG tablet Take 1 tablet (150 mg) by mouth 2 times daily, Disp-180 tablet, R-3, E-Prescribe      tacrolimus (PROTOPIC) 0.1 % ointment Apply topically 2 times daily (under right eye)Historical      naproxen (NAPROSYN) 500 MG tablet TAKE 1 TABLET(500 MG) BY MOUTH TWICE DAILY WITH MEALS AS NEEDED FOR PAIN, Disp-180 tablet, R-2, E-Prescribe**Patient requests 90 days supply**      !! order for DME Equipment being ordered: Diabetic shoesDisp-1 Device, R-1, Local Print1 device  = 1 pair       !! - Potential duplicate medications found. Please discuss with provider.        Results for orders placed or performed  during the hospital encounter of 02/08/18   CT Abdomen Pelvis w/o Contrast    Narrative    CT ABDOMEN AND PELVIS WITHOUT CONTRAST   2/8/2018 9:11 AM     HISTORY:  Patient awoke this morning with right flank pain radiating  to the right lower abdomen, associated with hematuria, urinary  pressure and vomiting. Rule out renal calculus.    TECHNIQUE:   No IV contrast material. Radiation dose for this scan was  reduced using automated exposure control, adjustment of the mA and/or  kV according to patient size, or iterative reconstruction technique.    COMPARISON: 7/5/2017    FINDINGS:    Abdomen: There is enlargement of the right kidney with mild adjacent  strand-like density and strand-like density in the renal sinus fat.  Multiple previously identified right renal cysts are unchanged. A tiny  stone is seen in the right lower pole renal calyx, unchanged. The  right ureter is slightly dilated, but no ureteral calculus is seen.  Only adjacent phleboliths and arterial calcifications are noted. The  left kidney appears normal.    Previous cholecystectomy. Sliding hiatal hernia. Normal appearing  liver, spleen, pancreas and adrenal glands. Slight extrahepatic  biliary dilatation, unchanged. No free air or free fluid. No  adenopathy.    Pelvis: Colonic diverticulosis. Large amount of stool in the rectum.  Small bowel is unremarkable. The appendix is not identified. Previous  hysterectomy and bilateral oophorectomy. Left total hip arthroplasty.  Lumbar degenerative changes.      Impression    IMPRESSION:  1. Right kidney is enlarged with adjacent strand-like density and  slight dilatation of the right ureter. Although there is a tiny stone  in the lower pole calyx of the right kidney, no ureteral calculus is  seen. Differential diagnosis includes recently passed stone with  residual hydronephrosis or infection of the kidney and ureter.  2. Sliding hiatal hernia.  3. Renal cysts.  4. Colonic diverticulosis.    MERY DUNCAN MD    XR Abdomen Port 1 View    Narrative    ABDOMEN ONE VIEW PORTABLE  2018 12:48 PM     HISTORY: Flank pain, renal stone history.     COMPARISON: Plain film 2016 and CT exams 2018.      Impression    IMPRESSION: No abnormal calcifications projecting over the upper  abdomen. The tiny nonobstructing calculus seen in the lower pole  collecting system of the right kidney on the CT study is probably too  small to see by plain film. There are several small rounded  calcifications scattered in the pelvis which were present on the prior  exam and are consistent with phleboliths. Normal bowel gas pattern.  Prior cholecystectomy.    NYLA PIMENTEL MD     *Note: Due to a large number of results and/or encounters for the requested time period, some results have not been displayed. A complete set of results can be found in Results Review.           DYANA QUIROGA MD             D: 2018   T: 2018   MT: [id number]      Name:     SHARRI PHILLIPS   MRN:      [id number]        Account:        [id number]   :      1945           Admit Date:     2018                                  Discharge Date: 2018      Document: [id number]

## 2018-02-14 DIAGNOSIS — E03.9 HYPOTHYROIDISM, UNSPECIFIED TYPE: ICD-10-CM

## 2018-02-14 RX ORDER — LEVOTHYROXINE SODIUM 100 UG/1
TABLET ORAL
Qty: 30 TABLET | Refills: 0 | Status: SHIPPED | OUTPATIENT
Start: 2018-02-14 | End: 2018-03-14 | Stop reason: DRUGHIGH

## 2018-02-14 NOTE — TELEPHONE ENCOUNTER
"Requested Prescriptions   Pending Prescriptions Disp Refills     levothyroxine (SYNTHROID/LEVOTHROID) 100 MCG tablet [Pharmacy Med Name: LEVOTHYROXINE 0.100MG (100MCG) TAB] 30 tablet 0     Sig: TAKE 1 TABLET BY MOUTH EVERY DAY. NEED FASTING LAB TEST IN THE NEXT 30 DAYS    Thyroid Protocol Failed    2/14/2018  3:29 AM       Failed - Normal TSH on file in past 12 months    Recent Labs   Lab Test  09/28/17   1052   TSH  7.04*             Passed - Patient is 12 years or older       Passed - Recent or future visit with authorizing provider's specialty    Patient had office visit in the last year or has a visit in the next 30 days with authorizing provider.  See \"Patient Info\" tab in inbasket, or \"Choose Columns\" in Meds & Orders section of the refill encounter.            Passed - No active pregnancy on record    If patient is pregnant or has had a positive pregnancy test, please check TSH.         Passed - No positive pregnancy test in past 12 months    If patient is pregnant or has had a positive pregnancy test, please check TSH.          Routing refill request to provider for review/approval because:  Labs out of range:  tsh        "

## 2018-02-22 ENCOUNTER — CARE COORDINATION (OUTPATIENT)
Dept: CARE COORDINATION | Facility: CLINIC | Age: 73
End: 2018-02-22
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
## 2018-02-23 ENCOUNTER — CARE COORDINATION (OUTPATIENT)
Dept: CARE COORDINATION | Facility: CLINIC | Age: 73
End: 2018-02-23
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
## 2018-03-07 PROBLEM — N12 PYELONEPHRITIS: Status: RESOLVED | Noted: 2018-02-08 | Resolved: 2018-03-07

## 2018-03-07 PROBLEM — N10 ACUTE PYELONEPHRITIS: Status: RESOLVED | Noted: 2018-02-08 | Resolved: 2018-03-07

## 2018-03-08 ENCOUNTER — OFFICE VISIT (OUTPATIENT)
Dept: INTERNAL MEDICINE | Facility: CLINIC | Age: 73
End: 2018-03-08
Payer: MEDICARE

## 2018-03-08 VITALS
OXYGEN SATURATION: 98 % | WEIGHT: 147 LBS | TEMPERATURE: 98 F | DIASTOLIC BLOOD PRESSURE: 80 MMHG | SYSTOLIC BLOOD PRESSURE: 130 MMHG | HEART RATE: 66 BPM | RESPIRATION RATE: 16 BRPM | BODY MASS INDEX: 25.23 KG/M2

## 2018-03-08 DIAGNOSIS — D32.9 MENINGIOMA (H): ICD-10-CM

## 2018-03-08 DIAGNOSIS — E78.5 HYPERLIPIDEMIA LDL GOAL <100: ICD-10-CM

## 2018-03-08 DIAGNOSIS — E03.9 HYPOTHYROIDISM, UNSPECIFIED TYPE: ICD-10-CM

## 2018-03-08 DIAGNOSIS — Z01.818 PREOP GENERAL PHYSICAL EXAM: Primary | ICD-10-CM

## 2018-03-08 DIAGNOSIS — E11.49 TYPE 2 DIABETES MELLITUS WITH OTHER NEUROLOGIC COMPLICATION, WITHOUT LONG-TERM CURRENT USE OF INSULIN (H): ICD-10-CM

## 2018-03-08 DIAGNOSIS — I10 ESSENTIAL HYPERTENSION: ICD-10-CM

## 2018-03-08 LAB
ALBUMIN SERPL-MCNC: 3.3 G/DL (ref 3.4–5)
ALP SERPL-CCNC: 97 U/L (ref 40–150)
ALT SERPL W P-5'-P-CCNC: 25 U/L (ref 0–50)
ANION GAP SERPL CALCULATED.3IONS-SCNC: 4 MMOL/L (ref 3–14)
AST SERPL W P-5'-P-CCNC: 25 U/L (ref 0–45)
BILIRUB SERPL-MCNC: 0.4 MG/DL (ref 0.2–1.3)
BUN SERPL-MCNC: 15 MG/DL (ref 7–30)
CALCIUM SERPL-MCNC: 8.9 MG/DL (ref 8.5–10.1)
CHLORIDE SERPL-SCNC: 113 MMOL/L (ref 94–109)
CO2 SERPL-SCNC: 27 MMOL/L (ref 20–32)
CREAT SERPL-MCNC: 0.89 MG/DL (ref 0.52–1.04)
CREAT UR-MCNC: 125 MG/DL
GFR SERPL CREATININE-BSD FRML MDRD: 62 ML/MIN/1.7M2
GLUCOSE SERPL-MCNC: 80 MG/DL (ref 70–99)
MICROALBUMIN UR-MCNC: 33 MG/L
MICROALBUMIN/CREAT UR: 26.72 MG/G CR (ref 0–25)
POTASSIUM SERPL-SCNC: 3.9 MMOL/L (ref 3.4–5.3)
PROT SERPL-MCNC: 7.2 G/DL (ref 6.8–8.8)
SODIUM SERPL-SCNC: 144 MMOL/L (ref 133–144)
T4 FREE SERPL-MCNC: 1.46 NG/DL (ref 0.76–1.46)
TSH SERPL DL<=0.005 MIU/L-ACNC: 0.03 MU/L (ref 0.4–4)

## 2018-03-08 PROCEDURE — 80053 COMPREHEN METABOLIC PANEL: CPT | Performed by: INTERNAL MEDICINE

## 2018-03-08 PROCEDURE — 84443 ASSAY THYROID STIM HORMONE: CPT | Performed by: INTERNAL MEDICINE

## 2018-03-08 PROCEDURE — 93000 ELECTROCARDIOGRAM COMPLETE: CPT | Performed by: INTERNAL MEDICINE

## 2018-03-08 PROCEDURE — 36415 COLL VENOUS BLD VENIPUNCTURE: CPT | Performed by: INTERNAL MEDICINE

## 2018-03-08 PROCEDURE — 82043 UR ALBUMIN QUANTITATIVE: CPT | Performed by: INTERNAL MEDICINE

## 2018-03-08 PROCEDURE — 99215 OFFICE O/P EST HI 40 MIN: CPT | Performed by: INTERNAL MEDICINE

## 2018-03-08 PROCEDURE — 84439 ASSAY OF FREE THYROXINE: CPT | Performed by: INTERNAL MEDICINE

## 2018-03-08 ASSESSMENT — PAIN SCALES - GENERAL: PAINLEVEL: NO PAIN (0)

## 2018-03-08 NOTE — MR AVS SNAPSHOT
After Visit Summary   3/8/2018    Susi Aguila    MRN: [id number]           Patient Information     Date Of Birth          1945        Visit Information        Provider Department      3/8/2018 9:00 AM Logan Briones [place] [facility]        Today's Diagnoses     Preop general physical exam    -  1    Meningioma (H)        Type 2 diabetes mellitus with other neurologic complication, without long-term current use of insulin (H)        Hypothyroidism, unspecified type        Essential hypertension        Hyperlipidemia LDL goal <100          Care Instructions      Before Your Surgery      Call your surgeon if there is any change in your health. This includes signs of a cold or flu (such as a sore throat, runny nose, cough, rash or fever).    Do not smoke, drink alcohol or take over the counter medicine (unless your surgeon or primary care doctor tells you to) for the 24 hours before and after surgery.    If you take prescribed drugs: Follow your doctor s orders about which medicines to take and which to stop until after surgery.    Eating and drinking prior to surgery: follow the instructions from your surgeon    Take a shower or bath the night before surgery. Use the soap your surgeon gave you to gently clean your skin. If you do not have soap from your surgeon, use your regular soap. Do not shave or scrub the surgery site.  Wear clean pajamas and have clean sheets on your bed.           Follow-ups after your visit        Who to contact     If you have questions or need follow up information about today's clinic visit or your schedule please contact [facility] directly at [phone].  Normal or non-critical lab and imaging results will be communicated to you by MyChart, letter or phone within 4 business days after the clinic has received the results. If you do not hear from us within 7 days, please contact the clinic through MyChart or phone. If  "you have a critical or abnormal lab result, we will notify you by phone as soon as possible.  Submit refill requests through [employer] or call your pharmacy and they will forward the refill request to us. Please allow 3 business days for your refill to be completed.          Additional Information About Your Visit        "[employer]hart Information     [employer] lets you send messages to your doctor, view your test results, renew your prescriptions, schedule appointments and more. To sign up, go to [email or web address] . Click on \"Log in\" on the left side of the screen, which will take you to the Welcome page. Then click on \"Sign up Now\" on the right side of the page.     You will be asked to enter the access code listed below, as well as some personal information. Please follow the directions to create your username and password.     Your access code is: [email or web address]  Expires: 2018 12:25 PM     Your access code will  in 90 days. If you need help or a new code, please call your [place] clinic or [phone].        Care EveryWhere ID     This is your Care EveryWhere ID. This could be used by other organizations to access your [place] medical records  [id number]        Your Vitals Were     Pulse Temperature Respirations Pulse Oximetry BMI (Body Mass Index)       66 98  F (36.7  C) (Oral) 16 98% 25.23 kg/m2        Blood Pressure from Last 3 Encounters:   18 130/80   18 (!) 167/101   18 130/78    Weight from Last 3 Encounters:   18 147 lb (66.7 kg)   18 147 lb (66.7 kg)   18 147 lb (66.7 kg)              We Performed the Following     Comprehensive metabolic panel     EKG 12-lead complete w/read - Clinics     Microalbumin quantitative random urine     T4 free     TSH with free T4 reflex          Today's Medication Changes          These changes are accurate as of 3/8/18 11:59 PM.  If you have any questions, ask your nurse or doctor.               These medicines have changed " or have updated prescriptions.        Dose/Directions    levothyroxine 88 MCG tablet   Commonly known as:  SYNTHROID/LEVOTHROID   This may have changed:  See the new instructions.   Used for:  Hypothyroidism, unspecified type   Changed by:  Logan Briones MD        Dose:  88 mcg   Take 1 tablet (88 mcg) by mouth daily   Quantity:  90 tablet   Refills:  3            Where to get your medicines      These medications were sent to [facility] [address] [place] [address] [place], [place] - [address] AT [facility]  [address], [place] [address]     Phone:  [phone]     amLODIPine 5 MG tablet    atorvastatin 40 MG tablet    levothyroxine 88 MCG tablet    metFORMIN 1000 MG tablet                Primary Care Provider Office Phone # Fax #    Logan Briones [phone]       [address] [facility] [address]        Equal Access to Services     [facility]: Hadii tyler clarke hadasho Soomaali, waaxda luqadaha, qaybta kaalmada adeegyada, lala robles haytre betts . So [facility] [place] [phone].    ATENCIÓN: Si habla español, tiene a cabezas disposición servicios gratuitos de asistencia lingüística. Llame al [phone].    We comply with applicable federal civil rights laws and [place] laws. We do not discriminate on the basis of race, color, national origin, age, disability, sex, sexual orientation, or gender identity.            Thank you!     Thank you for choosing [facility]  for your care. Our goal is always to provide you with excellent care. Hearing back from our patients is one way we can continue to improve our services. Please take a few minutes to complete the written survey that you may receive in the mail after your visit with us. Thank you!             Your Updated Medication List - Protect others around you: Learn how to safely use, store and throw away your medicines at www.disposemymeds.org.          This list is accurate as of 3/8/18 11:59 PM.  Always use your  most recent med list.                   Brand Name Dispense Instructions for use Diagnosis    amLODIPine 5 MG tablet    NORVASC    30 tablet    Take 1 tablet (5 mg) by mouth daily    Essential hypertension       atorvastatin 40 MG tablet    LIPITOR    90 tablet    Take 1 tablet (40 mg) by mouth daily    Hyperlipidemia LDL goal <100       gabapentin 600 MG tablet    NEURONTIN     Take 1,200 mg by mouth 2 times daily        levothyroxine 88 MCG tablet    SYNTHROID/LEVOTHROID    90 tablet    Take 1 tablet (88 mcg) by mouth daily    Hypothyroidism, unspecified type       lisinopril 20 MG tablet    PRINIVIL/ZESTRIL    90 tablet    TAKE 1 TABLET(20 MG) BY MOUTH DAILY    Essential hypertension       metFORMIN 1000 MG tablet    GLUCOPHAGE    180 tablet    Take 1 tablet (1,000 mg) by mouth 2 times daily (with meals)    Type 2 diabetes mellitus with other neurologic complication, without long-term current use of insulin (H)       naproxen 500 MG tablet    NAPROSYN    180 tablet    TAKE 1 TABLET(500 MG) BY MOUTH TWICE DAILY WITH MEALS AS NEEDED FOR PAIN    Sciatica, unspecified laterality       omeprazole 20 MG tablet    priLOSEC OTC    180 tablet    Take 1 tablet (20 mg) by mouth 2 times daily (before meals)    Gastroesophageal reflux disease, esophagitis presence not specified       * order for DME     1 Device    Equipment being ordered: Diabetic shoes    Neuropathy, Type 2 diabetes mellitus with other neurologic complication, without long-term current use of insulin (H)       * order for DME     1 Device    Equipment being ordered: Blood Pressure Cuff    Essential hypertension       ranitidine 150 MG tablet    ZANTAC    180 tablet    Take 1 tablet (150 mg) by mouth 2 times daily    Gastroesophageal reflux disease, esophagitis presence not specified       ROPINIROLE HYDROCHLORIDE 4 MG Tabs     90 tablet    TAKE 1 TABLET(4 MG) BY MOUTH AT BEDTIME    Restless leg       tacrolimus 0.1 % ointment    PROTOPIC     Apply topically  2 times daily (under right eye)        traZODone 50 MG tablet    DESYREL     Take  mg by mouth At Bedtime        * Notice:  This list has 2 medication(s) that are the same as other medications prescribed for you. Read the directions carefully, and ask your doctor or other care provider to review them with you.
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
## 2018-03-14 ENCOUNTER — TELEPHONE (OUTPATIENT)
Dept: INTERNAL MEDICINE | Facility: CLINIC | Age: 73
End: 2018-03-14

## 2018-03-14 RX ORDER — LEVOTHYROXINE SODIUM 88 UG/1
88 TABLET ORAL DAILY
Qty: 90 TABLET | Refills: 3 | Status: SHIPPED | OUTPATIENT
Start: 2018-03-14 | End: 2020-09-23

## 2018-03-14 RX ORDER — AMLODIPINE BESYLATE 5 MG/1
5 TABLET ORAL DAILY
Qty: 30 TABLET | Refills: 1 | Status: SHIPPED | OUTPATIENT
Start: 2018-03-14 | End: 2018-03-15

## 2018-03-14 RX ORDER — ATORVASTATIN CALCIUM 40 MG/1
40 TABLET, FILM COATED ORAL DAILY
Qty: 90 TABLET | Refills: 3 | Status: SHIPPED | OUTPATIENT
Start: 2018-03-14 | End: 2019-05-13

## 2018-03-15 ENCOUNTER — TELEPHONE (OUTPATIENT)
Dept: INTERNAL MEDICINE | Facility: CLINIC | Age: 73
End: 2018-03-15

## 2018-03-15 DIAGNOSIS — I10 ESSENTIAL HYPERTENSION: ICD-10-CM

## 2018-03-15 RX ORDER — AMLODIPINE BESYLATE 5 MG/1
TABLET ORAL
Qty: 90 TABLET | Refills: 1 | Status: SHIPPED | OUTPATIENT
Start: 2018-03-15 | End: 2018-05-09

## 2018-03-15 NOTE — TELEPHONE ENCOUNTER
Left message for PCA, Ruben, to call back regarding patient's labs       Please call pt's PCA Ruben to give these instructions as he sets up pt's meds    Stop Levothyroxine 100mcg tabs. Dose too high   Pt to start levothyroxine 88mcg tab, 1 tab daily instead for thyroid. Rx sent to [facility] pharmacy   Pt to have a nonfasting TSH lab in early May as ordered   Nothing to eat/drink after midnight before pt's upcoming surgery except pt to take Amlodipine, Gabapentin and Levothyroxine on the morning of surgery

## 2018-03-19 ENCOUNTER — TRANSFERRED RECORDS (OUTPATIENT)
Dept: HEALTH INFORMATION MANAGEMENT | Facility: CLINIC | Age: 73
End: 2018-03-19

## 2018-03-22 ENCOUNTER — TRANSFERRED RECORDS (OUTPATIENT)
Dept: HEALTH INFORMATION MANAGEMENT | Facility: CLINIC | Age: 73
End: 2018-03-22

## 2018-04-09 ENCOUNTER — TELEPHONE (OUTPATIENT)
Dept: NURSING | Facility: CLINIC | Age: 73
End: 2018-04-09

## 2018-04-09 NOTE — TELEPHONE ENCOUNTER
Caren PT with [facility] requesting orders.    PT 2X/week for 4 weeks for gait and transfer training as well as home exercise strengthening.      Approved per standing orders.

## 2018-04-10 ENCOUNTER — TELEPHONE (OUTPATIENT)
Dept: INTERNAL MEDICINE | Facility: CLINIC | Age: 73
End: 2018-04-10

## 2018-04-10 NOTE — TELEPHONE ENCOUNTER
Pt s f/u appt with 4/12/18 to review status after hospital discharge. Will discuss meds further with pt at appt. In the meantime, if pt has Prilosec, then take Prilosec 1 capsule twice a day rather than Protonix until seen. Pt may get ASA OTC and should not be taking Naproxen when on ASA. WIll discuss Sertraline when seen

## 2018-04-10 NOTE — TELEPHONE ENCOUNTER
[facility] calling in regards to pt medication.  Pt recently discharged from [facility].    Zantac, prilosec and naproxen were discontinued when she left the hospital but patient has been taking these( on current med list)      Pt was discharged on Sertraline 25 mg daily, Protonix 20 mg BID and ASA 325mg but does not have these medications in the home.  Would need prescriptions sent to pharmacy if needs to take.  Patient did tell home care nurse that she does not want to take the sertraline.  Does not feel that she needs at this time is not depressed.    Please advise on which meds she should be taking.

## 2018-04-12 ENCOUNTER — TELEPHONE (OUTPATIENT)
Dept: NURSING | Facility: CLINIC | Age: 73
End: 2018-04-12

## 2018-04-12 NOTE — TELEPHONE ENCOUNTER
Verna OT with [facility] requesting orders.    OT 2X next week.      Approved per standing orders.

## 2018-04-19 ENCOUNTER — MEDICAL CORRESPONDENCE (OUTPATIENT)
Dept: HEALTH INFORMATION MANAGEMENT | Facility: CLINIC | Age: 73
End: 2018-04-19

## 2018-05-01 ENCOUNTER — OFFICE VISIT (OUTPATIENT)
Dept: INTERNAL MEDICINE | Facility: CLINIC | Age: 73
End: 2018-05-01
Payer: MEDICARE

## 2018-05-01 VITALS
SYSTOLIC BLOOD PRESSURE: 128 MMHG | HEART RATE: 66 BPM | TEMPERATURE: 98.6 F | BODY MASS INDEX: 25.27 KG/M2 | RESPIRATION RATE: 16 BRPM | HEIGHT: 64 IN | DIASTOLIC BLOOD PRESSURE: 78 MMHG | WEIGHT: 148 LBS

## 2018-05-01 DIAGNOSIS — Z86.018 S/P RESECTION OF MENINGIOMA: Primary | ICD-10-CM

## 2018-05-01 DIAGNOSIS — D64.9 ANEMIA, UNSPECIFIED TYPE: ICD-10-CM

## 2018-05-01 DIAGNOSIS — E11.49 TYPE 2 DIABETES MELLITUS WITH OTHER NEUROLOGIC COMPLICATION, WITHOUT LONG-TERM CURRENT USE OF INSULIN (H): ICD-10-CM

## 2018-05-01 DIAGNOSIS — E03.9 HYPOTHYROIDISM, UNSPECIFIED TYPE: ICD-10-CM

## 2018-05-01 DIAGNOSIS — Z98.890 S/P RESECTION OF MENINGIOMA: Primary | ICD-10-CM

## 2018-05-01 DIAGNOSIS — N17.9 ACUTE RENAL FAILURE, UNSPECIFIED ACUTE RENAL FAILURE TYPE (H): ICD-10-CM

## 2018-05-01 LAB
ANION GAP SERPL CALCULATED.3IONS-SCNC: 8 MMOL/L (ref 3–14)
BUN SERPL-MCNC: 13 MG/DL (ref 7–30)
CALCIUM SERPL-MCNC: 9.2 MG/DL (ref 8.5–10.1)
CHLORIDE SERPL-SCNC: 111 MMOL/L (ref 94–109)
CO2 SERPL-SCNC: 25 MMOL/L (ref 20–32)
CREAT SERPL-MCNC: 0.86 MG/DL (ref 0.52–1.04)
ERYTHROCYTE [DISTWIDTH] IN BLOOD BY AUTOMATED COUNT: 14.9 % (ref 10–15)
GFR SERPL CREATININE-BSD FRML MDRD: 65 ML/MIN/1.7M2
GLUCOSE SERPL-MCNC: 85 MG/DL (ref 70–99)
HCT VFR BLD AUTO: 33.8 % (ref 35–47)
HGB BLD-MCNC: 10.2 G/DL (ref 11.7–15.7)
IRON SATN MFR SERPL: 10 % (ref 15–46)
IRON SERPL-MCNC: 34 UG/DL (ref 35–180)
MCH RBC QN AUTO: 27.1 PG (ref 26.5–33)
MCHC RBC AUTO-ENTMCNC: 30.2 G/DL (ref 31.5–36.5)
MCV RBC AUTO: 90 FL (ref 78–100)
PLATELET # BLD AUTO: 301 10E9/L (ref 150–450)
POTASSIUM SERPL-SCNC: 4.2 MMOL/L (ref 3.4–5.3)
RBC # BLD AUTO: 3.77 10E12/L (ref 3.8–5.2)
SODIUM SERPL-SCNC: 144 MMOL/L (ref 133–144)
TIBC SERPL-MCNC: 352 UG/DL (ref 240–430)
TSH SERPL DL<=0.005 MIU/L-ACNC: 0.97 MU/L (ref 0.4–4)
WBC # BLD AUTO: 7 10E9/L (ref 4–11)

## 2018-05-01 PROCEDURE — 83540 ASSAY OF IRON: CPT | Performed by: INTERNAL MEDICINE

## 2018-05-01 PROCEDURE — 85027 COMPLETE CBC AUTOMATED: CPT | Performed by: INTERNAL MEDICINE

## 2018-05-01 PROCEDURE — 84443 ASSAY THYROID STIM HORMONE: CPT | Performed by: INTERNAL MEDICINE

## 2018-05-01 PROCEDURE — 36415 COLL VENOUS BLD VENIPUNCTURE: CPT | Performed by: INTERNAL MEDICINE

## 2018-05-01 PROCEDURE — 83550 IRON BINDING TEST: CPT | Performed by: INTERNAL MEDICINE

## 2018-05-01 PROCEDURE — 80048 BASIC METABOLIC PNL TOTAL CA: CPT | Performed by: INTERNAL MEDICINE

## 2018-05-01 PROCEDURE — 99214 OFFICE O/P EST MOD 30 MIN: CPT | Performed by: INTERNAL MEDICINE

## 2018-05-01 ASSESSMENT — PAIN SCALES - GENERAL: PAINLEVEL: NO PAIN (0)

## 2018-05-01 NOTE — PATIENT INSTRUCTIONS
Labs as ordered nonfasting   Fasting lab test in the next 2-3 weeks. For fasting labs, please refrain from eating for 8 hours or more.  Be sure to  drink water and take your  medications the day of the test.  Review all meds being taken currently at home, doses of the medications and call the list to the nurseline [phone]  Continue therapies and follow-up with surgeons

## 2018-05-01 NOTE — PROGRESS NOTES
SUBJECTIVE:   Susi Aguila is a 72 year old female who presents to clinic today for the following health issues:    Chief Complaint   Patient presents with     Follow Up For     Brain Tumor     HOSPITAL DISCHARGE SUMMARY     Patient Name: Susi Aguila  YOB: 1945 Age: 72 y.o.  Medical Record Number: [id number]  Primary Physician: MERY ANN  Phone: [phone]  Admission Date: 3/22/2018  Discharge Date: 4/9/2018   Hospital stay 3/19/18 - 3/22/18    Susi Aguila was discharged from [facility] at [facility] to home with visits from home care RN, Physical Therapy, Occupational Therapy, Speech Therapy, Home Health Aide and home  services.    PRINCIPAL DIAGNOSIS CAUSING ADMISSION: S/P Resection of Meningioma    Principal Problem:  S/P resection of meningioma  Active Problems:  Meningioma, multiple (HC)  DM2 (diabetes mellitus, type 2) (HC)  HTN (hypertension)  Restless leg syndrome  Stroke (HC)  Hypothyroidism  Controlled type 2 diabetes mellitus without complication, without long-term current use of insulin ()    After Discharge Orders and Instructions   After Hospital Follow Up Appointment(s)   Follow up in one month with Dr. Mery Ritter/ Leida Grossman, FREDERIC    [facility]  [facility] to [facility])  [address] [place] [address], [place] [address]   [phone]  [phone]   When to follow up: 4 to 6 weeks   After Hospital Follow Up Appointment(s)   You have chosen to receive home care services from [facility]. A home care representative will be calling within one day of discharge to arrange to begin homecare services. If you have questions about your home care services, please call [phone]. Fax number is [phone].   When to follow up: 1 to 5 days   After Hospital Follow Up Appointment(s)   An appointment has been made for you for follow up and to  assess for Botox injections on 05/15/18 at 9:15AM with Dr. Evan Navarro [facility] (across the street from main entrance to [facility])  [address]  [place] [address] [phone]  Please call this number if you need to change or cancel this appointment.    Rehab physicians will see you to monitor your progress with home or outpatient therapy, and for issues that might keep you from achieving your highest level of independence.     BRIEF PRE-REHAB HOSPITAL COURSE:   Susi Aguila is a 72 y.o. female with a past medical history of meningioma, intracranial aneurysms, CVA with residual left spastic hemiparesis and history of falls with left hip fracture s/p surgery who presented to the acute care setting on 3/19/2018 due to a large frontal tumor and underwent a right sided subtotal resection of the frontal tumor and cranial reconstruction on 3/19/18.  Pathology was consistent with meningioma WHO stage I. Physical Therapy and Occupational Therapy evaluations were done and patient was noted to be below baseline with functional cares per Occupational Therapy note.  Physical Therapy also noted patient has LLE spasticity, clonus, plantarflexion contracture and right trunk lean that is interfering with function. Patient states after her prior CVA, she had a prolonged rehabilitation in [facility] and was eventually discharged to home.  She ambulated with a hemiwalker, but had a fall at home and sustained a hip fracture. Since then, patient had fear of ambulation due to fear of falling.  She would like to be able to ambulate with a hemiwalker at home. Patient had been seen by physical therapy, occupational therapy, and had been participating well and was making good progress. She was felt to be a good candidate for acute inpatient rehab at [facility]. Once the patient was deemed medically stable, patient was then admitted to [facility] for functional upgrading and medical management.     REHABILITATION COURSE:   The patient participated in a multidisciplinary comprehensive program including physical therapy, occupational therapy, psychology, rehabilitation nursing, social work, and therapeutic recreation. At the time of discharge, the patient met the goals necessary for discharge. See summary table of functional status below.      Functional Status At Admission 3/22/2018 Functional Status At Discharge 4/9/2018   Bed Rolling Bed Roll: maximum assistance Mod. Independent   Supine to Sit Maximal Assistance Standby assist   Sit to Stand Minimal Assistance Contact guard assist   Transfer Pivot Method Transfers: moderate assistance Pivot Transfers Status: moderate assistance Minimal assist         Ambulation Wheelchair Mobility Gait Distance   Minimal Assistance   8 feet x 1, 12 feet x 1 and 15 feet x 1 with heather-walker, custom solid ankle AFO and shoes on and contact guard assistance   Feeding Eating: set up Independent   Grooming Minimal Assistance: Patient performs greater than or equal to 75% of tasks (seated in wheelchair at sink ) Mod. Independent   Upper Body Dressing Upper body dressing:moderate assistance  Moderate assistance   Lower Body Dressing Maximal Assistance:  Patient performs 25-49% of tasks;Total Assistance  Patient performs less than 25% of tasks (don/doff mesh underwear ) Moderate assistance   Toileting Moderate Assistance:  Patient performs 50-75% of tasks Total assistance   Cognitive Cognitive Impairment - Current Status: (not recorded) Total Score MOCA: (not recorded) MOCA Level of Impairment: (not recorded) Moderate cognitive linguistic impairments, most notable for decreased attention, memory, and executive functioning.   Dysphagia Recommended Diet: regular Regular diet with thin liquids, no straws   Communication no deficits No deficits      MEDICAL ISSUES:   Principal Problem: S/P resection of  "meningioma  - SBP  goal per neurosurgery  - BP stable on lisinopril 20 daily and amlodipine twice daily   - Vitamin D level 11.8.  Started ergocalciferol 50,000 units supplementation twice a week.   - She has completed her dilantin and decadron tapers.   - Patient will have her staples removed during outpatient appointment with her surgeon.     Spasticity  - Trial of oral baclofen was done but patient was unable to tolerate due to lethargy.  - Now on low dose tizanidine BID. Tolerating well. Spasticity better controlled for therapies.  - Recommend follow up with PM&R after discharge for evaluation for Botox injection.    Fall 3/28/18   - Patient states she landed on the bed. Denied head trauma.  - No injury noted.      Hypothyroidism  - Recent dose change down to 88 mcg daily Synthroid.   - TSH in 6 weeks per S (5/11/2018)    Acute renal failure 4/5/2018  - 4/5/2018 Creatinine 1.53. Given IVF. Repeat creatinine was improved on 4/6 to normal Renal injury thought due to lack of oral fulids.         Pt's past medical history, family history, habits, medications and allergies were reviewed with the patient today.  See snap shot for  HCM status. Most recent lab results reviewed with pt. Problem list and histories reviewed & adjusted, as indicated.  Additional history as below:    Notes in care everywhere regarding the patient's hospitalization and rehab reviewed.  Part of the note as above.  Patient feels her surgery went well.  Denies headache symptoms currently.  No vision changes.  Neurologic status at prior to surgery baseline per patient.  Patient is accompanied by her friend who acts as a PCA.  Patient has been setting up her medications however rather than the friend and she is not able to tell me of medication changes and is not sure of what specific medications she is taking.  Patient states he is taking \"what ever is in the bottles\".  Discharge summary note indicates the reduction in Synthroid.  Patient " "cannot tell me however if she is taking that dose or a previous dose.  Denies chest pain, shortness of breath, abdominal pain.  Has not been checking blood sugars recently.  Appetite mildly reduced but improving.  No nausea or vomiting.  Bowel movements normal.  Muscle spasticity well-controlled with current use of tizanidine    Additional ROS:   Constitutional, HEENT, Cardiovascular, Pulmonary, GI and , Neuro, MSK and Psych review of systems/symptoms are otherwise negative or unchanged from previous, except as noted above.      OBJECTIVE:  /78  Pulse 66  Temp 98.6  F (37  C) (Oral)  Resp 16  Ht 5' 4\" (1.626 m)  Wt 148 lb (67.1 kg)  BMI 25.4 kg/m2   Estimated body mass index is 25.4 kg/(m^2) as calculated from the following:    Height as of this encounter: 5' 4\" (1.626 m).    Weight as of this encounter: 148 lb (67.1 kg).  Eye: PERRL, EOMI  HENT: ear canals and TM's normal and nose and mouth without ulcers or lesions. Surgical scar left forehead. Previous bony prominence no absent  Neck: no adenopathy. Thyroid normal to palpation. No bruits  Pulm: Lungs clear to auscultation   CV: Regular rates and rhythm  GI: Soft, nontender, Normal active bowel sounds, No hepatosplenomegaly or masses palpable  Ext: Peripheral pulses intact. No edema.   Neuro: Normal strength  RUE and RLE. LLE/LUE hemiparesis. 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 Sitting in WC. Gait not assessed    Assessment/Plan: (See plan discussion below for further details)  1. S/P resection of meningioma  Stable.  Management per neurosurgery    2. Acute renal failure, unspecified acute renal failure type (H)  See discharge note above.  GFR reportedly improving following IV fluids with the hospitalization.  Needs lab recheck  - Basic metabolic panel    3. Type 2 diabetes mellitus with other neurologic complication, without long-term current use of insulin (H)  Previously well-controlled with A1c 6.4 in February.  Not checking blood " sugars currently.  Lab was ordered  - Basic metabolic panel    4. Hypothyroidism, unspecified type  Due for thyroid lab recheck with levothyroxine dose reduction to 88 mcg per recent hospital discharge note  - TSH with free T4 reflex    5. Anemia, unspecified type  - CBC with platelets  - Iron and iron binding capacity      Plan discussion:  Labs as ordered nonfasting   Fasting lab test in the next 2-3 weeks. For fasting labs, please refrain from eating for 8 hours or more.  Be sure to  drink water and take your  medications the day of the test.  Review all meds being taken currently at home, doses of the medications and call the list to the nurseline [phone]  Continue therapies and follow-up with surgeons         Logan Briones MD  Internal Medicine Department  [facility]

## 2018-05-01 NOTE — MR AVS SNAPSHOT
"              After Visit Summary   5/1/2018    Susi Aguila    MRN: [id number]           Patient Information     Date Of Birth          1945        Visit Information        Provider Department      5/1/2018 10:30 AM Logan Briones MD [facility]        Today's Diagnoses     Anemia, unspecified type    -  1    Type 2 diabetes mellitus with other neurologic complication, without long-term current use of insulin (H)        Hypothyroidism, unspecified type          Care Instructions    Labs as ordered nonfasting   Fasting lab test in the next 2-3 weeks. For fasting labs, please refrain from eating for 8 hours or more.  Be sure to  drink water and take your  medications the day of the test.  Review all meds being taken currently at home, doses of the medications and call the list to the nurseline [phone]  Continue therapies and follow-up with surgeons            Follow-ups after your visit        Who to contact     If you have questions or need follow up information about today's clinic visit or your schedule please contact [facility] directly at [phone].  Normal or non-critical lab and imaging results will be communicated to you by [employer], letter or phone within 4 business days after the clinic has received the results. If you do not hear from us within 7 days, please contact the clinic through [employer] or phone. If you have a critical or abnormal lab result, we will notify you by phone as soon as possible.  Submit refill requests through [employer] or call your pharmacy and they will forward the refill request to us. Please allow 3 business days for your refill to be completed.          Additional Information About Your Visit        MyChart Information     [employer] lets you send messages to your doctor, view your test results, renew your prescriptions, schedule appointments and more. To sign up, go to [email or web address] . Click on \"Log in\" on the " "left side of the screen, which will take you to the Welcome page. Then click on \"Sign up Now\" on the right side of the page.     You will be asked to enter the access code listed below, as well as some personal information. Please follow the directions to create your username and password.     Your access code is: [email or web address]  Expires: 2018 11:10 AM     Your access code will  in 90 days. If you need help or a new code, please call your [place] clinic or [phone].        Care EveryWhere ID     This is your Care EveryWhere ID. This could be used by other organizations to access your [place] medical records  [id number]        Your Vitals Were     Pulse Temperature Respirations Height BMI (Body Mass Index)       66 98.6  F (37  C) (Oral) 16 5' 4\" (1.626 m) 25.4 kg/m2        Blood Pressure from Last 3 Encounters:   18 128/78   18 130/80   18 (!) 167/101    Weight from Last 3 Encounters:   18 148 lb (67.1 kg)   18 147 lb (66.7 kg)   18 147 lb (66.7 kg)              We Performed the Following     Basic metabolic panel     CBC with platelets     Iron and iron binding capacity     TSH with free T4 reflex        Primary Care Provider Office Phone # Fax #    Logan Briones [phone]       [address] [facility] [address]        Equal Access to Services     [facility] : Hadii aad ku hadasho Soomaali, waaxda luqadaha, qaybta kaalmada adeegyada, lala betts . So [facility] [phone].    ATENCIÓN: Si habla español, tiene a cabezas disposición servicios gratuitos de asistencia lingüística. Llame al [phone].    We comply with applicable federal civil rights laws and [place] laws. We do not discriminate on the basis of race, color, national origin, age, disability, sex, sexual orientation, or gender identity.            Thank you!     Thank you for choosing [facility]  for your care. Our goal is always to " provide you with excellent care. Hearing back from our patients is one way we can continue to improve our services. Please take a few minutes to complete the written survey that you may receive in the mail after your visit with us. Thank you!             Your Updated Medication List - Protect others around you: Learn how to safely use, store and throw away your medicines at www.disposemymeds.org.          This list is accurate as of 5/1/18 11:10 AM.  Always use your most recent med list.                   Brand Name Dispense Instructions for use Diagnosis    amLODIPine 5 MG tablet    NORVASC    90 tablet    TAKE 1 TABLET BY MOUTH EVERY DAY.    Essential hypertension       atorvastatin 40 MG tablet    LIPITOR    90 tablet    Take 1 tablet (40 mg) by mouth daily    Hyperlipidemia LDL goal <100       gabapentin 600 MG tablet    NEURONTIN     Take 1,200 mg by mouth 2 times daily        levothyroxine 88 MCG tablet    SYNTHROID/LEVOTHROID    90 tablet    Take 1 tablet (88 mcg) by mouth daily    Hypothyroidism, unspecified type       lisinopril 20 MG tablet    PRINIVIL/ZESTRIL    90 tablet    TAKE 1 TABLET(20 MG) BY MOUTH DAILY    Essential hypertension       metFORMIN 1000 MG tablet    GLUCOPHAGE    180 tablet    Take 1 tablet (1,000 mg) by mouth 2 times daily (with meals)    Type 2 diabetes mellitus with other neurologic complication, without long-term current use of insulin (H)       naproxen 500 MG tablet    NAPROSYN    180 tablet    TAKE 1 TABLET(500 MG) BY MOUTH TWICE DAILY WITH MEALS AS NEEDED FOR PAIN    Sciatica, unspecified laterality       omeprazole 20 MG tablet    priLOSEC OTC    180 tablet    Take 1 tablet (20 mg) by mouth 2 times daily (before meals)    Gastroesophageal reflux disease, esophagitis presence not specified       * order for DME     1 Device    Equipment being ordered: Diabetic shoes    Neuropathy, Type 2 diabetes mellitus with other neurologic complication, without long-term current use of  insulin (H)       * order for DME     1 Device    Equipment being ordered: Blood Pressure Cuff    Essential hypertension       ranitidine 150 MG tablet    ZANTAC    180 tablet    Take 1 tablet (150 mg) by mouth 2 times daily    Gastroesophageal reflux disease, esophagitis presence not specified       ROPINIROLE HYDROCHLORIDE 4 MG Tabs     90 tablet    TAKE 1 TABLET(4 MG) BY MOUTH AT BEDTIME    Restless leg       tacrolimus 0.1 % ointment    PROTOPIC     Apply topically 2 times daily (under right eye)        traZODone 50 MG tablet    DESYREL     Take  mg by mouth At Bedtime        * Notice:  This list has 2 medication(s) that are the same as other medications prescribed for you. Read the directions carefully, and ask your doctor or other care provider to review them with you.

## 2018-05-02 ENCOUNTER — TELEPHONE (OUTPATIENT)
Dept: NURSING | Facility: CLINIC | Age: 73
End: 2018-05-02

## 2018-05-02 DIAGNOSIS — G62.9 NEUROPATHY: ICD-10-CM

## 2018-05-02 DIAGNOSIS — D50.9 IRON DEFICIENCY ANEMIA, UNSPECIFIED IRON DEFICIENCY ANEMIA TYPE: Primary | ICD-10-CM

## 2018-05-02 DIAGNOSIS — E55.9 VITAMIN D DEFICIENCY: ICD-10-CM

## 2018-05-02 DIAGNOSIS — I10 ESSENTIAL HYPERTENSION: ICD-10-CM

## 2018-05-02 NOTE — TELEPHONE ENCOUNTER
Patient calling to update and review medications.      Currently Taking:       Metformin 1000 mg BID with meals  Lipitor 40 mg daily  Amlodipine 5 mg BID  Gabapentin 600 mg 1-1.5 tablets TID  Ranitidine 150 mg BID   Lisinopril 20 mg daily every morning   Levothyroxine 88 mcg 1 tablet every morning   Omeprazole 20 mg BID before meals  Trazodone 50 mg 1-2 tablets QHS PRN   Protopic 0.1 % ointment BID (under right eye)  Naproxen 500 mg BID PRN  Ropinirole 4 mg QHS        Medications she has but has not starting taking yet:    Vitamin D2 50,000 units 1 tablet on Wednesdays and Saturdays   mg daily with meals  Tizanidine 25 mg BID

## 2018-05-04 ENCOUNTER — TRANSFERRED RECORDS (OUTPATIENT)
Dept: HEALTH INFORMATION MANAGEMENT | Facility: CLINIC | Age: 73
End: 2018-05-04

## 2018-05-09 PROBLEM — D50.9 IRON DEFICIENCY ANEMIA, UNSPECIFIED IRON DEFICIENCY ANEMIA TYPE: Status: ACTIVE | Noted: 2018-05-09

## 2018-05-09 RX ORDER — AMLODIPINE BESYLATE 5 MG/1
TABLET ORAL
Qty: 180 TABLET | Refills: 3 | Status: SHIPPED | OUTPATIENT
Start: 2018-05-09 | End: 2018-08-09

## 2018-05-09 RX ORDER — GABAPENTIN 600 MG/1
TABLET ORAL
Qty: 405 TABLET | Refills: 3 | Status: SHIPPED | OUTPATIENT
Start: 2018-05-09 | End: 2018-08-09

## 2018-05-09 RX ORDER — ERGOCALCIFEROL 1.25 MG/1
CAPSULE, LIQUID FILLED ORAL
Qty: 24 CAPSULE | Refills: 3 | Status: SHIPPED | OUTPATIENT
Start: 2018-05-09 | End: 2018-08-09

## 2018-05-09 NOTE — TELEPHONE ENCOUNTER
Would reduce ASA to 81mg daily and start taking   Start taking Vit D as below. Rx sent to pharmacy   Use of Tizanadine (muscle relaxant) per neurosurgery  Med list updated to reflect other meds currently taking  Recent labs show thyroid function now normal with current Levothyroxine dose so continue other current meds  Labs also show pt anemic after surgery and iron levels low.  Pt to take iron supplement (Vitron-C), 1 tab daily for 1 month. Rx faxed to her pharmacy  Pt to repeat fasting lab in 1 month to recheck anemia issue and also f/u on cholesterol and diabetes status. Labs ordered. Pt to make lab appt  Discuss above both with pt and her friend Ruben who sets up her meds and helps pt get to appts, etc since pt has some memory issues

## 2018-05-10 NOTE — TELEPHONE ENCOUNTER
Called and left voicemail on Ruben from [facility] requesting a call back to clinic to inform him of message below.  Chelsey Wyatt CMA on 5/10/2018 at 9:39 AM

## 2018-05-24 ENCOUNTER — TELEPHONE (OUTPATIENT)
Dept: PHARMACY | Facility: CLINIC | Age: 73
End: 2018-05-24

## 2018-06-06 DIAGNOSIS — E55.9 HYPOVITAMINOSIS D: Primary | ICD-10-CM

## 2018-06-13 ENCOUNTER — TELEPHONE (OUTPATIENT)
Dept: PHARMACY | Facility: CLINIC | Age: 73
End: 2018-06-13

## 2018-06-13 NOTE — TELEPHONE ENCOUNTER
Called Nadege to see if she was coming in today for scheduled MTM visit. She let me know that she is unable to make it and tried to cancel by leaving a message with someone. She is unsure which number she called. She does not have her calendar available and will call back to re-schedule.     Offered main MTM line [phone].

## 2018-06-15 DIAGNOSIS — D50.9 IRON DEFICIENCY ANEMIA, UNSPECIFIED IRON DEFICIENCY ANEMIA TYPE: ICD-10-CM

## 2018-06-15 DIAGNOSIS — E55.9 HYPOVITAMINOSIS D: ICD-10-CM

## 2018-06-15 DIAGNOSIS — E11.49 TYPE 2 DIABETES MELLITUS WITH OTHER NEUROLOGIC COMPLICATION, WITHOUT LONG-TERM CURRENT USE OF INSULIN (H): ICD-10-CM

## 2018-06-15 LAB
CHOLEST SERPL-MCNC: 168 MG/DL
DEPRECATED CALCIDIOL+CALCIFEROL SERPL-MC: 23 UG/L (ref 20–75)
HBA1C MFR BLD: 6.6 % (ref 0–5.6)
HDLC SERPL-MCNC: 67 MG/DL
HGB BLD-MCNC: 9.5 G/DL (ref 11.7–15.7)
IRON SATN MFR SERPL: 6 % (ref 15–46)
IRON SERPL-MCNC: 25 UG/DL (ref 35–180)
LDLC SERPL CALC-MCNC: 81 MG/DL
NONHDLC SERPL-MCNC: 101 MG/DL
TIBC SERPL-MCNC: 413 UG/DL (ref 240–430)
TRIGL SERPL-MCNC: 99 MG/DL

## 2018-06-15 PROCEDURE — 83540 ASSAY OF IRON: CPT | Performed by: INTERNAL MEDICINE

## 2018-06-15 PROCEDURE — 82306 VITAMIN D 25 HYDROXY: CPT | Performed by: INTERNAL MEDICINE

## 2018-06-15 PROCEDURE — 36415 COLL VENOUS BLD VENIPUNCTURE: CPT | Performed by: INTERNAL MEDICINE

## 2018-06-15 PROCEDURE — 85018 HEMOGLOBIN: CPT | Performed by: INTERNAL MEDICINE

## 2018-06-15 PROCEDURE — 83036 HEMOGLOBIN GLYCOSYLATED A1C: CPT | Performed by: INTERNAL MEDICINE

## 2018-06-15 PROCEDURE — 80061 LIPID PANEL: CPT | Performed by: INTERNAL MEDICINE

## 2018-06-15 PROCEDURE — 83550 IRON BINDING TEST: CPT | Performed by: INTERNAL MEDICINE

## 2018-06-16 ENCOUNTER — HEALTH MAINTENANCE LETTER (OUTPATIENT)
Age: 73
End: 2018-06-16

## 2018-06-21 DIAGNOSIS — M62.838 MUSCLE SPASM: ICD-10-CM

## 2018-06-21 DIAGNOSIS — E55.9 VITAMIN D DEFICIENCY: Primary | ICD-10-CM

## 2018-06-21 RX ORDER — TIZANIDINE 2 MG/1
2 TABLET ORAL 3 TIMES DAILY
Qty: 90 TABLET | Refills: 0 | Status: SHIPPED | OUTPATIENT
Start: 2018-06-21 | End: 2018-08-03

## 2018-06-21 RX ORDER — ERGOCALCIFEROL 1.25 MG/1
50000 CAPSULE, LIQUID FILLED ORAL
Qty: 8 CAPSULE | Refills: 0 | Status: SHIPPED | OUTPATIENT
Start: 2018-06-21 | End: 2018-08-09

## 2018-06-21 NOTE — TELEPHONE ENCOUNTER
Tizanidine AND VitD2      Last Written Prescription Date:  NA  Last Fill Quantity: NA,   # refills: NA  Last Office Visit: 5/1/18  Future Office visit:    Next 5 appointments (look out 90 days)     Jul 09, 2018 11:00 AM CDT   [facility] with Jayme Garcia DPM   [facility]    [address] [id number] [phone]                   Routing refill request to provider for review/approval because:  Drug not active on patient's medication list

## 2018-07-09 ENCOUNTER — OFFICE VISIT (OUTPATIENT)
Dept: PODIATRY | Facility: CLINIC | Age: 73
End: 2018-07-09
Payer: MEDICARE

## 2018-07-09 VITALS
HEIGHT: 64 IN | WEIGHT: 148 LBS | SYSTOLIC BLOOD PRESSURE: 156 MMHG | DIASTOLIC BLOOD PRESSURE: 98 MMHG | BODY MASS INDEX: 25.27 KG/M2

## 2018-07-09 DIAGNOSIS — M79.2 NEURITIS: Primary | ICD-10-CM

## 2018-07-09 PROCEDURE — 99203 OFFICE O/P NEW LOW 30 MIN: CPT | Performed by: PODIATRIST

## 2018-07-09 RX ORDER — AMLODIPINE BESYLATE 5 MG/1
5 TABLET ORAL
COMMUNITY
Start: 2018-04-06 | End: 2018-07-17

## 2018-07-09 RX ORDER — ASPIRIN 325 MG
325 TABLET ORAL
COMMUNITY
Start: 2018-04-06 | End: 2018-08-09

## 2018-07-09 RX ORDER — SERTRALINE HYDROCHLORIDE 25 MG/1
25 TABLET, FILM COATED ORAL
COMMUNITY
Start: 2018-04-06 | End: 2018-08-09

## 2018-07-09 RX ORDER — TRAZODONE HYDROCHLORIDE 50 MG/1
50-100 TABLET, FILM COATED ORAL
COMMUNITY
Start: 2018-04-06 | End: 2018-08-09

## 2018-07-09 NOTE — LETTER
"    2018         RE: Susi Aguila  [address]  [facility] [address]        Dear Colleague,    Thank you for referring your patient, Susi Aguila, to the [facility]. Please see a copy of my visit note below.      ASSESSMENT/PLAN:    Encounter Diagnosis   Name Primary?     Neuritis, left foot Yes     No acute findings clinically: no wound, no edema, no erythema.  No abnormalities or pain on palpation.      I explained that the history she provides suggests nerve pain. This cold be from adhesion in scar tissue, a \"stump\" neuroma.  She said that this pain is nothing like the pain she had prior to amputation.      I suggested that she massage the area with lotion or cream, and see if it desensitizes and I also suggested that she try capsaicin cream.      A referral to neurology was offered. She is not interested at this time.     Body mass index is 25.4 kg/(m^2).          Jayme Garcia DPM, FACFAS, MS    [place] [facility] Ankle [facility]      ____________________________________________________________________    HPI:         Chief Complaint: pain at site of surgery, right foot.    Onset of problem: 2 years  Pain/ discomfort is described as:  Burning, tingling, shooting  Ratin/10   Frequency:  Every night    The pain is made worse with bed covers contacting  Previous treatment: covering the involved area with a band-aid helps.     She has a history of chronic pain in the right 5th toe and multiple surgeries. Eventually I offered amputation the toe () which she elected, and I later removed some of the 5th met head which was prominent and causing pain. Current discomfort is at the site of 5th toe amputation.      Since I last saw her, she had surgery for a brain tumor and also suffered a stroke.    *  Patient Active Problem List   Diagnosis     Essential hypertension     Esophageal reflux     Anxiety state     Disorder of bone and cartilage     " Stricture and stenosis of esophagus     Cerebral aneurysm     HYPERLIPIDEMIA LDL GOAL <100     ACP (advance care planning)     Sciatica     Tailor's bunion     Dry eye syndrome     Restless leg syndrome     Insomnia     Anemia     Homonymous bilateral field defects in visual field     Arteriovenous malformation     Mixed incontinence     Cerebrovascular accident (CVA) (H)     Hypothyroidism, unspecified type     Proteinuria     Advanced directives, counseling/discussion     Neuropathy     Type 2 diabetes mellitus with other neurologic complication, without long-term current use of insulin (H)     Moderate major depression (H)     Iron deficiency anemia, unspecified iron deficiency anemia type   *  *  Past Surgical History:   Procedure Laterality Date     AMPUTATE TOE(S)  9/18/2012    Procedure: AMPUTATE TOE(S);  RIGHT FIFTH TOE AMPUTATION ;  Surgeon: Jayme Garcia DPM;  Location: [facility]     BRAIN SURGERY      x's 3     BUNIONECTOMY  9/3/2013    Procedure: BUNIONECTOMY;  RIGHT FOOT SIMPLE LEYDI'S BUNIONECTOMY ;  Surgeon: Jayme Garcia DPM;  Location:  OR     C NONSPECIFIC PROCEDURE  1998;     meningioma     C NONSPECIFIC PROCEDURE  1998    coil procedures, cerebral aneursyms     C NONSPECIFIC PROCEDURE  2001    intra-extracranial aneursym bypass     C NONSPECIFIC PROCEDURE  approx 8667-2722    plastic op/congenital facial abn     C NONSPECIFIC PROCEDURE  approx 1983    ooph     C NONSPECIFIC PROCEDURE  approx 1997    gb     C NONSPECIFIC PROCEDURE  approx 2001    cystocele repair     C NONSPECIFIC PROCEDURE  2005    hardware removal, skull     C NONSPECIFIC PROCEDURE  2006    upper endoscopy; dilatation     C NONSPECIFIC PROCEDURE  2007    neg cystoscopy     C NONSPECIFIC PROCEDURE  2008    cerebral aneursym     C NONSPECIFIC PROCEDURE  2011     Hammertoe reconstruction with exostectomy fifth digit, right foot.     CHOLECYSTECTOMY       COMBINED CYSTOSCOPY, RETROGRADES, URETEROSCOPY, INSERT STENT   1/17/2014    Procedure: COMBINED CYSTOSCOPY, RETROGRADES, URETEROSCOPY, INSERT STENT;  Cystoscopy, Right Retrograde, Right Ureteroscopy;  Surgeon: Db Chase MD;  Location: [facility]     COMBINED CYSTOSCOPY, RETROGRADES, URETEROSCOPY, LASER HOLMIUM LITHOTRIPSY URETER(S), INSERT STENT Left 4/7/2015    Procedure: COMBINED CYSTOSCOPY, RETROGRADES, URETEROSCOPY, LASER HOLMIUM LITHOTRIPSY URETER(S), INSERT STENT;  Surgeon: Db Chase MD;  Location: [facility]     CYSTOSCOPY, RETROGRADES, INSERT STENT URETER(S), COMBINED Left 3/2/2015    Procedure: COMBINED CYSTOSCOPY, RETROGRADES, INSERT STENT URETER(S);  Surgeon: Db Chase MD;  Location: [facility] OR     GENITOURINARY SURGERY       HC COLONOSCOPY THRU STOMA, DIAGNOSTIC  2004    nl colonoscopy; next due 2014     HEAD & NECK SURGERY       HYSTERECTOMY, PAP NO LONGER INDICATED  approx 1975    hyst/oop: ovarian cyst     LITHOTRIPSY  July 21, 2011     NONSPECIFIC PROCEDURE      eswl     OPEN REDUCTION INTERNAL FIXATION HIP BIPOLAR Left 11/23/2015    Procedure: OPEN REDUCTION INTERNAL FIXATION HIP BIPOLAR;  Surgeon: Matheus Og MD;  Location: [facility] OR     ORTHOPEDIC SURGERY     *  *  Current Outpatient Prescriptions   Medication Sig Dispense Refill     amLODIPine (NORVASC) 5 MG tablet Take 5 mg by mouth       amLODIPine (NORVASC) 5 MG tablet 1 tab twice a day 180 tablet 3     aspirin 325 MG tablet Take 325 mg by mouth       aspirin 81 MG tablet Take 1 tablet (81 mg) by mouth daily 90 tablet 3     atorvastatin (LIPITOR) 40 MG tablet Take 1 tablet (40 mg) by mouth daily 90 tablet 3     ferrous fumarate 65 mg, [place]. FE,-Vitamin C 125 mg (VITRON-C)  MG TABS tablet Take 1 tablet by mouth daily 30 tablet 0     gabapentin (NEURONTIN) 600 MG tablet 1-1.5 tabs three times a day 405 tablet 3     levothyroxine (SYNTHROID/LEVOTHROID) 88 MCG tablet Take 1 tablet (88 mcg) by mouth daily 90 tablet 3     lisinopril (PRINIVIL/ZESTRIL) 20 MG tablet TAKE 1  TABLET(20 MG) BY MOUTH DAILY 90 tablet 3     metFORMIN (GLUCOPHAGE) 1000 MG tablet Take 1 tablet (1,000 mg) by mouth 2 times daily (with meals) 180 tablet 3     naproxen (NAPROSYN) 500 MG tablet TAKE 1 TABLET(500 MG) BY MOUTH TWICE DAILY WITH MEALS AS NEEDED FOR PAIN 180 tablet 2     omeprazole (PRILOSEC OTC) 20 MG tablet Take 1 tablet (20 mg) by mouth 2 times daily (before meals) 180 tablet 3     order for DME Equipment being ordered: Blood Pressure Cuff 1 Device 0     order for DME Equipment being ordered: Diabetic shoes 1 Device 1     ranitidine (ZANTAC) 150 MG tablet Take 1 tablet (150 mg) by mouth 2 times daily 180 tablet 3     ROPINIROLE HYDROCHLORIDE 4 MG TABS TAKE 1 TABLET(4 MG) BY MOUTH AT BEDTIME 90 tablet 3     sertraline (ZOLOFT) 25 MG tablet Take 25 mg by mouth       tacrolimus (PROTOPIC) 0.1 % ointment Apply topically 2 times daily (under right eye)       tiZANidine (ZANAFLEX) 2 MG tablet Take 1 tablet (2 mg) by mouth 3 times daily 90 tablet 0     traZODone (DESYREL) 50 MG tablet Take  mg by mouth       traZODone (DESYREL) 50 MG tablet Take  mg by mouth At Bedtime       vitamin D (ERGOCALCIFEROL) 62564 UNIT capsule Take 1 capsule (50,000 Units) by mouth every 7 days for 8 doses 8 capsule 0     vitamin D (ERGOCALCIFEROL) 12484 UNIT capsule 1 tablet twice a week (on Wednesdays and Saturdays) 24 capsule 3       ROS:     A 10-point review of systems was performed and is positive for that noted in the HPI and as seen below.  All other areas are negative.     Numbness in feet?  occasional   Calf pain with walking? yes  Recent foot/ankle injury? no  Weight change over past 12 months? no  Self perception as overweight? no  Recent flu-like symptoms? no  Joint pain other than feet ? Knees, left hip    Social History:  Social History     Social History     Marital status:      Spouse name: N/A     Number of children: N/A     Years of education: N/A     Occupational History     Not on file.  "    Social History Main Topics     Smoking status: Passive Smoke Exposure - Never Smoker     Smokeless tobacco: Never Used      Comment: roommates smoke     Alcohol use No      Comment: high ball once a month     Drug use: No     Sexual activity: No      Comment: EFRA BSO     Other Topics Concern     Parent/Sibling W/ Cabg, Mi Or Angioplasty Before 65f 55m? No     Social History Narrative       Family history:  Family History   Problem Relation Age of Onset     Breast Cancer Mother      Respiratory Mother      Osteoperosis Mother      Alcohol/Drug Father      cirrhosis     Cancer Maternal Grandmother        EXAM:    Vitals: BP (!) 156/98 (BP Location: Right arm, Patient Position: Chair, Cuff Size: Adult Regular)  Ht 5' 4\" (1.626 m)  Wt 148 lb (67.1 kg)  BMI 25.4 kg/m2  BMI: Body mass index is 25.4 kg/(m^2).  Height: 5' 4\"    Constitutional/ general:  Pt is in no apparent distress, appears well-nourished.  Cooperative with history and physical exam.     Vascular:  Pedal pulses are palpable bilaterally for both the DP and PT arteries.  CFT < 3 sec.  No edema.  Pedal hair growth noted.     Neuro:  Alert and oriented x 3. Coordinated gait.  Light touch sensation is intact to the L4, L5, S1 distributions. No obvious deficits.  No evidence of neurological-based weakness, spasticity, or contracture in the lower extremities.     Derm: Normal texture and turgor.  No erythema, ecchymosis, or cyanosis.  No open lesions.     Musculoskeletal:    Lower extremity muscle strength is normal.  Patient is ambulatory without an assistive device or brace .  No gross deformities.  The right 5th toe is absent.         Jayme Garcia DPM, FACFAS, MS    [place] [facility] Pod[facility] & Ankle Surgery                Again, thank you for allowing me to participate in the care of your patient.        Sincerely,        Jayme Garcia DPM    "

## 2018-07-09 NOTE — PROGRESS NOTES
"  ASSESSMENT/PLAN:    Encounter Diagnosis   Name Primary?     Neuritis, left foot Yes     No acute findings clinically: no wound, no edema, no erythema.  No abnormalities or pain on palpation.      I explained that the history she provides suggests nerve pain. This cold be from adhesion in scar tissue, a \"stump\" neuroma.  She said that this pain is nothing like the pain she had prior to amputation.      I suggested that she massage the area with lotion or cream, and see if it desensitizes and I also suggested that she try capsaicin cream.      A referral to neurology was offered. She is not interested at this time.     Body mass index is 25.4 kg/(m^2).          Jayme Garcia, GAIL, FACFAS, MS    [place] [facility] & Ankle Surgery      ____________________________________________________________________    HPI:         Chief Complaint: pain at site of surgery, right foot.    Onset of problem: 2 years  Pain/ discomfort is described as:  Burning, tingling, shooting  Ratin/10   Frequency:  Every night    The pain is made worse with bed covers contacting  Previous treatment: covering the involved area with a band-aid helps.     She has a history of chronic pain in the right 5th toe and multiple surgeries. Eventually I offered amputation the toe () which she elected, and I later removed some of the 5th met head which was prominent and causing pain. Current discomfort is at the site of 5th toe amputation.      Since I last saw her, she had surgery for a brain tumor and also suffered a stroke.    *  Patient Active Problem List   Diagnosis     Essential hypertension     Esophageal reflux     Anxiety state     Disorder of bone and cartilage     Stricture and stenosis of esophagus     Cerebral aneurysm     HYPERLIPIDEMIA LDL GOAL <100     ACP (advance care planning)     Sciatica     Tailor's bunion     Dry eye syndrome     Restless leg syndrome     Insomnia     Anemia     Homonymous bilateral field " defects in visual field     Arteriovenous malformation     Mixed incontinence     Cerebrovascular accident (CVA) (H)     Hypothyroidism, unspecified type     Proteinuria     Advanced directives, counseling/discussion     Neuropathy     Type 2 diabetes mellitus with other neurologic complication, without long-term current use of insulin (H)     Moderate major depression (H)     Iron deficiency anemia, unspecified iron deficiency anemia type   *  *  Past Surgical History:   Procedure Laterality Date     AMPUTATE TOE(S)  9/18/2012    Procedure: AMPUTATE TOE(S);  RIGHT FIFTH TOE AMPUTATION ;  Surgeon: Jayme Garcia DPM;  Location: [facility]     BRAIN SURGERY      x's 3     BUNIONECTOMY  9/3/2013    Procedure: BUNIONECTOMY;  RIGHT FOOT SIMPLE LEYDI'S BUNIONECTOMY ;  Surgeon: Jayme Garcia DPM;  Location:  OR     C NONSPECIFIC PROCEDURE  1998;     meningioma     C NONSPECIFIC PROCEDURE  1998    coil procedures, cerebral aneursyms     C NONSPECIFIC PROCEDURE  2001    intra-extracranial aneursym bypass     C NONSPECIFIC PROCEDURE  approx 8020-3957    plastic op/congenital facial abn     C NONSPECIFIC PROCEDURE  approx 1983    ooph     C NONSPECIFIC PROCEDURE  approx 1997    gb     C NONSPECIFIC PROCEDURE  approx 2001    cystocele repair     C NONSPECIFIC PROCEDURE  2005    hardware removal, skull     C NONSPECIFIC PROCEDURE  2006    upper endoscopy; dilatation     C NONSPECIFIC PROCEDURE  2007    neg cystoscopy     C NONSPECIFIC PROCEDURE  2008    cerebral aneursym     C NONSPECIFIC PROCEDURE  2011     Hammertoe reconstruction with exostectomy fifth digit, right foot.     CHOLECYSTECTOMY       COMBINED CYSTOSCOPY, RETROGRADES, URETEROSCOPY, INSERT STENT  1/17/2014    Procedure: COMBINED CYSTOSCOPY, RETROGRADES, URETEROSCOPY, INSERT STENT;  Cystoscopy, Right Retrograde, Right Ureteroscopy;  Surgeon: Db Chase MD;  Location:  OR     COMBINED CYSTOSCOPY, RETROGRADES, URETEROSCOPY, LASER HOLMIUM  LITHOTRIPSY URETER(S), INSERT STENT Left 4/7/2015    Procedure: COMBINED CYSTOSCOPY, RETROGRADES, URETEROSCOPY, LASER HOLMIUM LITHOTRIPSY URETER(S), INSERT STENT;  Surgeon: Db Chase MD;  Location: [facility]     CYSTOSCOPY, RETROGRADES, INSERT STENT URETER(S), COMBINED Left 3/2/2015    Procedure: COMBINED CYSTOSCOPY, RETROGRADES, INSERT STENT URETER(S);  Surgeon: Db Chase MD;  Location: [facility] OR     GENITOURINARY SURGERY       HC COLONOSCOPY THRU STOMA, DIAGNOSTIC  2004    nl colonoscopy; next due 2014     HEAD & NECK SURGERY       HYSTERECTOMY, PAP NO LONGER INDICATED  approx 1975    hyst/oop: ovarian cyst     LITHOTRIPSY  July 21, 2011     NONSPECIFIC PROCEDURE      eswl     OPEN REDUCTION INTERNAL FIXATION HIP BIPOLAR Left 11/23/2015    Procedure: OPEN REDUCTION INTERNAL FIXATION HIP BIPOLAR;  Surgeon: Matheus Og MD;  Location: [facility] OR     ORTHOPEDIC SURGERY     *  *  Current Outpatient Prescriptions   Medication Sig Dispense Refill     amLODIPine (NORVASC) 5 MG tablet Take 5 mg by mouth       amLODIPine (NORVASC) 5 MG tablet 1 tab twice a day 180 tablet 3     aspirin 325 MG tablet Take 325 mg by mouth       aspirin 81 MG tablet Take 1 tablet (81 mg) by mouth daily 90 tablet 3     atorvastatin (LIPITOR) 40 MG tablet Take 1 tablet (40 mg) by mouth daily 90 tablet 3     ferrous fumarate 65 mg, [place]. FE,-Vitamin C 125 mg (VITRON-C)  MG TABS tablet Take 1 tablet by mouth daily 30 tablet 0     gabapentin (NEURONTIN) 600 MG tablet 1-1.5 tabs three times a day 405 tablet 3     levothyroxine (SYNTHROID/LEVOTHROID) 88 MCG tablet Take 1 tablet (88 mcg) by mouth daily 90 tablet 3     lisinopril (PRINIVIL/ZESTRIL) 20 MG tablet TAKE 1 TABLET(20 MG) BY MOUTH DAILY 90 tablet 3     metFORMIN (GLUCOPHAGE) 1000 MG tablet Take 1 tablet (1,000 mg) by mouth 2 times daily (with meals) 180 tablet 3     naproxen (NAPROSYN) 500 MG tablet TAKE 1 TABLET(500 MG) BY MOUTH TWICE DAILY WITH MEALS AS NEEDED FOR  PAIN 180 tablet 2     omeprazole (PRILOSEC OTC) 20 MG tablet Take 1 tablet (20 mg) by mouth 2 times daily (before meals) 180 tablet 3     order for DME Equipment being ordered: Blood Pressure Cuff 1 Device 0     order for DME Equipment being ordered: Diabetic shoes 1 Device 1     ranitidine (ZANTAC) 150 MG tablet Take 1 tablet (150 mg) by mouth 2 times daily 180 tablet 3     ROPINIROLE HYDROCHLORIDE 4 MG TABS TAKE 1 TABLET(4 MG) BY MOUTH AT BEDTIME 90 tablet 3     sertraline (ZOLOFT) 25 MG tablet Take 25 mg by mouth       tacrolimus (PROTOPIC) 0.1 % ointment Apply topically 2 times daily (under right eye)       tiZANidine (ZANAFLEX) 2 MG tablet Take 1 tablet (2 mg) by mouth 3 times daily 90 tablet 0     traZODone (DESYREL) 50 MG tablet Take  mg by mouth       traZODone (DESYREL) 50 MG tablet Take  mg by mouth At Bedtime       vitamin D (ERGOCALCIFEROL) 64774 UNIT capsule Take 1 capsule (50,000 Units) by mouth every 7 days for 8 doses 8 capsule 0     vitamin D (ERGOCALCIFEROL) 05608 UNIT capsule 1 tablet twice a week (on Wednesdays and Saturdays) 24 capsule 3       ROS:     A 10-point review of systems was performed and is positive for that noted in the HPI and as seen below.  All other areas are negative.     Numbness in feet?  occasional   Calf pain with walking? yes  Recent foot/ankle injury? no  Weight change over past 12 months? no  Self perception as overweight? no  Recent flu-like symptoms? no  Joint pain other than feet ? Knees, left hip    Social History:  Social History     Social History     Marital status:      Spouse name: N/A     Number of children: N/A     Years of education: N/A     Occupational History     Not on file.     Social History Main Topics     Smoking status: Passive Smoke Exposure - Never Smoker     Smokeless tobacco: Never Used      Comment: roommates smoke     Alcohol use No      Comment: high ball once a month     Drug use: No     Sexual activity: No      Comment:  "EFRA BSO     Other Topics Concern     Parent/Sibling W/ Cabg, Mi Or Angioplasty Before 65f 55m? No     Social History Narrative       Family history:  Family History   Problem Relation Age of Onset     Breast Cancer Mother      Respiratory Mother      Osteoperosis Mother      Alcohol/Drug Father      cirrhosis     Cancer Maternal Grandmother        EXAM:    Vitals: BP (!) 156/98 (BP Location: Right arm, Patient Position: Chair, Cuff Size: Adult Regular)  Ht 5' 4\" (1.626 m)  Wt 148 lb (67.1 kg)  BMI 25.4 kg/m2  BMI: Body mass index is 25.4 kg/(m^2).  Height: 5' 4\"    Constitutional/ general:  Pt is in no apparent distress, appears well-nourished.  Cooperative with history and physical exam.     Vascular:  Pedal pulses are palpable bilaterally for both the DP and PT arteries.  CFT < 3 sec.  No edema.  Pedal hair growth noted.     Neuro:  Alert and oriented x 3. Coordinated gait.  Light touch sensation is intact to the L4, L5, S1 distributions. No obvious deficits.  No evidence of neurological-based weakness, spasticity, or contracture in the lower extremities.     Derm: Normal texture and turgor.  No erythema, ecchymosis, or cyanosis.  No open lesions.     Musculoskeletal:    Lower extremity muscle strength is normal.  Patient is ambulatory without an assistive device or brace .  No gross deformities.  The right 5th toe is absent.         Jayme Garcia DPM, FACJONG, MS    [place] [facility] of Podiat[facility]/Foot & Ankle Surgery              "

## 2018-07-09 NOTE — MR AVS SNAPSHOT
After Visit Summary   7/9/2018    Susi Aguila    MRN: [id number]           Patient Information     Date Of Birth          1945        Visit Information        Provider Department      7/9/2018 11:00 AM Jayme Hernandez DPM [facility]        Care Instructions    Thank you for choosing [facility]!    DR. HERNANDEZ'S CLINIC LOCATIONS     MONDAY - [facility] WEDNESDAY - [facility]   [address] [address] [place] [address]   [phone] / [phone] / [phone]       THURSDAY - [place] SCHEDULE SURGERY: [phone]   [address] APPOINTMENTS: [phone]   [place] [address] BILLING QUESTIONS: [phone] / [phone]       Look into Capsacin Cream. One brand name is Zostrix.       BODY MASS INDEX (BMI)  Many things can cause foot and ankle problems. Foot structure, activity level, foot mechanics and injuries are common causes of pain.  One very important issue that often goes unmentioned, is body weight.  Extra weight can cause increased stress on muscles, ligaments, bones and tendons.  Sometimes just a few extra pounds is all it takes to put one over her/his threshold. Without reducing that stress, it can be difficult to alleviate pain. Some people are uncomfortable addressing this issue, but we feel it is important for you to think about it. As Foot &  Ankle specialists, our job is addressing the lower extremity problem and possible causes. Regarding extra body weight, we encourage patients to discuss diet and weight management plans with their primary care doctors. It is this team approach that gives you the best opportunity for pain relief and getting you back on your feet.                Follow-ups after your visit        Your next 10 appointments already scheduled     Jul 17, 2018  9:15 AM CDT   [place] SCREENING DIGITAL BILATERAL with EAMA1  "  [facility] ([facility])    [address] [place] [address],[address]  [place] [address] [phone]           Do not use any powder, lotion or deodorant under your arms or on your breast. If you do, we will ask you to remove it before your exam.  Wear comfortable, two-piece clothing.  If you have any allergies, tell your care team.  Bring any previous mammograms from other facilities or have them mailed to the breast center. Three-dimensional (3D) mammograms are available at [facility] locations in [facility], [facility], [facility], [facility], and [place]. [facility] locations include [place] and [facility] in [place]. Benefits of 3D mammograms include: - Improved rate of cancer detection - Decreases your chance of having to go back for more tests, which means fewer: - \"False-positive\" results (This means that there is an abnormal area but it isn't cancer.) - Invasive testing procedures, such as a biopsy or surgery - Can provide clearer images of the breast if you have dense breast tissue. 3D mammography is an optional exam that anyone can have with a 2D mammogram. It doesn't replace or take the place of a 2D mammogram. 2D mammograms remain an effective screening test for all women.  Not all insurance companies cover the cost of a 3D mammogram. Check with your insurance.            Jul 17, 2018 10:00 AM CDT   PHYSICAL with Alice Bloom MD   [facility] ([facility])    [address]  [address]  [facility] [address] [phone]            Jul 19, 2018  9:00 AM CDT   RETURN NEURO with Jayme Schuler MD   [facility] ([facility])    [address]  [address] Clin [address]  [facility] [address] [phone]              Future tests that were ordered for you today     Open Future Orders        Priority Expected Expires Ordered    [place] Screening Digital " "Bilateral Routine  2019            Who to contact     If you have questions or need follow up information about today's clinic visit or your schedule please contact [facility] directly at [phone].  Normal or non-critical lab and imaging results will be communicated to you by MyChart, letter or phone within 4 business days after the clinic has received the results. If you do not hear from us within 7 days, please contact the clinic through MyChart or phone. If you have a critical or abnormal lab result, we will notify you by phone as soon as possible.  Submit refill requests through [employer] or call your pharmacy and they will forward the refill request to us. Please allow 3 business days for your refill to be completed.          Additional Information About Your Visit        [employer] Information     [employer] lets you send messages to your doctor, view your test results, renew your prescriptions, schedule appointments and more. To sign up, go to [email or web address] . Click on \"Log in\" on the left side of the screen, which will take you to the Welcome page. Then click on \"Sign up Now\" on the right side of the page.     You will be asked to enter the access code listed below, as well as some personal information. Please follow the directions to create your username and password.     Your access code is: [email or web address]  Expires: 2018 11:10 AM     Your access code will  in 90 days. If you need help or a new code, please call your [facility] clinic or [phone].        Care EveryWhere ID     This is your Care EveryWhere ID. This could be used by other organizations to access your [facility] medical records  [id number]        Your Vitals Were     Height BMI (Body Mass Index)                5' 4\" (1.626 m) 25.4 kg/m2           Blood Pressure from Last 3 Encounters:   18 (!) 156/98   18 128/78   18 130/80    Weight from Last 3 Encounters:   18 148 " lb (67.1 kg)   05/01/18 148 lb (67.1 kg)   03/08/18 147 lb (66.7 kg)              Today, you had the following     No orders found for display       Primary Care Provider Office Phone # Fax #    Alma Rosa Contreras [phone] [address] [place] [address]        Equal Access to Services     [facility]: Hadii aad ku hadasho Soomaali, waaxda luqadaha, qaybta kaalmada adeegyada, waxay idiin hayaan adeeg kharash la'aan ah. So [facility] [phone].    ATENCIÓN: Si habla español, tiene a cabezas disposición servicios gratuitos de asistencia lingüística. Llame al [phone].    We comply with applicable federal civil rights laws and [place] laws. We do not discriminate on the basis of race, color, national origin, age, disability, sex, sexual orientation, or gender identity.            Thank you!     Thank you for choosing [facility]  for your care. Our goal is always to provide you with excellent care. Hearing back from our patients is one way we can continue to improve our services. Please take a few minutes to complete the written survey that you may receive in the mail after your visit with us. Thank you!             Your Updated Medication List - Protect others around you: Learn how to safely use, store and throw away your medicines at www.disposemymeds.org.          This list is accurate as of 7/9/18 11:31 AM.  Always use your most recent med list.                   Brand Name Dispense Instructions for use Diagnosis    * amLODIPine 5 MG tablet    NORVASC     Take 5 mg by mouth        * amLODIPine 5 MG tablet    NORVASC    180 tablet    1 tab twice a day    Essential hypertension       * aspirin 325 MG tablet      Take 325 mg by mouth        * aspirin 81 MG tablet     90 tablet    Take 1 tablet (81 mg) by mouth daily        atorvastatin 40 MG tablet    LIPITOR    90 tablet    Take 1 tablet (40 mg) by mouth daily    Hyperlipidemia LDL goal <100        ferrous fumarate 65 mg ([place]. FE)-Vitamin C 125 mg  MG Tabs tablet    VITRON-C    30 tablet    Take 1 tablet by mouth daily    Iron deficiency anemia, unspecified iron deficiency anemia type       gabapentin 600 MG tablet    NEURONTIN    405 tablet    1-1.5 tabs three times a day    Neuropathy       levothyroxine 88 MCG tablet    SYNTHROID/LEVOTHROID    90 tablet    Take 1 tablet (88 mcg) by mouth daily    Hypothyroidism, unspecified type       lisinopril 20 MG tablet    PRINIVIL/ZESTRIL    90 tablet    TAKE 1 TABLET(20 MG) BY MOUTH DAILY    Essential hypertension       metFORMIN 1000 MG tablet    GLUCOPHAGE    180 tablet    Take 1 tablet (1,000 mg) by mouth 2 times daily (with meals)    Type 2 diabetes mellitus with other neurologic complication, without long-term current use of insulin (H)       naproxen 500 MG tablet    NAPROSYN    180 tablet    TAKE 1 TABLET(500 MG) BY MOUTH TWICE DAILY WITH MEALS AS NEEDED FOR PAIN    Sciatica, unspecified laterality       omeprazole 20 MG tablet    priLOSEC OTC    180 tablet    Take 1 tablet (20 mg) by mouth 2 times daily (before meals)    Gastroesophageal reflux disease, esophagitis presence not specified       * order for DME     1 Device    Equipment being ordered: Diabetic shoes    Neuropathy, Type 2 diabetes mellitus with other neurologic complication, without long-term current use of insulin (H)       * order for DME     1 Device    Equipment being ordered: Blood Pressure Cuff    Essential hypertension       ranitidine 150 MG tablet    ZANTAC    180 tablet    Take 1 tablet (150 mg) by mouth 2 times daily    Gastroesophageal reflux disease, esophagitis presence not specified       ROPINIROLE HYDROCHLORIDE 4 MG Tabs     90 tablet    TAKE 1 TABLET(4 MG) BY MOUTH AT BEDTIME    Restless leg       sertraline 25 MG tablet    ZOLOFT     Take 25 mg by mouth        tacrolimus 0.1 % ointment    PROTOPIC     Apply topically 2 times daily (under right eye)        tiZANidine 2  MG tablet    ZANAFLEX    90 tablet    Take 1 tablet (2 mg) by mouth 3 times daily    Muscle spasm       * traZODone 50 MG tablet    DESYREL     Take  mg by mouth At Bedtime        * traZODone 50 MG tablet    DESYREL     Take  mg by mouth        * vitamin D 69827 UNIT capsule    ERGOCALCIFEROL    24 capsule    1 tablet twice a week (on Wednesdays and Saturdays)    Vitamin D deficiency       * vitamin D 69511 UNIT capsule    ERGOCALCIFEROL    8 capsule    Take 1 capsule (50,000 Units) by mouth every 7 days for 8 doses    Vitamin D deficiency       * Notice:  This list has 10 medication(s) that are the same as other medications prescribed for you. Read the directions carefully, and ask your doctor or other care provider to review them with you.

## 2018-07-09 NOTE — PATIENT INSTRUCTIONS
Thank you for choosing [facility]!    DR. HERNANDEZ'S CLINIC LOCATIONS     MONDAY - [place] WEDNESDAY - [place]   [address] [place] [address] [place] [address]   [phone] / [phone] / [phone]       THURSDAY - [place] SCHEDULE SURGERY: [phone]   [address] APPOINTMENTS: [phone]   [place] [address] BILLING QUESTIONS: [phone] / [phone]       Look into Capsacin Cream. One brand name is Zostrix.       BODY MASS INDEX (BMI)  Many things can cause foot and ankle problems. Foot structure, activity level, foot mechanics and injuries are common causes of pain.  One very important issue that often goes unmentioned, is body weight.  Extra weight can cause increased stress on muscles, ligaments, bones and tendons.  Sometimes just a few extra pounds is all it takes to put one over her/his threshold. Without reducing that stress, it can be difficult to alleviate pain. Some people are uncomfortable addressing this issue, but we feel it is important for you to think about it. As Foot &  Ankle specialists, our job is addressing the lower extremity problem and possible causes. Regarding extra body weight, we encourage patients to discuss diet and weight management plans with their primary care doctors. It is this team approach that gives you the best opportunity for pain relief and getting you back on your feet.

## 2018-07-17 PROBLEM — Z86.018 S/P RESECTION OF MENINGIOMA: Status: ACTIVE | Noted: 2018-07-17

## 2018-07-17 PROBLEM — Z98.890 S/P RESECTION OF MENINGIOMA: Status: ACTIVE | Noted: 2018-07-17

## 2018-07-17 PROBLEM — I69.354 HEMIPARESIS AFFECTING LEFT SIDE AS LATE EFFECT OF STROKE (H): Status: ACTIVE | Noted: 2018-07-17

## 2018-07-19 DIAGNOSIS — H53.40 VISUAL FIELD DEFECT: Primary | ICD-10-CM

## 2018-08-03 DIAGNOSIS — M62.838 MUSCLE SPASM: ICD-10-CM

## 2018-08-03 RX ORDER — TIZANIDINE 2 MG/1
TABLET ORAL
Qty: 90 TABLET | Refills: 5 | Status: SHIPPED | OUTPATIENT
Start: 2018-08-03 | End: 2018-12-12

## 2018-08-03 NOTE — TELEPHONE ENCOUNTER
Requested Prescriptions   Pending Prescriptions Disp Refills     tiZANidine (ZANAFLEX) 2 MG tablet [Pharmacy Med Name: TIZANIDINE HCL 2 MG TABLET] 90 tablet 0     Sig: TAKE 1 TABLET (2 MG) BY MOUTH 3 TIMES DAILY    There is no refill protocol information for this order        Last Written Prescription Date:  6/21/2018  Last Fill Quantity: 90,  # refills: 0   Last office visit: 5/1/2018 with prescribing provider:  5/1/2018   Future Office Visit:   Next 5 appointments (look out 90 days)     Aug 09, 2018 11:00 AM CDT   Office Visit with Alice Bloom MD   [facility] ([facility])    [address] [place] [address]  [address]  [facility] [address] [phone]

## 2018-08-09 ENCOUNTER — OFFICE VISIT (OUTPATIENT)
Dept: PEDIATRICS | Facility: CLINIC | Age: 73
End: 2018-08-09
Payer: MEDICARE

## 2018-08-09 ENCOUNTER — RADIANT APPOINTMENT (OUTPATIENT)
Dept: MAMMOGRAPHY | Facility: CLINIC | Age: 73
End: 2018-08-09
Attending: INTERNAL MEDICINE
Payer: MEDICARE

## 2018-08-09 VITALS
HEART RATE: 71 BPM | BODY MASS INDEX: 25.1 KG/M2 | HEIGHT: 64 IN | TEMPERATURE: 98.8 F | OXYGEN SATURATION: 98 % | WEIGHT: 147 LBS | SYSTOLIC BLOOD PRESSURE: 152 MMHG | DIASTOLIC BLOOD PRESSURE: 82 MMHG

## 2018-08-09 DIAGNOSIS — E11.49 TYPE 2 DIABETES MELLITUS WITH OTHER NEUROLOGIC COMPLICATION, WITHOUT LONG-TERM CURRENT USE OF INSULIN (H): Primary | ICD-10-CM

## 2018-08-09 DIAGNOSIS — Z12.31 VISIT FOR SCREENING MAMMOGRAM: ICD-10-CM

## 2018-08-09 DIAGNOSIS — I10 ESSENTIAL HYPERTENSION: ICD-10-CM

## 2018-08-09 DIAGNOSIS — Z78.0 ASYMPTOMATIC MENOPAUSAL STATE: ICD-10-CM

## 2018-08-09 DIAGNOSIS — I69.354 HEMIPARESIS AFFECTING LEFT SIDE AS LATE EFFECT OF STROKE (H): ICD-10-CM

## 2018-08-09 DIAGNOSIS — M85.80 OSTEOPENIA, UNSPECIFIED LOCATION: ICD-10-CM

## 2018-08-09 DIAGNOSIS — E03.9 HYPOTHYROIDISM, UNSPECIFIED TYPE: ICD-10-CM

## 2018-08-09 DIAGNOSIS — G62.9 NEUROPATHY: ICD-10-CM

## 2018-08-09 PROBLEM — Q85.01 NEUROFIBROMATOSIS, TYPE 1 (VON RECKLINGHAUSEN'S DISEASE) (H): Status: ACTIVE | Noted: 2018-08-09

## 2018-08-09 PROCEDURE — 99214 OFFICE O/P EST MOD 30 MIN: CPT | Performed by: INTERNAL MEDICINE

## 2018-08-09 PROCEDURE — 77067 SCR MAMMO BI INCL CAD: CPT | Mod: TC

## 2018-08-09 RX ORDER — GABAPENTIN 600 MG/1
TABLET ORAL
Qty: 405 TABLET | Refills: 3
Start: 2018-08-09 | End: 2018-12-12

## 2018-08-09 RX ORDER — LISINOPRIL 40 MG/1
40 TABLET ORAL DAILY
Qty: 30 TABLET | Refills: 1 | Status: SHIPPED | OUTPATIENT
Start: 2018-08-09 | End: 2018-12-12

## 2018-08-09 RX ORDER — AMLODIPINE BESYLATE 10 MG/1
10 TABLET ORAL AT BEDTIME
Qty: 90 TABLET | Refills: 1 | Status: SHIPPED | OUTPATIENT
Start: 2018-08-09 | End: 2019-04-19

## 2018-08-09 NOTE — PROGRESS NOTES
"  SUBJECTIVE:   Susi Aguila is a 72 year old female who presents to clinic today for the following health issues:      New Patient/Transfer of Care    Diabetes Follow-up      Patient is checking blood sugars: not at all    Diabetic concerns: None     Symptoms of hypoglycemia (low blood sugar): none     Paresthesias (numbness or burning in feet) or sores: Yes burning     Date of last diabetic eye exam: May 2017    Diabetes Management Resources    Hyperlipidemia Follow-Up      Rate your low fat/cholesterol diet?: good    Taking statin?  Yes, no muscle aches from statin    Other lipid medications/supplements?:  none    Hypertension Follow-up      Outpatient blood pressures are not being checked.    Low Salt Diet: no added salt    BP Readings from Last 2 Encounters:   08/09/18 152/82   07/09/18 (!) 156/98     Hemoglobin A1C (%)   Date Value   06/15/2018 6.6 (H)   02/08/2018 6.4 (H)     LDL Cholesterol Calculated (mg/dL)   Date Value   06/15/2018 81   09/28/2017 143 (H)       Amount of exercise or physical activity: None    Problems taking medications regularly: No    Medication side effects: none    Diet: regular (no restrictions)        Meningioma   Patient reports she has had brain tumor removed, still portions of the tumor left and want to preform cyber knife for meningioma. First appointment on 8/21 for 5 treatments. Has not had radiation in the past. Was tested for neurofibromatosis and was negative.      Diabetes   States her diabetes is well controlled; she does not check sugars at home. Last A1c in June was 6.6. On Metformin 1000 MG daily.     Anemia/Iron Deficiency   For anemia and iron defficiency the patient states she is not on iron supplements. Notes she can get constipated at times. Has been using colace to \"keep things moving\". Patient is also taking over the counter vitamin D.      Hypertension  States she started checking blood pressures but is unable to now since her home care worker left his job. " "BP in clinic was 152/82. Taking amlodipine 5 MG 2x a day.     RLS  Patient reports she is having severe RLS that is \"driving me nuts\". Notes she kicks the foot board off of her bed frame at night. Is on Requip.     Anxiety/Depression    Reports she is not taking Zoloft since she is \"not depressed\". Is taking 50 MG trazodone at night for sleep.     Esophageal Reflux   Using Ranitidine in addition to omeprazole BID. If she does not take medication she has severe heart burn when she lies down at night.     Thyroid  Has trouble remembering to take medication.     Other   Taking 81 MG aspirin, using atorvastatin as directed. Had been taking gabapentin 600 MG TID, but has been using 600 MG BID since the medication dissolves to quickly on tongue. Does not believe she needs more assistance, but would like a nurse to help manage medications. Does not leave house as much unless for medical appointments. Gives her care taker list and money to get grocery. Uses [employer] prn.       Problem list and histories reviewed & adjusted, as indicated.  Additional history: as documented    Patient Active Problem List   Diagnosis     Essential hypertension     Esophageal reflux     Anxiety state     Disorder of bone and cartilage     Stricture and stenosis of esophagus     Cerebral aneurysm     HYPERLIPIDEMIA LDL GOAL <100     ACP (advance care planning)     Sciatica     Tailor's bunion     Dry eye syndrome     Restless leg syndrome     Insomnia     Anemia     Homonymous bilateral field defects in visual field     Arteriovenous malformation     Mixed incontinence     Hypothyroidism, unspecified type     Proteinuria     Advanced directives, counseling/discussion     Neuropathy     Type 2 diabetes mellitus with other neurologic complication, without long-term current use of insulin (H)     Moderate major depression (H)     Iron deficiency anemia, unspecified iron deficiency anemia type     Hemiparesis affecting left side as late effect of " stroke (H)     S/P resection of meningioma     Past Surgical History:   Procedure Laterality Date     AMPUTATE TOE(S)  9/18/2012    Procedure: AMPUTATE TOE(S);  RIGHT FIFTH TOE AMPUTATION ;  Surgeon: Jayme Garcia DPM;  Location:  [facility]     BRAIN SURGERY      x's 3     BUNIONECTOMY  9/3/2013    Procedure: BUNIONECTOMY;  RIGHT FOOT SIMPLE LEYDI'S BUNIONECTOMY ;  Surgeon: Jayme Garcia DPM;  Location:  [facility]     C NONSPECIFIC PROCEDURE  1998;     meningioma     C NONSPECIFIC PROCEDURE  1998    coil procedures, cerebral aneursyms     C NONSPECIFIC PROCEDURE  2001    intra-extracranial aneursym bypass     C NONSPECIFIC PROCEDURE  approx [id number]    plastic op/congenital facial abn     C NONSPECIFIC PROCEDURE  approx 1983    ooph     C NONSPECIFIC PROCEDURE  approx 1997    gb     C NONSPECIFIC PROCEDURE  approx 2001    cystocele repair     C NONSPECIFIC PROCEDURE  2005    hardware removal, skull     C NONSPECIFIC PROCEDURE  2006    upper endoscopy; dilatation     C NONSPECIFIC PROCEDURE  2007    neg cystoscopy     C NONSPECIFIC PROCEDURE  2008    cerebral aneursym     C NONSPECIFIC PROCEDURE  2011     Hammertoe reconstruction with exostectomy fifth digit, right foot.     CHOLECYSTECTOMY       COMBINED CYSTOSCOPY, RETROGRADES, URETEROSCOPY, INSERT STENT  1/17/2014    Procedure: COMBINED CYSTOSCOPY, RETROGRADES, URETEROSCOPY, INSERT STENT;  Cystoscopy, Right Retrograde, Right Ureteroscopy;  Surgeon: Db Chase MD;  Location:  OR     COMBINED CYSTOSCOPY, RETROGRADES, URETEROSCOPY, LASER HOLMIUM LITHOTRIPSY URETER(S), INSERT STENT Left 4/7/2015    Procedure: COMBINED CYSTOSCOPY, RETROGRADES, URETEROSCOPY, LASER HOLMIUM LITHOTRIPSY URETER(S), INSERT STENT;  Surgeon: Db Chase MD;  Location:  OR     CYSTOSCOPY, RETROGRADES, INSERT STENT URETER(S), COMBINED Left 3/2/2015    Procedure: COMBINED CYSTOSCOPY, RETROGRADES, INSERT STENT URETER(S);  Surgeon: Db Chase MD;   Location: [facility]     GENITOURINARY SURGERY       HC COLONOSCOPY THRU STOMA, DIAGNOSTIC  2004    nl colonoscopy; next due 2014     HEAD & NECK SURGERY       HYSTERECTOMY, PAP NO LONGER INDICATED  approx 1975    hyst/oop: ovarian cyst     LITHOTRIPSY  July 21, 2011     NONSPECIFIC PROCEDURE      eswl     OPEN REDUCTION INTERNAL FIXATION HIP BIPOLAR Left 11/23/2015    Procedure: OPEN REDUCTION INTERNAL FIXATION HIP BIPOLAR;  Surgeon: Matheus Og MD;  Location: [facility] OR     ORTHOPEDIC SURGERY         Social History   Substance Use Topics     Smoking status: Passive Smoke Exposure - Never Smoker     Smokeless tobacco: Never Used      Comment: roommates smoke     Alcohol use No      Comment: high ball once a month     Family History   Problem Relation Age of Onset     Breast Cancer Mother      Respiratory Mother      Osteoperosis Mother      Alcohol/Drug Father      cirrhosis     Cancer Maternal Grandmother          Current Outpatient Prescriptions   Medication Sig Dispense Refill     amLODIPine (NORVASC) 5 MG tablet 1 tab twice a day 180 tablet 3     aspirin 325 MG tablet Take 325 mg by mouth       aspirin 81 MG tablet Take 1 tablet (81 mg) by mouth daily 90 tablet 3     atorvastatin (LIPITOR) 40 MG tablet Take 1 tablet (40 mg) by mouth daily 90 tablet 3     gabapentin (NEURONTIN) 600 MG tablet 1-1.5 tabs three times a day 405 tablet 3     levothyroxine (SYNTHROID/LEVOTHROID) 88 MCG tablet Take 1 tablet (88 mcg) by mouth daily 90 tablet 3     lisinopril (PRINIVIL/ZESTRIL) 20 MG tablet TAKE 1 TABLET(20 MG) BY MOUTH DAILY 90 tablet 3     metFORMIN (GLUCOPHAGE) 1000 MG tablet Take 1 tablet (1,000 mg) by mouth 2 times daily (with meals) 180 tablet 3     naproxen (NAPROSYN) 500 MG tablet TAKE 1 TABLET(500 MG) BY MOUTH TWICE DAILY WITH MEALS AS NEEDED FOR PAIN 180 tablet 2     omeprazole (PRILOSEC OTC) 20 MG tablet Take 1 tablet (20 mg) by mouth 2 times daily (before meals) 180 tablet 3     order for DME Equipment being  ordered: Blood Pressure Cuff 1 Device 0     order for DME Equipment being ordered: Diabetic shoes 1 Device 1     ROPINIROLE HYDROCHLORIDE 4 MG TABS TAKE 1 TABLET(4 MG) BY MOUTH AT BEDTIME 90 tablet 3     sertraline (ZOLOFT) 25 MG tablet Take 25 mg by mouth       tacrolimus (PROTOPIC) 0.1 % ointment Apply topically 2 times daily (under right eye)       tiZANidine (ZANAFLEX) 2 MG tablet TAKE 1 TABLET (2 MG) BY MOUTH 3 TIMES DAILY 90 tablet 5     traZODone (DESYREL) 50 MG tablet Take  mg by mouth       traZODone (DESYREL) 50 MG tablet Take  mg by mouth At Bedtime       vitamin D (ERGOCALCIFEROL) 07186 UNIT capsule Take 1 capsule (50,000 Units) by mouth every 7 days for 8 doses 8 capsule 0     vitamin D (ERGOCALCIFEROL) 59749 UNIT capsule 1 tablet twice a week (on Wednesdays and Saturdays) 24 capsule 3     ferrous fumarate 65 mg, [place]. FE,-Vitamin C 125 mg (VITRON-C)  MG TABS tablet Take 1 tablet by mouth daily (Patient not taking: Reported on 8/9/2018) 30 tablet 0     ranitidine (ZANTAC) 150 MG tablet Take 1 tablet (150 mg) by mouth 2 times daily 180 tablet 3     Allergies   Allergen Reactions     Amoxicillin      itchy     Penicillins Itching     Recent Labs   Lab Test  06/15/18   0859  05/01/18   1140  03/08/18   0953   02/08/18   0811  09/28/17   1052  08/10/17   1225   09/06/16   1050   A1C  6.6*   --    --    --   6.4*  6.8*   --    < >  6.5*   LDL  81   --    --    --    --   143*   --    --   45   HDL  67   --    --    --    --   41*   --    --   50   TRIG  99   --    --    --    --   201*   --    --   259*   ALT   --    --   25   --    --   14  19   < >  28   CR   --   0.86  0.89   < >  0.94  0.85  1.72*   < >  0.72   GFRESTIMATED   --   65  62   < >  58*  66  29*   < >  80   GFRESTBLACK   --   78  75   < >  71  79  35*   < >  >90   GFR Calc     POTASSIUM   --   4.2  3.9   < >  4.5  4.0  5.1   < >  4.6   TSH   --   0.97  0.03*   --    --   7.04*   --    < >   --     < > =  "values in this interval not displayed.      BP Readings from Last 3 Encounters:   08/09/18 152/82   07/09/18 (!) 156/98   05/01/18 128/78    Wt Readings from Last 3 Encounters:   08/09/18 66.7 kg (147 lb)   07/09/18 67.1 kg (148 lb)   05/01/18 67.1 kg (148 lb)                  Labs reviewed in EPIC    Reviewed and updated as needed this visit by clinical staff  Tobacco  Allergies  Meds  Med Hx  Surg Hx  Fam Hx  Soc Hx      Reviewed and updated as needed this visit by Provider         ROS:  Constitutional, HEENT, cardiovascular, pulmonary, GI, , musculoskeletal, neuro, skin and psych systems are negative, except as otherwise noted.    This document serves as a record of the services and decisions personally performed and made by Alice Bloom MD. It was created on her behalf by Sapna Hedrick, a trained medical scribe. The creation of this document is based the provider's statements to the medical scribe.    Sapna Hedrick August 9, 2018 11:16 AM  OBJECTIVE:     /82 (BP Location: Right arm, Patient Position: Sitting, Cuff Size: Adult Regular)  Pulse 71  Temp 98.8  F (37.1  C) (Oral)  Ht 1.626 m (5' 4\")  Wt 66.7 kg (147 lb)  SpO2 98%  BMI 25.23 kg/m2  Body mass index is 25.23 kg/(m^2).  GENERAL:  alert and no distress.  Wheelchair bound   HENT: ear canals and TM's normal, nose and mouth without ulcers or lesions  RESP: lungs clear to auscultation - no rales, rhonchi or wheezes  CV: regular rate and rhythm, normal S1 S2, no S3 or S4, no murmur, click or rub, no peripheral edema   ABDOMEN: soft, nontender, and bowel sounds normal.  Unable to assess HSM or masses due to inability to get on exam table.    MS: wheelchair bound with right sided hemiparesis from stroke. Right  Facial deformity of unknown etiology and right sided facial droop.      PSYCH: mentation appears normal, affect normal/bright  Diabetic foot exam: normal DP and PT pulses, no trophic changes or ulcerative lesions, normal " sensory exam and normal monofilament exam      Diagnostic Test Results:  Results for orders placed or performed in visit on 08/09/18 (from the past 24 hour(s))   MA Screening Digital Bilateral    Narrative    Examination: Bilateral digital screening mammography with computer  aided detection, 8/9/2018 10:44 AM.    Comparison: 01/19/2016, 05/22/2014, 05/13/213, 05/22/2012    History: No current breast concerns. Mother with breast cancer.  Patient reports prior benign biopsy.    BREAST DENSITY: Scattered fibroglandular densities.    COMMENTS:  No suspicious finding.  Biopsy marker in the LEFT breast.      Impression    IMPRESSION: BI-RADS CATEGORY: 1 -  NEGATIVE.    RECOMMENDED FOLLOW-UP: Annual Mammography.      The patient will be notified of the results.     KRIS NORWOOD MD     *Note: Due to a large number of results and/or encounters for the requested time period, some results have not been displayed. A complete set of results can be found in Results Review.       ASSESSMENT/PLAN:   11:39- 12:15     I spent 36 min face to face with patient over 50% in counseling on issues as detailed below.    (E11.49) Type 2 diabetes mellitus with other neurologic complication, without long-term current use of insulin (H)  (primary encounter diagnosis)  -- controlled; last a1c 6.6 in June   -- advised metformin 1000 MG BID; unclear if she is actually taking second dose of medication.  May benefit from switch to metformin ER to simplify her medication regimen.   -- follow up co-visit with MTM scheduled       (I69.354) Hemiparesis affecting left side as late effect of stroke (H)  -- continue with current care plan w/o changes   -- encouraged patient to get more help around house  -- will discuss with  getting nurse for medication management   -- scheduled follow up co-visit with MTM   --pt would benefit from electric wheelchair but states she's been told that the hallways where she lives are too narrow.  She is  essentially homebound due to difficulty navigating her wheelchair.  We may want to re-visit this with home care in the future.      (E03.9) Hypothyroidism, unspecified type  -- advised patient to take medication in the morning   -- last thyroid check 5/2018 was normal  --She is taking med with coffee in the am but doesn't think she can take it on an empty stomach     (I10) Essential hypertension  -- blood pressure too high; would also benefit from simplifying her medication regimen  -- will switch amlodipine from BID dosing to every day dosing.  Pt instructed to take medication at bedtime  -- increase lisinopril to 40 MG ; take in the am  --if she needs additional bp control could consider adding hydrochlorothiazide although I'm concerned it will worsen her urinary incontinence.  Otherwise next step would be to add a long acting b blocker   -d/w patient that considering her cerebral aneurysms it is critical that we get better control of her bp  Plan: amLODIPine (NORVASC) 10 MG tablet, lisinopril         (PRINIVIL/ZESTRIL) 40 MG tablet            (G62.9) Neuropathy  -- controlled with gabapentin BID   -- this may be making her tired during the day, states she doesn't remember to take smaller dose 3x daily   Plan: gabapentin (NEURONTIN) 600 MG tablet           (M85.80) Osteopenia, unspecified location  -- due for dexa     (Z78.0) Asymptomatic menopausal state  Plan: DX Hip/Pelvis/Spine    Pt appears to be struggling with complexity of medication regimen as well as getting enough help at home.  She states her previous caretaker has had to take more time to care for his wife.  She does not want other caretakers in her house.  I'm unclear as to how well she is functioning at home.  This will need to be discussed at future visits.             Follow up co-visit with MTM on September     The information in this document, created by the medical scribe for me, accurately reflects the services I personally performed and the  decisions made by me. I have reviewed and approved this document for accuracy prior to leaving the patient care area.  Alice Bloom MD  [facility]

## 2018-08-09 NOTE — MR AVS SNAPSHOT
After Visit Summary   8/9/2018    Susi Aguila    MRN: [id number]           Patient Information     Date Of Birth          1945        Visit Information        Provider Department      8/9/2018 11:00 AM Alice Bloom MD [facility] [place]        Today's Diagnoses     Type 2 diabetes mellitus with other neurologic complication, without long-term current use of insulin (H)    -  1    Hemiparesis affecting left side as late effect of stroke (H)        Hypothyroidism, unspecified type        Essential hypertension        Neuropathy        Osteopenia, unspecified location        Asymptomatic menopausal state          Care Instructions    Switch to taking Amlodipine 10 MG at bedtime (you can take 2, 5 MG pills until you run out).     Increase Lisinopril to 40 MG, you have 20 MG right now and can take 2 of those pills until you run out.     Goal is to have blood pressure under 140/90.     Schedule appointment with pharmacist (Leticia) to manage medications.     You will want to be on a stool softener (something with senna) if we start on iron supplement.       Medication List:     Morning   -- First thing in morning: levothyroixine- do not eat 45 min after taking (coffee okay)   -- with breakfast:      - baby aspirin      - gabapentin      - lisinopril,        -prilosec (omeprazole)        -ranitidine       - metformin     Noon   -- tizanidine     Dinner  -- gabapentin   -- metformin   -- Prilosec   -- ranitidine     Bed Time   -- amlodipine   -- atorvastatin   -- ropinirole   -- tizanidine   -- trazodone             Follow-ups after your visit        Follow-up notes from your care team     Return in about 1 month (around 9/9/2018).      Your next 10 appointments already scheduled     Aug 16, 2018  9:00 AM CDT   RETURN NEURO with Jayme Schuler MD   Eye Clinic ([facility])    Nate Schaeffer [address]  [facility]  [facility] [address]  "  [phone]              Future tests that were ordered for you today     Open Future Orders        Priority Expected Expires Ordered    DX Hip/Pelvis/Spine Routine  2019            Who to contact     If you have questions or need follow up information about today's clinic visit or your schedule please contact [facility] [place] directly at [phone].  Normal or non-critical lab and imaging results will be communicated to you by MyChart, letter or phone within 4 business days after the clinic has received the results. If you do not hear from us within 7 days, please contact the clinic through [employer] or phone. If you have a critical or abnormal lab result, we will notify you by phone as soon as possible.  Submit refill requests through [employer] or call your pharmacy and they will forward the refill request to us. Please allow 3 business days for your refill to be completed.          Additional Information About Your Visit        [employer] Information     [employer] lets you send messages to your doctor, view your test results, renew your prescriptions, schedule appointments and more. To sign up, go to [email or web address] . Click on \"Log in\" on the left side of the screen, which will take you to the Welcome page. Then click on \"Sign up Now\" on the right side of the page.     You will be asked to enter the access code listed below, as well as some personal information. Please follow the directions to create your username and password.     Your access code is: [email or web address]  Expires: 10/31/2018  6:30 AM     Your access code will  in 90 days. If you need help or a new code, please call your [facility] clinic or [phone].        Care EveryWhere ID     This is your Care EveryWhere ID. This could be used by other organizations to access your [facility] medical records  [id number]        Your Vitals Were     Pulse Temperature Height Pulse Oximetry BMI (Body Mass Index)       71 98.8  F (37.1  C) " "(Oral) 5' 4\" (1.626 m) 98% 25.23 kg/m2        Blood Pressure from Last 3 Encounters:   08/09/18 152/82   07/09/18 (!) 156/98   05/01/18 128/78    Weight from Last 3 Encounters:   08/09/18 147 lb (66.7 kg)   07/09/18 148 lb (67.1 kg)   05/01/18 148 lb (67.1 kg)                 Today's Medication Changes          These changes are accurate as of 8/9/18 12:13 PM.  If you have any questions, ask your nurse or doctor.               These medicines have changed or have updated prescriptions.        Dose/Directions    amLODIPine 10 MG tablet   Commonly known as:  NORVASC   This may have changed:    - medication strength  - how much to take  - how to take this  - when to take this  - additional instructions   Used for:  Essential hypertension   Changed by:  Alice Bloom MD        Dose:  10 mg   Take 1 tablet (10 mg) by mouth At Bedtime   Quantity:  90 tablet   Refills:  1       aspirin 81 MG tablet   This may have changed:  Another medication with the same name was removed. Continue taking this medication, and follow the directions you see here.   Changed by:  Alice Bloom MD        Dose:  81 mg   Take 1 tablet (81 mg) by mouth daily   Quantity:  90 tablet   Refills:  3       gabapentin 600 MG tablet   Commonly known as:  NEURONTIN   This may have changed:  additional instructions   Used for:  Neuropathy   Changed by:  Alice Bloom MD        2  Tabs BID  times a day   Quantity:  405 tablet   Refills:  3       lisinopril 40 MG tablet   Commonly known as:  PRINIVIL/ZESTRIL   This may have changed:    - medication strength  - how much to take  - how to take this  - when to take this  - additional instructions   Used for:  Essential hypertension   Changed by:  Alice Bloom MD        Dose:  40 mg   Take 1 tablet (40 mg) by mouth daily   Quantity:  30 tablet   Refills:  1       traZODone 50 MG tablet   Commonly known as:  DESYREL   This may have changed:  Another medication with " the same name was removed. Continue taking this medication, and follow the directions you see here.   Changed by:  Alice Bloom MD        Dose:   mg   Take  mg by mouth At Bedtime   Refills:  0         Stop taking these medicines if you haven't already. Please contact your care team if you have questions.     ferrous fumarate 65 mg ([place]. FE)-Vitamin C 125 mg  MG Tabs tablet   Commonly known as:  VITRON-C   Stopped by:  Alice Bloom MD           order for DME   Stopped by:  Alice Bloom MD           sertraline 25 MG tablet   Commonly known as:  ZOLOFT   Stopped by:  Alice Bloom MD           vitamin D 52646 UNIT capsule   Commonly known as:  ERGOCALCIFEROL   Stopped by:  Alice Bloom MD                Where to get your medicines      These medications were sent to [facility]/pharmacy #[id number] - [place], [place] - [address] AT [address] [place], [place] [address]     Phone:  [phone]     amLODIPine 10 MG tablet    lisinopril 40 MG tablet         Some of these will need a paper prescription and others can be bought over the counter.  Ask your nurse if you have questions.     You don't need a prescription for these medications     gabapentin 600 MG tablet                Primary Care Provider Office Phone # Fax #    Alma Rosa Contreras [phone]       [address] [place] [address]        Equal Access to Services     [facility] AH: Hadii tyler quiñoneso Sojasen, waaxda luqadaha, qaybta kaalmada adeegyada, waxfortino travis miller. So [facility] [place] [phone].    ATENCIÓN: Si habla figueroa, tiene a cabezas disposición servicios gratuitos de asistencia lingüística. Marco al [phone].    We comply with applicable federal civil rights laws and [place] laws. We do not discriminate on the basis of race, color, national origin, age, disability, sex, sexual  orientation, or gender identity.            Thank you!     Thank you for choosing [facility] [place]  for your care. Our goal is always to provide you with excellent care. Hearing back from our patients is one way we can continue to improve our services. Please take a few minutes to complete the written survey that you may receive in the mail after your visit with us. Thank you!             Your Updated Medication List - Protect others around you: Learn how to safely use, store and throw away your medicines at www.disposemymeds.org.          This list is accurate as of 8/9/18 12:13 PM.  Always use your most recent med list.                   Brand Name Dispense Instructions for use Diagnosis    amLODIPine 10 MG tablet    NORVASC    90 tablet    Take 1 tablet (10 mg) by mouth At Bedtime    Essential hypertension       aspirin 81 MG tablet     90 tablet    Take 1 tablet (81 mg) by mouth daily        atorvastatin 40 MG tablet    LIPITOR    90 tablet    Take 1 tablet (40 mg) by mouth daily    Hyperlipidemia LDL goal <100       gabapentin 600 MG tablet    NEURONTIN    405 tablet    2  Tabs BID  times a day    Neuropathy       levothyroxine 88 MCG tablet    SYNTHROID/LEVOTHROID    90 tablet    Take 1 tablet (88 mcg) by mouth daily    Hypothyroidism, unspecified type       lisinopril 40 MG tablet    PRINIVIL/ZESTRIL    30 tablet    Take 1 tablet (40 mg) by mouth daily    Essential hypertension       metFORMIN 1000 MG tablet    GLUCOPHAGE    180 tablet    Take 1 tablet (1,000 mg) by mouth 2 times daily (with meals)    Type 2 diabetes mellitus with other neurologic complication, without long-term current use of insulin (H)       naproxen 500 MG tablet    NAPROSYN    180 tablet    TAKE 1 TABLET(500 MG) BY MOUTH TWICE DAILY WITH MEALS AS NEEDED FOR PAIN    Sciatica, unspecified laterality       omeprazole 20 MG tablet    priLOSEC OTC    180 tablet    Take 1 tablet (20 mg) by mouth 2 times daily (before meals)     Gastroesophageal reflux disease, esophagitis presence not specified       ranitidine 150 MG tablet    ZANTAC    180 tablet    Take 1 tablet (150 mg) by mouth 2 times daily    Gastroesophageal reflux disease, esophagitis presence not specified       ROPINIROLE HYDROCHLORIDE 4 MG Tabs     90 tablet    TAKE 1 TABLET(4 MG) BY MOUTH AT BEDTIME    Restless leg       tacrolimus 0.1 % ointment    PROTOPIC     Apply topically 2 times daily (under right eye)        tiZANidine 2 MG tablet    ZANAFLEX    90 tablet    TAKE 1 TABLET (2 MG) BY MOUTH 3 TIMES DAILY    Muscle spasm       traZODone 50 MG tablet    DESYREL     Take  mg by mouth At Bedtime

## 2018-08-09 NOTE — PATIENT INSTRUCTIONS
Switch to taking Amlodipine 10 MG at bedtime (you can take 2, 5 MG pills until you run out).     Increase Lisinopril to 40 MG, you have 20 MG right now and can take 2 of those pills until you run out.     Goal is to have blood pressure under 140/90.     Schedule appointment with pharmacist (Leticia) to manage medications.     You will want to be on a stool softener (something with senna) if we start on iron supplement.       Medication List:     Morning   -- First thing in morning: levothyroixine- do not eat 45 min after taking (coffee okay)   -- with breakfast:      - baby aspirin      - gabapentin      - lisinopril,        -prilosec (omeprazole)        -ranitidine       - metformin     Noon   -- tizanidine     Dinner  -- gabapentin   -- metformin   -- Prilosec   -- ranitidine     Bed Time   -- amlodipine   -- atorvastatin   -- ropinirole   -- tizanidine   -- trazodone

## 2018-08-10 ENCOUNTER — TELEPHONE (OUTPATIENT)
Dept: PEDIATRICS | Facility: CLINIC | Age: 73
End: 2018-08-10

## 2018-08-10 NOTE — TELEPHONE ENCOUNTER
Dr. Bloom:    Looks like the Pt established care with you yesterday.   Leslye from [facility] called to see if you would willing to assume home care orders for the Pt.   Per Leslye, the original home care referral was placed by [facility] provider, however since she is now establishing with you, they are wondering if you would take over orders?    The following is needed:    Skilled Nursing 1 time this week, 2/week for 3 weeks and 1/week for 2 weeks.   The Pt declined HHA help.   SW, OT and PT eval.     Please advise. If you are ok with this, RN will call Leslye back and give VO for above.     Leslye at [facility]: [phone]. Ok to leave detailed message.     Leslye Forrest RN -- [facility]

## 2018-08-10 NOTE — TELEPHONE ENCOUNTER
Domingo Gavin,  Yes, that is fine.  I will take over her home care orders.     I would recommend an RN visit.  Pt needs help setting up her meds and is getting confused and missing meds.  I wrote a list for her of what to take when and updated her medication list.  This is all in her after visit summary.   Alice Bloom MD

## 2018-08-13 NOTE — TELEPHONE ENCOUNTER
LM with Mayra RN with [facility] with directions below.  No further questions.  Will call back if any other questions or concerns.  TING Chavez RN

## 2018-08-13 NOTE — TELEPHONE ENCOUNTER
Phone number below is incorrect.  I called [facility] and spoke with Zeynep in triage, ([phone])-and they will page Leslye to call me.  TING Chavez RN

## 2018-08-13 NOTE — TELEPHONE ENCOUNTER
Will route to [place] Triage pool. Flex RN not working at [facility] today, this was routed directly to me.     Leslye Forrest, RN -- [facility]

## 2018-08-16 ENCOUNTER — MEDICAL CORRESPONDENCE (OUTPATIENT)
Dept: HEALTH INFORMATION MANAGEMENT | Facility: CLINIC | Age: 73
End: 2018-08-16

## 2018-08-21 ENCOUNTER — TELEPHONE (OUTPATIENT)
Dept: PEDIATRICS | Facility: CLINIC | Age: 73
End: 2018-08-21

## 2018-08-21 NOTE — TELEPHONE ENCOUNTER
Reason for call:  Home Healthcare Reason for Call:  Home Health Care    Ora with [facility] called regarding (reason for call): Orders    Orders are needed for this patient. OT    PT: NA    OT: Requesting further OT visits, 5 additional visits.    Skilled Nursing: NA    Pt Provider: Dr. Bloom    Phone Number Homecare Nurse can be reached at: [phone]    Can we leave a detailed message on this number? YES    Phone number patient can be reached at: Home number on file [phone] (home)    Best Time: any    Call taken on 8/21/2018 at 3:55 PM by Miguelina Garcia      Phone number to reach patient:  Home number on file [phone] (home)    Best Time:  any    Can we leave a detailed message on this number?  Not Applicable

## 2018-08-21 NOTE — TELEPHONE ENCOUNTER
LM approving OT orders below per protocol.  Advised to call with any other questions or concerns.  TING Chavez RN

## 2018-09-04 ENCOUNTER — TELEPHONE (OUTPATIENT)
Dept: PEDIATRICS | Facility: CLINIC | Age: 73
End: 2018-09-04

## 2018-09-04 NOTE — TELEPHONE ENCOUNTER
PCP:    Prosper from [facility] called in to report that the Pt missed a few days of her Requip medication and noticed that her RLS actually improved without the medication. She noticed once she went back on the medication, her symptoms drastically improved.    Please advise.     Prosper: [phone]    Leslye Forrest RN -- [facility]

## 2018-09-05 NOTE — TELEPHONE ENCOUNTER
Called and Left detailed message with Prosper with the below information. Will discontinue medication off of medication list. Advised Prosper to keep us posted if anything changes with the Pt's symptoms.     Leslye Forrest RN -- [facility]

## 2018-09-05 NOTE — TELEPHONE ENCOUNTER
PCP:    I apologize for my typo, when she went OFF the medication her symptoms improved, when she went back ON the medication, her symptoms returned/worsened. Essentially she is wanting to know if you agree with her stopping the medication since her symptoms seem to be better while OFF the medication. Thanks.     Leslye Forrest RN -- [facility]

## 2018-09-05 NOTE — TELEPHONE ENCOUNTER
Yes, I think going off the requip is just fine.  Sometimes what can happen with requip is that over time it can augment symptoms.  She may be able to go back on it in the future and have good results again, but staying off for now makes sense.   Alice Bloom MD

## 2018-09-17 ENCOUNTER — MEDICAL CORRESPONDENCE (OUTPATIENT)
Dept: HEALTH INFORMATION MANAGEMENT | Facility: CLINIC | Age: 73
End: 2018-09-17

## 2018-10-04 DIAGNOSIS — H53.462 HOMONYMOUS HEMIANOPIA, LEFT: Primary | ICD-10-CM

## 2018-10-04 DIAGNOSIS — H53.40 VISUAL FIELD DEFECT: ICD-10-CM

## 2018-10-08 ENCOUNTER — OFFICE VISIT (OUTPATIENT)
Dept: OPHTHALMOLOGY | Facility: CLINIC | Age: 73
End: 2018-10-08
Attending: OPHTHALMOLOGY
Payer: MEDICARE

## 2018-10-08 DIAGNOSIS — H25.13 AGE-RELATED NUCLEAR CATARACT OF BOTH EYES: ICD-10-CM

## 2018-10-08 DIAGNOSIS — H53.462 HOMONYMOUS HEMIANOPIA, LEFT: Primary | ICD-10-CM

## 2018-10-08 DIAGNOSIS — H04.123 DRY EYE SYNDROME OF BOTH EYES: ICD-10-CM

## 2018-10-08 PROCEDURE — 68761 CLOSE TEAR DUCT OPENING: CPT | Mod: ZF | Performed by: OPHTHALMOLOGY

## 2018-10-08 PROCEDURE — 92081 LIMITED VISUAL FIELD XM: CPT | Mod: ZF | Performed by: OPHTHALMOLOGY

## 2018-10-08 PROCEDURE — 92015 DETERMINE REFRACTIVE STATE: CPT | Mod: GY,ZF

## 2018-10-08 PROCEDURE — G0463 HOSPITAL OUTPT CLINIC VISIT: HCPCS | Mod: ZF

## 2018-10-08 RX ORDER — ROPINIROLE 4 MG/1
4 TABLET, FILM COATED ORAL DAILY
COMMUNITY
End: 2018-12-13

## 2018-10-08 ASSESSMENT — REFRACTION_WEARINGRX
OD_AXIS: 144
OD_ADD: +2.50
SPECS_TYPE: BIFOCAL
OS_AXIS: 165
OS_CYLINDER: +0.75
OD_SPHERE: +0.50
OS_SPHERE: +0.75
OD_CYLINDER: +0.75
OS_ADD: +2.50

## 2018-10-08 ASSESSMENT — CONF VISUAL FIELD
OS_SUPERIOR_NASAL_RESTRICTION: 3
OS_INFERIOR_TEMPORAL_RESTRICTION: 1
OD_INFERIOR_NASAL_RESTRICTION: 1
OD_SUPERIOR_NASAL_RESTRICTION: 1
METHOD: COUNTING FINGERS
OS_SUPERIOR_TEMPORAL_RESTRICTION: 1

## 2018-10-08 ASSESSMENT — VISUAL ACUITY
OD_CC: 20/100
METHOD: SNELLEN - LINEAR
OS_CC+: -1
OS_CC: 20/25
CORRECTION_TYPE: GLASSES

## 2018-10-08 ASSESSMENT — SLIT LAMP EXAM - LIDS
COMMENTS: DERMATOCHALASIS, MGD, BLEPHARITIS
COMMENTS: DERMATOCHALASIS, MGD, BLEPHARITIS

## 2018-10-08 ASSESSMENT — REFRACTION_MANIFEST
OS_AXIS: 150
OD_AXIS: 155
OS_ADD: +2.50
OD_CYLINDER: +1.25
OD_ADD: +2.50
OS_CYLINDER: +0.75
OS_SPHERE: +0.25
OD_SPHERE: PLANO

## 2018-10-08 ASSESSMENT — TONOMETRY
IOP_METHOD: TONOPEN
OD_IOP_MMHG: 21
OS_IOP_MMHG: 21

## 2018-10-08 ASSESSMENT — CUP TO DISC RATIO
OS_RATIO: 0.7
OD_RATIO: 0.8

## 2018-10-08 NOTE — MR AVS SNAPSHOT
After Visit Summary   10/8/2018    Susi Aguila    MRN: [id number]           Patient Information     Date Of Birth          1945        Visit Information        Provider Department      10/8/2018 2:30 PM Jayme Schuler MD [facility]        Today's Diagnoses     Homonymous hemianopia, left    -  1    Dry eye syndrome of both eyes        Age-related nuclear cataract of both eyes           Follow-ups after your visit        Your next 10 appointments already scheduled     Oct 10, 2018  8:40 AM CDT   Office Visit with Alice Bloom MD   [facility] ([facility]    [address] [place] [address]  [address]  [facility] [address] [phone]           Bring a current list of meds and any records pertaining to this visit. For Physicals, please bring immunization records and any forms needing to be filled out. Please arrive 10 minutes early to complete paperwork.            Oct 10, 2018  9:00 AM CDT   Office Visit with Leticia Urbina [facility] ([facility]    [address] [place] [address]  [address]  [facility] [id number] [phone]           Bring a current list of meds and any records pertaining to this visit. For Physicals, please bring immunization records and any forms needing to be filled out. Please arrive 10 minutes early to complete paperwork.            Oct 17, 2018 12:30 PM CDT   NEW GENERAL with Jayme Fuchs MD   Eye Clinic ([facility])    [address]  [address]  [facility] [id number] [phone]              Who to contact     Please call your clinic at [phone] to:    Ask questions about your health    Make or cancel appointments    Discuss your medicines    Learn about your test results    Speak to your doctor            Additional Information About Your Visit        Care EveryWhere ID     This is your Care EveryWhere ID. This  could be used by other organizations to access your [facility] medical records  [id number]         Blood Pressure from Last 3 Encounters:   08/09/18 152/82   07/09/18 (!) 156/98   05/01/18 128/78    Weight from Last 3 Encounters:   08/09/18 66.7 kg (147 lb)   07/09/18 67.1 kg (148 lb)   05/01/18 67.1 kg (148 lb)              We Performed the Following     Color Vision - Screening OU (both eyes)     DILATED FUNDUS EXAM     Drivers License Kinetic & Static OU     IOP Measurement     Refraction        Primary Care Provider Office Phone # Fax #    Alice Bloom [phone] [address] [place] [address]        Equal Access to Services     [facility] : Hadii tyler quiñoneso Sojasen, waaxda luqadaha, qaybta kaalmada adeegyada, lala betts . So [facility] [phone].    ATENCIÓN: Si habla español, tiene a cabezas disposición servicios gratuitos de asistencia lingüística. [facility] [phone].    We comply with applicable federal civil rights laws and [place] laws. We do not discriminate on the basis of race, color, national origin, age, disability, sex, sexual orientation, or gender identity.            Thank you!     Thank you for choosing EYE CLINIC  for your care. Our goal is always to provide you with excellent care. Hearing back from our patients is one way we can continue to improve our services. Please take a few minutes to complete the written survey that you may receive in the mail after your visit with us. Thank you!             Your Updated Medication List - Protect others around you: Learn how to safely use, store and throw away your medicines at www.disposemymeds.org.          This list is accurate as of 10/8/18  4:42 PM.  Always use your most recent med list.                   Brand Name Dispense Instructions for use Diagnosis    amLODIPine 10 MG tablet    NORVASC    90 tablet    Take 1 tablet (10 mg) by mouth At Bedtime    Essential  hypertension       aspirin 81 MG tablet     90 tablet    Take 1 tablet (81 mg) by mouth daily        atorvastatin 40 MG tablet    LIPITOR    90 tablet    Take 1 tablet (40 mg) by mouth daily    Hyperlipidemia LDL goal <100       gabapentin 600 MG tablet    NEURONTIN    405 tablet    2  Tabs BID  times a day    Neuropathy       levothyroxine 88 MCG tablet    SYNTHROID/LEVOTHROID    90 tablet    Take 1 tablet (88 mcg) by mouth daily    Hypothyroidism, unspecified type       lisinopril 40 MG tablet    PRINIVIL/ZESTRIL    30 tablet    Take 1 tablet (40 mg) by mouth daily    Essential hypertension       metFORMIN 1000 MG tablet    GLUCOPHAGE    180 tablet    Take 1 tablet (1,000 mg) by mouth 2 times daily (with meals)    Type 2 diabetes mellitus with other neurologic complication, without long-term current use of insulin (H)       naproxen 500 MG tablet    NAPROSYN    180 tablet    TAKE 1 TABLET(500 MG) BY MOUTH TWICE DAILY WITH MEALS AS NEEDED FOR PAIN    Sciatica, unspecified laterality       omeprazole 20 MG tablet    priLOSEC OTC    180 tablet    Take 1 tablet (20 mg) by mouth 2 times daily (before meals)    Gastroesophageal reflux disease, esophagitis presence not specified       ranitidine 150 MG tablet    ZANTAC    180 tablet    Take 1 tablet (150 mg) by mouth 2 times daily    Gastroesophageal reflux disease, esophagitis presence not specified       ROPINIROLE HYDROCHLORIDE 4 MG Tabs      Take 4 mg by mouth        tacrolimus 0.1 % ointment    PROTOPIC     Apply topically 2 times daily (under right eye)        tiZANidine 2 MG tablet    ZANAFLEX    90 tablet    TAKE 1 TABLET (2 MG) BY MOUTH 3 TIMES DAILY    Muscle spasm       traZODone 50 MG tablet    DESYREL     Take  mg by mouth At Bedtime

## 2018-10-08 NOTE — NURSING NOTE
Chief Complaints and History of Present Illnesses   Patient presents with     Follow Up For     Pt here for an eye health check and f/u for Visual field defect     HPI    Symptoms:     No floaters   No flashes   Redness   Tearing   Dryness         Do you have eye pain now?:  No      Comments:  Pt here for an eye health check and f/u for Visual field defect. Pt states since her last visit in 2015 she has had 56 rounds of radiation. Pt feels vision has gotten worse over the last 6 months. Pt does not redness, tearing, dryness over the last few months. Pt also states she has had some RE pain (like little pins poking her in the eye).     Gabby Aranda, COMT 3:15 PM October 8, 2018

## 2018-10-08 NOTE — PROGRESS NOTES
Assessment & Plan     Susi Aguila is a 73 year old female with the following diagnoses:   1. Homonymous hemianopia, left    2. Dry eye syndrome of both eyes    3. Age-related nuclear cataract of both eyes         Patient is a 72 y/o F here for f/u of left homonymous hemianopia. Last seen in 2015. Reports vision worse in the right eye since havingmeningioma removal by Dr. Ritter and radiation in March. Also notes that she has itching and dry eyes. Last MRI in 7/2018 following resection. Next MRI within the next 3 months. Using Systane or Clear Eyes q1h. Reports diplopia and triplopia for many years. Patient has not driven in 6 years.     Visual acuity 20/100 and 20/25. IOP 21 both eyes. Right APD present. Left homonymous hemianopia present on confrontational fields. Color plates 8.5/11 and 9.5/11. 's license VF with ~90 degrees of field. Slit lamp examination demonstrates increased density of cataracts. Dilated fundus examination demonstrates optic disc pallor in both eyes, seen previously.    In summary,  The patient's homonymous hemianopia remains the same.      The patient also has cataracts both eyes and wishes to pursue Cataract extraction. She understands that this will not make her vision normal.      Incidentally, the patient has Dry eye syndrome causing FBS and is using artificial tears q1h. Patient wishes to proceed with punctal occlusion.           Attending Physician Attestation:  Complete documentation of historical and exam elements from today's encounter can be found in the full encounter summary report (not reduplicated in this progress note).  I personally obtained the chief complaint(s) and history of present illness.  I confirmed and edited as necessary the review of systems, past medical/surgical history, family history, social history, and examination findings as documented by others; and I examined the patient myself.  I personally reviewed the relevant tests, images, and  reports as documented above.  I formulated and edited as necessary the assessment and plan and discussed the findings and management plan with the patient and family. I was present for the entire procedure(s). -  Jayme Fuchs MD  Ophthalmology, PGY-4

## 2018-10-15 ENCOUNTER — TELEPHONE (OUTPATIENT)
Dept: INTERNAL MEDICINE | Facility: CLINIC | Age: 73
End: 2018-10-15

## 2018-10-15 NOTE — TELEPHONE ENCOUNTER
Panel Management Review    Patient Active Problem List   Diagnosis     Essential hypertension     Esophageal reflux     Anxiety state     Stricture and stenosis of esophagus     Cerebral aneurysm     HYPERLIPIDEMIA LDL GOAL <100     ACP (advance care planning)     Sciatica     Tailor's bunion     Dry eye syndrome     Restless leg syndrome     Insomnia     Homonymous bilateral field defects in visual field     Arteriovenous malformation     Mixed incontinence     Hypothyroidism, unspecified type     Proteinuria     Advanced directives, counseling/discussion     Neuropathy     Type 2 diabetes mellitus with other neurologic complication, without long-term current use of insulin (H)     Moderate major depression (H)     Iron deficiency anemia, unspecified iron deficiency anemia type     Hemiparesis affecting left side as late effect of stroke (H)     S/P resection of meningioma       Patient has the following on her problem list:     Depression / Dysthymia review    Measure:  Needs PHQ-9 score of 4 or less during index window.  Administer PHQ-9 and if score is 5 or more, send encounter to provider for next steps.    5 - 7 month window range:       PHQ-9 SCORE 4/25/2016 7/27/2017 1/26/2018   Total Score - - -   Total Score 5 5 9       If PHQ-9 recheck is 5 or more, route to provider for next steps.    Patient is due for:  PHQ9    Diabetes    ASA: Passed    Last A1C  Lab Results   Component Value Date    A1C 6.6 06/15/2018    A1C 6.4 02/08/2018    A1C 6.8 09/28/2017    A1C 6.3 03/09/2017    A1C 6.5 09/06/2016     A1C tested: Passed    Last LDL:    Lab Results   Component Value Date    CHOL 168 06/15/2018     Lab Results   Component Value Date    HDL 67 06/15/2018     Lab Results   Component Value Date    LDL 81 06/15/2018     Lab Results   Component Value Date    TRIG 99 06/15/2018     Lab Results   Component Value Date    CHOLHDLRATIO 2.6 06/29/2015     Lab Results   Component Value Date    NHDL 101 06/15/2018       Is  the patient on a Statin? NO             Is the patient on Aspirin? YES    Medications     HMG CoA Reductase Inhibitors    atorvastatin (LIPITOR) 40 MG tablet    Salicylates    aspirin 81 MG tablet          Last three blood pressure readings:  BP Readings from Last 3 Encounters:   08/09/18 152/82   07/09/18 (!) 156/98   05/01/18 128/78       Date of last diabetes office visit: Patient has new PCP, Dr. Alice Bloom     Tobacco History:     History   Smoking Status     Passive Smoke Exposure - Never Smoker   Smokeless Tobacco     Never Used     Comment: roommates smoke         Hypertension   Last three blood pressure readings:  BP Readings from Last 3 Encounters:   08/09/18 152/82   07/09/18 (!) 156/98   05/01/18 128/78     Blood pressure: MONITOR    HTN Guidelines:  Age 18-59 BP range:  Less than 140/90  Age 60-85 with Diabetes:  Less than 140/90  Age 60-85 without Diabetes:  less than 150/90      Composite cancer screening  Chart review shows that this patient is due/due soon for the following Colonoscopy  Summary:    Patient is due/failing the following:   COLONOSCOPY    Action needed:   Patient needs to see PCP, Alice Stewart    Type of outreach:    No longer PCP    Questions for provider review:    None                                                                                                                                    Lisa Wrad, [facility]

## 2018-10-17 ENCOUNTER — TELEPHONE (OUTPATIENT)
Dept: OPHTHALMOLOGY | Facility: CLINIC | Age: 73
End: 2018-10-17

## 2018-10-17 NOTE — TELEPHONE ENCOUNTER
[facility] Call Center    Phone Message    May a detailed message be left on voicemail: yes    Reason for Call: Other: PT stated she has surgery today and needs to cancel it.  Please follow up with the PT ASAP at the number above.     Action Taken: Message routed to:  [facility] ([facility]

## 2018-11-29 ENCOUNTER — OFFICE VISIT (OUTPATIENT)
Dept: OPHTHALMOLOGY | Facility: CLINIC | Age: 73
End: 2018-11-29
Attending: OPHTHALMOLOGY
Payer: MEDICARE

## 2018-11-29 DIAGNOSIS — E11.49 TYPE 2 DIABETES MELLITUS WITH OTHER NEUROLOGIC COMPLICATION, WITHOUT LONG-TERM CURRENT USE OF INSULIN (H): ICD-10-CM

## 2018-11-29 DIAGNOSIS — Z98.890 S/P RESECTION OF MENINGIOMA: ICD-10-CM

## 2018-11-29 DIAGNOSIS — H25.13 NUCLEAR SCLEROTIC CATARACT OF BOTH EYES: Primary | ICD-10-CM

## 2018-11-29 DIAGNOSIS — H04.123 DRY EYE SYNDROME OF BOTH EYES: ICD-10-CM

## 2018-11-29 DIAGNOSIS — I69.354 HEMIPARESIS AFFECTING LEFT SIDE AS LATE EFFECT OF STROKE (H): ICD-10-CM

## 2018-11-29 DIAGNOSIS — H25.10 SENILE NUCLEAR SCLEROSIS: ICD-10-CM

## 2018-11-29 DIAGNOSIS — H53.462 HOMONYMOUS HEMIANOPIA, LEFT: ICD-10-CM

## 2018-11-29 DIAGNOSIS — Z86.018 S/P RESECTION OF MENINGIOMA: ICD-10-CM

## 2018-11-29 DIAGNOSIS — H25.10 SENILE NUCLEAR SCLEROSIS: Primary | ICD-10-CM

## 2018-11-29 PROCEDURE — 76519 ECHO EXAM OF EYE: CPT | Mod: ZF | Performed by: OPHTHALMOLOGY

## 2018-11-29 PROCEDURE — G0463 HOSPITAL OUTPT CLINIC VISIT: HCPCS | Mod: ZF

## 2018-11-29 ASSESSMENT — REFRACTION_WEARINGRX
OS_AXIS: 165
OD_CYLINDER: +0.75
OD_ADD: +2.50
OS_CYLINDER: +0.75
OD_SPHERE: +0.50
OS_ADD: +2.50
OD_AXIS: 144
OS_SPHERE: +0.75

## 2018-11-29 ASSESSMENT — CONF VISUAL FIELD
OS_SUPERIOR_TEMPORAL_RESTRICTION: 1
OD_INFERIOR_NASAL_RESTRICTION: 1
OD_SUPERIOR_NASAL_RESTRICTION: 1
OS_INFERIOR_TEMPORAL_RESTRICTION: 1
OS_SUPERIOR_NASAL_RESTRICTION: 3

## 2018-11-29 ASSESSMENT — TONOMETRY
IOP_METHOD: TONOPEN
OS_IOP_MMHG: 18
OD_IOP_MMHG: 17

## 2018-11-29 ASSESSMENT — CUP TO DISC RATIO
OD_RATIO: 0.8
OS_RATIO: 0.7

## 2018-11-29 ASSESSMENT — VISUAL ACUITY
CORRECTION_TYPE: GLASSES
OS_BAT_MED: 20/25
OD_BAT_MED: 20/400
METHOD: SNELLEN - LINEAR
OD_CC: 20/150
OS_CC: 20/25

## 2018-11-29 ASSESSMENT — SLIT LAMP EXAM - LIDS
COMMENTS: DERMATOCHALASIS, MGD, BLEPHARITIS
COMMENTS: DERMATOCHALASIS, MGD, BLEPHARITIS

## 2018-11-29 ASSESSMENT — REFRACTION_MANIFEST
OS_AXIS: 150
OD_CYLINDER: +1.25
OS_SPHERE: +0.25
OS_CYLINDER: +0.75
OD_SPHERE: +0.00
OD_AXIS: 155

## 2018-11-29 ASSESSMENT — EXTERNAL EXAM - LEFT EYE: OS_EXAM: NORMAL

## 2018-11-29 NOTE — MR AVS SNAPSHOT
After Visit Summary   11/29/2018    Susi Aguila    MRN: [id number]           Patient Information     Date Of Birth          1945        Visit Information        Provider Department      11/29/2018 9:30 AM [facility]        Today's Diagnoses     Nuclear sclerotic cataract of both eyes    -  1    Homonymous hemianopia, left        Dry eye syndrome of both eyes        Hemiparesis affecting left side as late effect of stroke (H)        S/P resection of meningioma        Type 2 diabetes mellitus with other neurologic complication, without long-term current use of insulin (H)        Senile nuclear sclerosis           Follow-ups after your visit        Your next 10 appointments already scheduled     Dec 12, 2018  9:00 AM CST   Office Visit with Leticia Urbina [facility] ([facility])    [address] [place] [address]  [address]  [facility] [address] [phone]           Bring a current list of meds and any records pertaining to this visit. For Physicals, please bring immunization records and any forms needing to be filled out. Please arrive 10 minutes early to complete paperwork.            Dec 12, 2018  9:40 AM CST   Office Visit with Alice Bloom MD   [facility]    [address] [place] [address]  [address]  [facility] [address] [phone]           Bring a current list of meds and any records pertaining to this visit. For Physicals, please bring immunization records and any forms needing to be filled out. Please arrive 10 minutes early to complete paperwork.              Who to contact     Please call your clinic at [phone] to:    Ask questions about your health    Make or cancel appointments    Discuss your medicines    Learn about your test results    Speak to your doctor            Additional Information About Your Visit        Care EveryWhere ID     This is your Care  EveryWhere ID. This could be used by other organizations to access your [facility] medical records  [id number]         Blood Pressure from Last 3 Encounters:   08/09/18 152/82   07/09/18 (!) 156/98   05/01/18 128/78    Weight from Last 3 Encounters:   08/09/18 66.7 kg (147 lb)   07/09/18 67.1 kg (148 lb)   05/01/18 67.1 kg (148 lb)              We Performed the Following     IOL Biometry w/ IOL calc OU (both eye)     Margarette-Operative Worksheet        Primary Care Provider Office Phone # Fax #    Alice Bloom [phone] [address] [place] [address]        Equal Access to Services     [facility] MELISA : Hadii aad ku hadasho Sojasen, waaxda luqadaha, qaybta kaalmada adeegyada, waxay truptiin rohini betts . [facility] [phone].    ATENCIÓN: Si habla español, tiene a cabezas disposición servicios gratuitos de asistencia lingüística. [facility] [phone].    We comply with applicable federal civil rights laws and [place] laws. We do not discriminate on the basis of race, color, national origin, age, disability, sex, sexual orientation, or gender identity.            Thank you!     Thank you for choosing [facility]  for your care. Our goal is always to provide you with excellent care. Hearing back from our patients is one way we can continue to improve our services. Please take a few minutes to complete the written survey that you may receive in the mail after your visit with us. Thank you!             Your Updated Medication List - Protect others around you: Learn how to safely use, store and throw away your medicines at www.disposemymeds.org.          This list is accurate as of 11/29/18 11:50 AM.  Always use your most recent med list.                   Brand Name Dispense Instructions for use Diagnosis    amLODIPine 10 MG tablet    NORVASC    90 tablet    Take 1 tablet (10 mg) by mouth At Bedtime    Essential hypertension       aspirin 81 MG tablet    ASA    90  tablet    Take 1 tablet (81 mg) by mouth daily        atorvastatin 40 MG tablet    LIPITOR    90 tablet    Take 1 tablet (40 mg) by mouth daily    Hyperlipidemia LDL goal <100       gabapentin 600 MG tablet    NEURONTIN    405 tablet    2  Tabs BID  times a day    Neuropathy       levothyroxine 88 MCG tablet    SYNTHROID/LEVOTHROID    90 tablet    Take 1 tablet (88 mcg) by mouth daily    Hypothyroidism, unspecified type       lisinopril 40 MG tablet    PRINIVIL/ZESTRIL    30 tablet    Take 1 tablet (40 mg) by mouth daily    Essential hypertension       metFORMIN 1000 MG tablet    GLUCOPHAGE    180 tablet    Take 1 tablet (1,000 mg) by mouth 2 times daily (with meals)    Type 2 diabetes mellitus with other neurologic complication, without long-term current use of insulin (H)       naproxen 500 MG tablet    NAPROSYN    180 tablet    TAKE 1 TABLET(500 MG) BY MOUTH TWICE DAILY WITH MEALS AS NEEDED FOR PAIN    Sciatica, unspecified laterality       omeprazole 20 MG EC tablet    priLOSEC OTC    180 tablet    Take 1 tablet (20 mg) by mouth 2 times daily (before meals)    Gastroesophageal reflux disease, esophagitis presence not specified       ranitidine 150 MG tablet    ZANTAC    180 tablet    Take 1 tablet (150 mg) by mouth 2 times daily    Gastroesophageal reflux disease, esophagitis presence not specified       rOPINIRole 4 MG tablet    REQUIP     Take 4 mg by mouth        tacrolimus 0.1 % external ointment    PROTOPIC     Apply topically 2 times daily (under right eye)        tiZANidine 2 MG tablet    ZANAFLEX    90 tablet    TAKE 1 TABLET (2 MG) BY MOUTH 3 TIMES DAILY    Muscle spasm       traZODone 50 MG tablet    DESYREL     Take  mg by mouth At Bedtime

## 2018-11-29 NOTE — NURSING NOTE
Chief Complaints and History of Present Illnesses   Patient presents with     Consult For     Cataracts      HPI    Last Eye Exam:  10/8/18   Additional Referring Providers:  Dr. Jayme Schuler MD [facility]   Affected eye(s):  Both   Symptoms:     Blurred vision   Decreased vision   Glare   Halos      Duration:  3 years   Frequency:  Constant       Do you have eye pain now?:  No      Comments:  Pt sent here at request of Dr. Jayme Schuler MD. Pt has Hx of Cataracts and would like to hear options of surgery in the future. Complains of blurry vision and a lot of glare. On going last 3 years. SHANEL SIDDIQUI, COA 10:19 AM 11/29/2018

## 2018-11-29 NOTE — LETTER
11/29/2018       RE: Susi Aguila  [address]  [facility] [address]     Dear Colleague,    Thank you for referring your patient, Susi Aguila, to the EYE CL[facility] at [facility]. Please see a copy of my visit note below.    Assessment & Plan      Susi Aguila is a 73 year old female with the following diagnoses:   1. Nuclear sclerotic cataract of both eyes    2. Homonymous hemianopia, left    3. Dry eye syndrome of both eyes    4. Hemiparesis affecting left side as late effect of stroke (H)    5. S/P resection of meningioma    6. Type 2 diabetes mellitus with other neurologic complication, without long-term current use of insulin (H)         Referral from Dr. Schuler for progressing cataract  Visually significant both eyes   Risks, benefits and alternatives to cataract extraction/IOL implantation discussed; consent obtained.  Will schedule surgery today    Special equipment/needs:    Anesthesia:Topical  Dilation:Good  Iris expansion:Not needed  Pseudoexfoliation: No pseudoexfoliation  Trypan Blue: No  Right eye first  Goal emmetropia  Dex   Discussed visual limitations due to long standing visual field loss          Attending Physician Attestation:  Complete documentation of historical and exam elements from today's encounter can be found in the full encounter summary report (not reduplicated in this progress note).  I personally obtained the chief complaint(s) and history of present illness.  I confirmed and edited as necessary the review of systems, past medical/surgical history, family history, social history, and examination findings as documented by others; and I examined the patient myself.  I personally reviewed the relevant tests, images, and reports as documented above.  I formulated and edited as necessary the assessment and plan and discussed the findings and management plan with the patient and family. . - Salvador Gerber MD       Again, thank you for  allowing me to participate in the care of your patient.      Sincerely,    Salvador Gerber MD

## 2018-11-29 NOTE — PROGRESS NOTES
Assessment & Plan      Susi Aguila is a 73 year old female with the following diagnoses:   1. Nuclear sclerotic cataract of both eyes    2. Homonymous hemianopia, left    3. Dry eye syndrome of both eyes    4. Hemiparesis affecting left side as late effect of stroke (H)    5. S/P resection of meningioma    6. Type 2 diabetes mellitus with other neurologic complication, without long-term current use of insulin (H)         Referral from Dr. Schuler for progressing cataract  Visually significant both eyes   Risks, benefits and alternatives to cataract extraction/IOL implantation discussed; consent obtained.  Will schedule surgery today    Special equipment/needs:    Anesthesia:Topical  Dilation:Good  Iris expansion:Not needed  Pseudoexfoliation: No pseudoexfoliation  Trypan Blue: No  Right eye first  Goal emmetropia  Dex   Discussed visual limitations due to long standing visual field loss          Attending Physician Attestation:  Complete documentation of historical and exam elements from today's encounter can be found in the full encounter summary report (not reduplicated in this progress note).  I personally obtained the chief complaint(s) and history of present illness.  I confirmed and edited as necessary the review of systems, past medical/surgical history, family history, social history, and examination findings as documented by others; and I examined the patient myself.  I personally reviewed the relevant tests, images, and reports as documented above.  I formulated and edited as necessary the assessment and plan and discussed the findings and management plan with the patient and family. . - Salvador Gerber MD

## 2018-12-04 ENCOUNTER — TELEPHONE (OUTPATIENT)
Dept: OPHTHALMOLOGY | Facility: CLINIC | Age: 73
End: 2018-12-04

## 2018-12-05 ENCOUNTER — HOSPITAL ENCOUNTER (OUTPATIENT)
Facility: AMBULATORY SURGERY CENTER | Age: 73
End: 2018-12-05
Attending: OPHTHALMOLOGY
Payer: COMMERCIAL

## 2018-12-05 DIAGNOSIS — H25.13 NUCLEAR AGE-RELATED CATARACT, BOTH EYES: Primary | ICD-10-CM

## 2018-12-05 DIAGNOSIS — H25.813 COMBINED FORM OF AGE-RELATED CATARACT, BOTH EYES: Primary | ICD-10-CM

## 2018-12-12 ENCOUNTER — OFFICE VISIT (OUTPATIENT)
Dept: PEDIATRICS | Facility: CLINIC | Age: 73
End: 2018-12-12
Payer: MEDICARE

## 2018-12-12 ENCOUNTER — OFFICE VISIT (OUTPATIENT)
Dept: PHARMACY | Facility: CLINIC | Age: 73
End: 2018-12-12

## 2018-12-12 VITALS
DIASTOLIC BLOOD PRESSURE: 82 MMHG | HEART RATE: 73 BPM | TEMPERATURE: 97 F | HEIGHT: 64 IN | OXYGEN SATURATION: 98 % | SYSTOLIC BLOOD PRESSURE: 138 MMHG | BODY MASS INDEX: 25.22 KG/M2

## 2018-12-12 DIAGNOSIS — G47.00 INSOMNIA, UNSPECIFIED TYPE: ICD-10-CM

## 2018-12-12 DIAGNOSIS — I63.9 CEREBROVASCULAR ACCIDENT (CVA), UNSPECIFIED MECHANISM (H): ICD-10-CM

## 2018-12-12 DIAGNOSIS — E11.49 TYPE 2 DIABETES MELLITUS WITH OTHER NEUROLOGIC COMPLICATION, WITHOUT LONG-TERM CURRENT USE OF INSULIN (H): Primary | ICD-10-CM

## 2018-12-12 DIAGNOSIS — N39.46 MIXED STRESS AND URGE URINARY INCONTINENCE: ICD-10-CM

## 2018-12-12 DIAGNOSIS — I10 ESSENTIAL HYPERTENSION: ICD-10-CM

## 2018-12-12 DIAGNOSIS — G25.81 RESTLESS LEG SYNDROME: Primary | ICD-10-CM

## 2018-12-12 DIAGNOSIS — R41.89 IMPAIRED COGNITION: ICD-10-CM

## 2018-12-12 DIAGNOSIS — Z79.899 POLYPHARMACY: ICD-10-CM

## 2018-12-12 DIAGNOSIS — R52 INTERMITTENT PAIN: ICD-10-CM

## 2018-12-12 DIAGNOSIS — Z71.85 VACCINE COUNSELING: ICD-10-CM

## 2018-12-12 DIAGNOSIS — K21.9 GASTROESOPHAGEAL REFLUX DISEASE, ESOPHAGITIS PRESENCE NOT SPECIFIED: ICD-10-CM

## 2018-12-12 DIAGNOSIS — I69.354 HEMIPARESIS AFFECTING LEFT SIDE AS LATE EFFECT OF STROKE (H): ICD-10-CM

## 2018-12-12 DIAGNOSIS — E78.5 HYPERLIPIDEMIA LDL GOAL <100: ICD-10-CM

## 2018-12-12 DIAGNOSIS — D50.9 IRON DEFICIENCY ANEMIA, UNSPECIFIED IRON DEFICIENCY ANEMIA TYPE: ICD-10-CM

## 2018-12-12 DIAGNOSIS — G25.81 RESTLESS LEG SYNDROME: ICD-10-CM

## 2018-12-12 DIAGNOSIS — E11.8 TYPE 2 DIABETES MELLITUS WITH COMPLICATION, WITHOUT LONG-TERM CURRENT USE OF INSULIN (H): ICD-10-CM

## 2018-12-12 LAB
HBA1C MFR BLD: 6.8 % (ref 0–5.6)
HGB BLD-MCNC: 9.5 G/DL (ref 11.7–15.7)

## 2018-12-12 PROCEDURE — G0008 ADMIN INFLUENZA VIRUS VAC: HCPCS | Performed by: INTERNAL MEDICINE

## 2018-12-12 PROCEDURE — 99607 MTMS BY PHARM ADDL 15 MIN: CPT | Performed by: PHARMACIST

## 2018-12-12 PROCEDURE — 99605 MTMS BY PHARM NP 15 MIN: CPT | Performed by: PHARMACIST

## 2018-12-12 PROCEDURE — 80048 BASIC METABOLIC PNL TOTAL CA: CPT | Performed by: INTERNAL MEDICINE

## 2018-12-12 PROCEDURE — 85018 HEMOGLOBIN: CPT | Performed by: INTERNAL MEDICINE

## 2018-12-12 PROCEDURE — 82728 ASSAY OF FERRITIN: CPT | Performed by: INTERNAL MEDICINE

## 2018-12-12 PROCEDURE — 83550 IRON BINDING TEST: CPT | Performed by: INTERNAL MEDICINE

## 2018-12-12 PROCEDURE — 90662 IIV NO PRSV INCREASED AG IM: CPT | Performed by: INTERNAL MEDICINE

## 2018-12-12 PROCEDURE — 83036 HEMOGLOBIN GLYCOSYLATED A1C: CPT | Performed by: INTERNAL MEDICINE

## 2018-12-12 PROCEDURE — 36415 COLL VENOUS BLD VENIPUNCTURE: CPT | Performed by: INTERNAL MEDICINE

## 2018-12-12 PROCEDURE — 99215 OFFICE O/P EST HI 40 MIN: CPT | Mod: 25 | Performed by: INTERNAL MEDICINE

## 2018-12-12 PROCEDURE — 83540 ASSAY OF IRON: CPT | Performed by: INTERNAL MEDICINE

## 2018-12-12 RX ORDER — METFORMIN HCL 500 MG
2000 TABLET, EXTENDED RELEASE 24 HR ORAL
Qty: 360 TABLET | Refills: 0 | Status: SHIPPED | OUTPATIENT
Start: 2018-12-12 | End: 2019-05-13

## 2018-12-12 RX ORDER — OMEPRAZOLE 20 MG/1
20 TABLET, DELAYED RELEASE ORAL
Qty: 180 TABLET | Refills: 3 | Status: SHIPPED | OUTPATIENT
Start: 2018-12-12 | End: 2020-04-15

## 2018-12-12 RX ORDER — LISINOPRIL 40 MG/1
40 TABLET ORAL DAILY
Qty: 30 TABLET | Refills: 1 | Status: SHIPPED | OUTPATIENT
Start: 2018-12-12

## 2018-12-12 NOTE — PROGRESS NOTES
SUBJECTIVE/OBJECTIVE:                           Susi Aguila is a 73 year old female called for a follow-up visit for Medication Therapy Management.  She was referred to me from [facility] recruitment. Co-visit with Dr. Bloom.     Chief Complaint: Follow-up from 10/20/17. 1) gabapentin not effective and cannot tolerate taste, 2) RLS has been worse, 3) reflux is worse (omeprazole is too expensive), 4) tizanidine are different shapes    Allergies/ADRs: Reviewed in Epic  Tobacco: No tobacco use  Alcohol: 1-3 beverages / week  Caffeine: 2-3 cups/day of coffee  Activity: pt is in a wheelchair and stability/legs have been limiting her movement.    Medication Adherence: manages her own medications. PT bring in some medication bottles along with multi-day pill box. Pt was unclear as to when and how many of each medication she has been taking. Visually can see pill distribution is not consistent.     History of Stroke: Current medication is aspirin every day.  Pt use to follow with Neurosurgery - Doug Salmeron, no longer seeing anyone. May schedule an appointment for botox injection for her left hand. No bruising or bleeding.      Hypertension: Current medications include lisinopril 40 mg daily and amlodipine 10 mg daily.  Patient does not self-monitor BP.  Patient reports no current medication side effects.  BP Readings from Last 3 Encounters:   12/12/18 138/82   08/09/18 152/82   07/09/18 (!) 156/98     Diabetes:  Pt currently taking metformin 1,000 mg BID. Pt is not experiencing side effects.  SMBG: never.     Patient is not experiencing hypoglycemia - only if she doesn't eat meals on time  Recent symptoms of high blood sugar? none  Eye exam: up to date  Foot exam: due - checked today by dr. Bloom.  Microalbumin is not < 30 mg/g. Pt is taking an ACEi/ARB.  Aspirin: taking 81 mg and denies side effects.  Diet/Exercise: limited as patient is wheelchair bound    Lab Results   Component Value Date    A1C 6.6 06/15/2018     A1C 6.4 02/08/2018    A1C 6.8 09/28/2017    A1C 6.3 03/09/2017    A1C 6.5 09/06/2016       Hyperlipidemia: Current therapy includes atorvastatin 40 mg once daily.  Pt reports no significant myalgias or other side effects.   Recent Labs   Lab Test 06/15/18  0859 09/28/17  1052  06/29/15  0912 05/05/15  0920   CHOL 168 224*   < > 130 208*   HDL 67 41*   < > 50* 47*   LDL 81 143*   < > 51 118   TRIG 99 201*   < > 145 216*   CHOLHDLRATIO  --   --   --  2.6 4.4    < > = values in this interval not displayed.     GERD: Current medications include: Zantac (ranitidine) 300 mg once daily prescribed and 150 mg OTC in the morning. Rx Prilosec (omeprazole) was too expensive so did not  from pharmacy. Pt c/o heartburn.  Patient feels that current regimen is not effective. Pt has increased NSAID use.      Insomnia/RLS/Pain/cognitive impairment: Current therapy includes trazodone 100 mg nightly, ropinirole 4 mg BID, tizandine 2 md in the evening. She does feel a hangover effect from this. She reports having difficulty sleeping which is most likely RLS. Pt reports her caretaker state she is very groggy in the morning. Also taking gabapentin 600 mg once daily sometimes (cannot tolerate the taste) and naproxen 1000 mg at bedtime for pain/neuropathy. Patient always feels somewhat dizzy. Pt does not think the gabapentin is helpful.  Component      Latest Ref Rng & Units 5/1/2018 6/15/2018   Iron      35 - 180 ug/dL 34 (L) 25 (L)   Iron Binding Cap      240 - 430 ug/dL 352 413   Iron Saturation Index      15 - 46 % 10 (L) 6 (L)       Immunizations: Pt cannot recall when got the flu vaccine this year. She received Prevnar in 4/2016 and pneumovax 2009. Pt has not received Shignrix.  Most Recent Immunizations   Administered Date(s) Administered     Influenza (H1N1) 11/30/2009     Influenza (High Dose) 3 valent vaccine 09/28/2017     Influenza (IIV3) PF 09/26/2012     Pneumo Conj 13-V (2010&after) 04/25/2016     Pneumococcal 23  valent 01/29/2018     TDAP Vaccine (Adacel) 04/09/2012     Zoster vaccine, live 09/26/2012       Current labs include:  BP Readings from Last 3 Encounters:   08/09/18 152/82   07/09/18 (!) 156/98   05/01/18 128/78     Lab Results   Component Value Date    A1C 6.8 09/28/2017   .  Lab Results   Component Value Date    CHOL 224 09/28/2017     Lab Results   Component Value Date    TRIG 201 09/28/2017     Lab Results   Component Value Date    HDL 41 09/28/2017     Lab Results   Component Value Date     09/28/2017       Liver Function Studies -   Recent Labs   Lab Test  09/28/17   1052   PROTTOTAL  7.6   ALBUMIN  3.3*   BILITOTAL  0.4   ALKPHOS  90   AST  9   ALT  14       Lab Results   Component Value Date    UCRR 125 03/08/2018    MICROL 33 03/08/2018    UMALCR 26.72 (H) 03/08/2018       Last Basic Metabolic Panel:  Lab Results   Component Value Date     09/28/2017      Lab Results   Component Value Date    POTASSIUM 4.0 09/28/2017     Lab Results   Component Value Date    CHLORIDE 110 09/28/2017     Lab Results   Component Value Date    BUN 15 09/28/2017     Lab Results   Component Value Date    CR 0.85 09/28/2017     GFR Estimate   Date Value Ref Range Status   05/01/2018 65 >60 mL/min/1.7m2 Final     Comment:     Non  GFR Calc   03/08/2018 62 >60 mL/min/1.7m2 Final     Comment:     Non  GFR Calc   02/11/2018 77 >60 mL/min/1.7m2 Final     Comment:     Non  GFR Calc     GFR Estimate If Black   Date Value Ref Range Status   05/01/2018 78 >60 mL/min/1.7m2 Final     Comment:      GFR Calc   03/08/2018 75 >60 mL/min/1.7m2 Final     Comment:      GFR Calc   02/11/2018 >90 >60 mL/min/1.7m2 Final     Comment:      GFR Calc     TSH   Date Value Ref Range Status   05/01/2018 0.97 0.40 - 4.00 mU/L Final   ]    Most Recent Immunizations   Administered Date(s) Administered     Influenza (H1N1) 11/30/2009     Influenza (High  Dose) 3 valent vaccine 09/28/2017     Influenza (IIV3) PF 09/26/2012     Pneumo Conj 13-V (2010&after) 04/25/2016     Pneumococcal 23 valent 01/29/2018     TDAP Vaccine (Adacel) 04/09/2012     Zoster vaccine, live 09/26/2012         ASSESSMENT:                             Current medications were reviewed today.     Medication Adherence: issues identified and discussed below.    History of Stroke:  Stable.    Hypertension: Stable. Patient is meeting BP goal of < 140/90mmHg.     Diabetes: Needs Improvement. Patient is meeting A1c goal of < 7%. However, to reduce medication confusion and improve adherence will change metformin to XR. Also, encourage pt to try checking blood sugars more.     Hyperlipidemia: Stable. Pt is on high intensity statin which is indicated based on 2013 ACC/AHA guidelines for lipid management.     GERD: Needs Improvemnt.  Current treatment is not effective. Pt is exceeding max ranitidine daily dose, would benefit from splitting to twice daily dosing and stopping OTC Zantac to prevent JAYLIN and better coverage throughout the day. Pt to purchase OTC Prilosec as rx was a cost burden. Also discussed decreasing NSAID use (see below).    Pain/RLS/Insomnia/cognitive impairment: Needs improvement. Pt not experiencing worsening RLS symptoms. Pt would benefit from checking iron and ferritin labs to ensure iron deficiency is not exacerbating symptoms. Pt is on numerous medication that have a high risk of confusion/cns depressant effects. Additionally pt is exceeding RLS max dosing of 4 mg daily. Recommend pt to decrease ropinirole dose to 4 mg daily. Generally recommend gradual reduction in dose, however given situation and that we do not truly know how pt is taking all of her medications may not be able to appropriately taper. Pt would benefit from stopping gabapentin completely as she is not consistently getting in any doses and this is increasing her risk of cognitive impairment. Also, recommend she stop  the tizanidine as it is not effective other than pt using for sleep which is not an appropriate indication for use. Pt may benefit from a sleep referral if RLS do not improve.     Immunizations:   Needs improvement. Pt would benefit from flu vaccine (as pt thought this may have been given in April-May time). Pt would benefit receiving flu shot today given poor clarify if received and no record on [facility]/pt chart. Pt would also benefit from Shingrix vaccine.     PLAN:                            Medication adherence/cognitive Impairment:  1. Dr. Bloom sent a home health nurse referral to assist with medication setup.    RLS/Pain:  1. Pt to take decrease ropinirole to 6 mg daily for 2 weeks. Then to decrease to ropinirole 4 mg daily.  2. Pt to check BMP, hgb, iron and ferritin labs.  3. Pt to stop tizanidine, gabapentin.   4. Pt to decrease use of naproxen to PRN use.  5. Future considerations: sleep referral    GERD:  1. Pt to stop OTC Zantac and take RX ranitidine BID.  2. Pt to purchase OTC Prilosec and take as prescribed.     Metformin:  1. Pt to stop IR metformin and start ER metformin 2000 mg once daily as recommended by Dr. Bloom.    I spent 90 minutes with this patient today. I offer these suggestions for consideration by the PCP. A copy of the visit note was provided to the patient's primary care provider.    Will follow up in 1 months co-visit.     The patient received a summary of these recommendations as an after visit summary.    Chandni Hays PharmD  Pharmaceutical Care Resident  Pager: [phone]    Susi Aguila was seen independently by Dr. Hays. I have reviewed and agree with the resident note and plan of care.      Leticia Urbina PharmD [facility]  Medication Therapy Management Provider  Pager #[phone]

## 2018-12-12 NOTE — PROGRESS NOTES
"  SUBJECTIVE:   Susi Aguila is a 73 year old female who presents to clinic today for the following health issues:    Patient presents for co-visit with MTM. She is concerned that she is taking her medication the wrong way. Patient brought in her pill box but not all of her bottles; it is unclear what medications are in her pill box.    Is taking ropinerole 8mg  at bedtime for her restless leg; she self increased the dose as her RLS symptoms are significantly bothersome.   Taking 1 pill at night of tizanadine due to \"care giver thinks im a closet drunk cause i'm groggy in the morning\". Using 1 dose gabapentin 600 MG at night she cannot tolerate the taste of the medication since they \"melt in my mouth before I get them down\".   Does not think her gabapentin is helping with her pain and tizanidine isn't working well either. Having troubles with filling her medications in  and getting frustrated. Had home health care nurse in the past for medications and states \"I thought she was trying to kill me\".     Had been out of prilosec so she has been taking Zantac over the counter 2 tabs a night and one in the morning.  This is in addition to her current prescription ranitidine.      Has not been checking blood sugars since \"I am not diabetic anymore, it is controlled with diet\". Sugars have been reported stable.         Problem list and histories reviewed & adjusted, as indicated.  Additional history: as documented    Patient Active Problem List   Diagnosis     Essential hypertension     Esophageal reflux     Anxiety state     Stricture and stenosis of esophagus     Cerebral aneurysm     HYPERLIPIDEMIA LDL GOAL <100     ACP (advance care planning)     Sciatica     Tailor's bunion     Dry eye syndrome     Restless leg syndrome     Insomnia     Homonymous bilateral field defects in visual field     Arteriovenous malformation     Mixed incontinence     Hypothyroidism, unspecified type     Proteinuria     Advanced " directives, counseling/discussion     Neuropathy     Type 2 diabetes mellitus with other neurologic complication, without long-term current use of insulin (H)     Moderate major depression (H)     Iron deficiency anemia, unspecified iron deficiency anemia type     Hemiparesis affecting left side as late effect of stroke (H)     S/P resection of meningioma     Past Surgical History:   Procedure Laterality Date     AMPUTATE TOE(S)  9/18/2012    Procedure: AMPUTATE TOE(S);  RIGHT FIFTH TOE AMPUTATION ;  Surgeon: Jayme Garcia DPM;  Location: [facility]     benign meningioma   03/19/2018    benign meningioma removal      BRAIN SURGERY      x's 3     BUNIONECTOMY  9/3/2013    Procedure: BUNIONECTOMY;  RIGHT FOOT SIMPLE LEYDI'S BUNIONECTOMY ;  Surgeon: Jayme Garcia DPM;  Location:  OR     C NONSPECIFIC PROCEDURE  1998;     meningioma     C NONSPECIFIC PROCEDURE  1998    coil procedures, cerebral aneursyms     C NONSPECIFIC PROCEDURE  2001    intra-extracranial aneursym bypass     C NONSPECIFIC PROCEDURE  approx 8189-1157    plastic op/congenital facial abn     C NONSPECIFIC PROCEDURE  approx 1983    ooph     C NONSPECIFIC PROCEDURE  approx 1997    gb     C NONSPECIFIC PROCEDURE  approx 2001    cystocele repair     C NONSPECIFIC PROCEDURE  2005    hardware removal, skull     C NONSPECIFIC PROCEDURE  2006    upper endoscopy; dilatation     C NONSPECIFIC PROCEDURE  2007    neg cystoscopy     C NONSPECIFIC PROCEDURE  2008    cerebral aneursym     C NONSPECIFIC PROCEDURE  2011     Hammertoe reconstruction with exostectomy fifth digit, right foot.     CHOLECYSTECTOMY       COMBINED CYSTOSCOPY, RETROGRADES, URETEROSCOPY, INSERT STENT  1/17/2014    Procedure: COMBINED CYSTOSCOPY, RETROGRADES, URETEROSCOPY, INSERT STENT;  Cystoscopy, Right Retrograde, Right Ureteroscopy;  Surgeon: Db Chase MD;  Location:  OR     COMBINED CYSTOSCOPY, RETROGRADES, URETEROSCOPY, LASER HOLMIUM LITHOTRIPSY URETER(S), INSERT  STENT Left 4/7/2015    Procedure: COMBINED CYSTOSCOPY, RETROGRADES, URETEROSCOPY, LASER HOLMIUM LITHOTRIPSY URETER(S), INSERT STENT;  Surgeon: Db Chase MD;  Location: [facility] OR     CYSTOSCOPY, RETROGRADES, INSERT STENT URETER(S), COMBINED Left 3/2/2015    Procedure: COMBINED CYSTOSCOPY, RETROGRADES, INSERT STENT URETER(S);  Surgeon: Db hCase MD;  Location: [facility] OR     GENITOURINARY SURGERY        COLONOSCOPY THRU STOMA, DIAGNOSTIC  2004    nl colonoscopy; next due 2014     HEAD & NECK SURGERY       HYSTERECTOMY, PAP NO LONGER INDICATED  approx 1975    hyst/oop: ovarian cyst     LITHOTRIPSY  July 21, 2011     NONSPECIFIC PROCEDURE      eswl     OPEN REDUCTION INTERNAL FIXATION HIP BIPOLAR Left 11/23/2015    Procedure: OPEN REDUCTION INTERNAL FIXATION HIP BIPOLAR;  Surgeon: Matheus Og MD;  Location: [facility] OR     ORTHOPEDIC SURGERY         Social History     Tobacco Use     Smoking status: Passive Smoke Exposure - Never Smoker     Smokeless tobacco: Never Used     Tobacco comment: roommates smoke   Substance Use Topics     Alcohol use: No     Alcohol/week: 0.0 oz     Comment: high ball once a month     Family History   Problem Relation Age of Onset     Breast Cancer Mother      Respiratory Mother      Osteoporosis Mother      Alcohol/Drug Father         cirrhosis     Cancer Maternal Grandmother          Current Outpatient Medications   Medication Sig Dispense Refill     amLODIPine (NORVASC) 10 MG tablet Take 1 tablet (10 mg) by mouth At Bedtime 90 tablet 1     aspirin 81 MG tablet Take 1 tablet (81 mg) by mouth daily 90 tablet 3     atorvastatin (LIPITOR) 40 MG tablet Take 1 tablet (40 mg) by mouth daily 90 tablet 3     levothyroxine (SYNTHROID/LEVOTHROID) 88 MCG tablet Take 1 tablet (88 mcg) by mouth daily 90 tablet 3     lisinopril (PRINIVIL/ZESTRIL) 40 MG tablet Take 1 tablet (40 mg) by mouth daily 30 tablet 1     metFORMIN (GLUCOPHAGE-XR) 500 MG 24 hr tablet Take 4 tablets (2,000 mg) by  mouth daily (with breakfast) 360 tablet 0     naproxen (NAPROSYN) 500 MG tablet TAKE 1 TABLET(500 MG) BY MOUTH TWICE DAILY WITH MEALS AS NEEDED FOR PAIN 180 tablet 2     omeprazole (PRILOSEC OTC) 20 MG EC tablet Take 1 tablet (20 mg) by mouth 2 times daily (before meals) 180 tablet 3     ranitidine (ZANTAC) 150 MG tablet Take 1 tablet (150 mg) by mouth 2 times daily 180 tablet 3     ROPINIROLE HYDROCHLORIDE 4 MG TABS Take 4 mg by mouth daily        tacrolimus (PROTOPIC) 0.1 % ointment Apply topically 2 times daily (under right eye)       tiZANidine (ZANAFLEX) 2 MG tablet TAKE 1 TABLET (2 MG) BY MOUTH 3 TIMES DAILY (Patient taking differently: TAKE 1 TABLET (2 MG) BY MOUTH once daily in the evening) 90 tablet 5     traZODone (DESYREL) 50 MG tablet Take  mg by mouth At Bedtime       Allergies   Allergen Reactions     Amoxicillin      itchy     Penicillins Itching     Recent Labs   Lab Test 06/15/18  0859 05/01/18  1140 03/08/18  0953  02/08/18  0811 09/28/17  1052 08/10/17  1225  09/06/16  1050   A1C 6.6*  --   --   --  6.4* 6.8*  --    < > 6.5*   LDL 81  --   --   --   --  143*  --   --  45   HDL 67  --   --   --   --  41*  --   --  50   TRIG 99  --   --   --   --  201*  --   --  259*   ALT  --   --  25  --   --  14 19   < > 28   CR  --  0.86 0.89   < > 0.94 0.85 1.72*   < > 0.72   GFRESTIMATED  --  65 62   < > 58* 66 29*   < > 80   GFRESTBLACK  --  78 75   < > 71 79 35*   < > >90   GFR Calc     POTASSIUM  --  4.2 3.9   < > 4.5 4.0 5.1   < > 4.6   TSH  --  0.97 0.03*  --   --  7.04*  --    < >  --     < > = values in this interval not displayed.      BP Readings from Last 3 Encounters:   12/12/18 138/82   08/09/18 152/82   07/09/18 (!) 156/98    Wt Readings from Last 3 Encounters:   08/09/18 66.7 kg (147 lb)   07/09/18 67.1 kg (148 lb)   05/01/18 67.1 kg (148 lb)                  Labs reviewed in EPIC    Reviewed and updated as needed this visit by clinical staff  Allergies       Reviewed and  "updated as needed this visit by Provider  Allergies         ROS:  Constitutional, HEENT, cardiovascular, pulmonary, GI, , musculoskeletal, neuro, skin, endocrine and psych systems are negative, except as otherwise noted.    This document serves as a record of the services and decisions personally performed and made by Alice Bloom MD. It was created on her behalf by Sapna Hedrick, a trained medical scribe. The creation of this document is based the provider's statements to the medical scribe.    Sapna Hedrick December 12, 2018 10:01 AM  OBJECTIVE:     /82 (BP Location: Right arm, Patient Position: Sitting, Cuff Size: Adult Regular)   Pulse 73   Temp 97  F (36.1  C)   Ht 1.626 m (5' 4.02\")   SpO2 98%   BMI 25.22 kg/m    Body mass index is 25.22 kg/m .  GENERAL: alert and no distress.    HENT: ear canals and TM's normal, nose and mouth without ulcers or lesions  RESP: lungs clear to auscultation - no rales, rhonchi or wheezes  CV: regular rate and rhythm, normal S1 S2, no S3 or S4, no murmur, click or rub  ABDOMEN: soft, nontender, and bowel sounds normal  MS:   1+ edema RLE .  Pt with paralysis of left arm and left leg.  Contracture of left hand noted.  Right sided facial droop.    PSYCH: mentation appears normal, affect normal/bright  Diabetic foot exam: normal DP and PT pulses, normal sensory exam and normal monofilament exam; amputation of R 5th toe     Diagnostic Test Results:  No results found. However, due to the size of the patient record, not all encounters were searched. Please check Results Review for a complete set of results.    ASSESSMENT/PLAN:   9:54-10:42     I spent 50min face to face with patient over 50% in counseling on issues as detailed below.      (E11.49) Type 2 diabetes mellitus with other neurologic complication, without long-term current use of insulin (H)  (primary encounter diagnosis)  -- A1c and glucose pending   -- has previously been on insulin but with diet and " weight loss has recently been controlled with metformin and diet.   -not checking sugars at home, not sure that patient would be physically able to use a glucometer.     Plan: Hemoglobin A1c, Albumin Random Urine         Quantitative with Creat Ratio, metFORMIN         (GLUCOPHAGE-XR) 500 MG 24 hr tablet, Basic         metabolic panel    RLS  -will have patient stop both gabapentin and tizanidine as she does not feel that it helps her RLS and she is taking it inconsistently  -decrease requip back to 4 mg, taking more may have a potentiating effect  -may need to consider consult with sleep medicine to discuss other treatments for RLS  -d/w patient that I'd like to wait to make the decision about a referral until after we get her medications stable.              (I10) Essential hypertension  -- BP at goal, continue to monitor  --as it is unclear if she is taking her medications appropriately there is a risk that her bp will be low when her meds are administered properly.  Will have home nurse monitoring patient as we get her meds adjusted   Plan: lisinopril (PRINIVIL/ZESTRIL) 40 MG tablet         (D50.9) Iron deficiency anemia, unspecified iron deficiency anemia type  -- recheck iron today   -d/w patient that if her iron level is low it cold be worsening her RLS symptoms   -pt states she has a history of needing IV iron infusions  -it is unclear to me why she is anemic, will need to review and see what previous w/u has been performed.    Plan: Hemoglobin, Iron and iron binding capacity,         Ferritin      (I69.354) Hemiparesis affecting left side as late effect of stroke (H)  -- stable; continue to monitor   -this limits ambulation and ability to care for self  -I have some concerns about memory as well as ability to care for self  -home SW order placed to help evaluate for any needs in the home     (K21.9) Gastroesophageal reflux disease, esophagitis presence not specified  -- patient taking zantac BID and  currently out of omeprazole   -- refilled prilosec; advised patient to take either prilosec or zantac   Plan: omeprazole (PRILOSEC OTC) 20 MG EC tablet,         ranitidine (ZANTAC) 150 MG tablet      (Z79.899) Polypharmacy  -- patient unable to keep medications and dosing straight on her own   -- referral placed for home care nurse to assist with administration of medications   -- patient currently living in [place], will see if there is a closer physician for patient to transfer care   --SW consult also placed, concerned about memory and ability to care for herself  Plan: HOME CARE NURSING REFERRAL        Follow up in 1 month or as needed.       The information in this document, created by the medical scribe for me, accurately reflects the services I personally performed and the decisions made by me. I have reviewed and approved this document for accuracy prior to leaving the patient care area.  Alice Bloom MD  [facility]

## 2018-12-12 NOTE — PATIENT INSTRUCTIONS
"Recommendations from today's MTM visit:                                                          1. Stop the over-the-counter Zantac. This is the same as the ranitidine. Take the prescribed ranitidine 1 tablet at \"noon\" and \"evening\" medication slots.     2. If the prescription omeprazole is too expensive, you buy this over-the-counter. It is called Prilosec (omeprazole).     3. Please get your labs done today.      4. Please go to the pharmacy to get the Shingrix vaccine. After the first dose, you will need to return to get the second dose 2-6 months later.      5. Stop taking the gabapentin and tizanidine since this is not helpful. You can bring unwanted medication to the police station or mix the medications with coffee grounds and they can go in the trash.     6. Only take the ropinirole 4 mg once daily at bedtime.      7. We will update your medication list and have a home health nurse help you set up your medications.        8. You may need to see a sleep specialist to help better manage the restless legs overnight.      9. Dr. Bloom sent a new prescription of metformin. This is an extended release form of the same medication. You can take all of the metformin extended release one time daily.     10. Please bring in all of your medications at our next visit.     11. Dr. Bloom will look into providers that may be closer to your area.      12. Flu shot today in clinic.      13. Cut back on the naproxen. This can be hard on the kidneys and worsen your reflux.      Next MTM visit: 1 month as a co-visit with Dr. Bloom and Pharmacist.     To schedule another MTM appointment, please call the clinic directly or you may call the MTM scheduling line at [phone] or toll-free at [phone].      My Clinical Pharmacist's contact information:                                                       It was a pleasure talking with you today!  Please feel free to contact me with any questions or concerns you have. "      Chandni Hays, PharmD  Pharmaceutical Care Resident  Pager: [phone]     Dr. Bloom     You may receive a survey about the MTM services you received.  I would appreciate your feedback to help me serve you better in the future. Please fill it out and return it when you can. Your comments will be anonymous.

## 2018-12-12 NOTE — PROGRESS NOTES
SUBJECTIVE/OBJECTIVE:                           Susi Aguila is a 73 year old female called for a follow-up visit for Medication Therapy Management.  She was referred to me from [facility] recruitment. Co-visit with Dr. Bloom.     Chief Complaint: Follow-up from 10/20/17. 1) gabapentin not effective and cannot tolerate taste, 2) RLS has been worse, 3) reflux is worse (omeprazole is too expensive), 4) tizanidine are different shapes    Allergies/ADRs: Reviewed in Epic  Tobacco: No tobacco use  Alcohol: 1-3 beverages / week  Caffeine: 2-3 cups/day of coffee  Activity: pt is in a wheelchair and stability/legs have been limiting her movement.    Medication Adherence: manages her own medications. PT bring in some medication bottles along with multi-day pill box. Pt was unclear as to when and how many of each medication she has been taking. Visually can see pill distribution is not consistent.     History of Stroke: Current medication is aspirin every day.  Pt use to follow with Neurosurgery - Doug Salmeron, no longer seeing anyone. May schedule an appointment for botox injection for her left hand. No bruising or bleeding.      Hypertension: Current medications include lisinopril 40 mg daily and amlodipine 10 mg daily.  Patient does not self-monitor BP.  Patient reports no current medication side effects.  BP Readings from Last 3 Encounters:   12/12/18 138/82   08/09/18 152/82   07/09/18 (!) 156/98     Diabetes:  Pt currently taking metformin 1,000 mg BID. Pt is not experiencing side effects.  SMBG: never.     Patient is not experiencing hypoglycemia - only if she doesn't eat meals on time  Recent symptoms of high blood sugar? none  Eye exam: up to date  Foot exam: due - checked today by dr. Bloom.  Microalbumin is not < 30 mg/g. Pt is taking an ACEi/ARB.  Aspirin: taking 81 mg and denies side effects.  Diet/Exercise: limited as patient is wheelchair bound    Lab Results   Component Value Date    A1C 6.6 06/15/2018     A1C 6.4 02/08/2018    A1C 6.8 09/28/2017    A1C 6.3 03/09/2017    A1C 6.5 09/06/2016       Hyperlipidemia: Current therapy includes atorvastatin 40 mg once daily.  Pt reports no significant myalgias or other side effects.   Recent Labs   Lab Test 06/15/18  0859 09/28/17  1052  06/29/15  0912 05/05/15  0920   CHOL 168 224*   < > 130 208*   HDL 67 41*   < > 50* 47*   LDL 81 143*   < > 51 118   TRIG 99 201*   < > 145 216*   CHOLHDLRATIO  --   --   --  2.6 4.4    < > = values in this interval not displayed.     GERD: Current medications include: Zantac (ranitidine) 300 mg once daily prescribed and 150 mg OTC in the morning. Rx Prilosec (omeprazole) was too expensive so did not  from pharmacy. Pt c/o heartburn.  Patient feels that current regimen is not effective. Pt has increased NSAID use.      Insomnia/RLS/Pain/cognitive impairment: Current therapy includes trazodone 100 mg nightly, ropinirole 4 mg BID, tizandine 2 md in the evening. She does feel a hangover effect from this. She reports having difficulty sleeping which is most likely RLS. Pt reports her caretaker state she is very groggy in the morning. Also taking gabapentin 600 mg once daily sometimes (cannot tolerate the taste) and naproxen 1000 mg at bedtime for pain/neuropathy. Patient always feels somewhat dizzy. Pt does not think the gabapentin is helpful.  Component      Latest Ref Rng & Units 5/1/2018 6/15/2018   Iron      35 - 180 ug/dL 34 (L) 25 (L)   Iron Binding Cap      240 - 430 ug/dL 352 413   Iron Saturation Index      15 - 46 % 10 (L) 6 (L)       Immunizations: Pt cannot recall when got the flu vaccine this year. She received Prevnar in 4/2016 and pneumovax 2009. Pt has not received Shignrix.  Most Recent Immunizations   Administered Date(s) Administered     Influenza (H1N1) 11/30/2009     Influenza (High Dose) 3 valent vaccine 09/28/2017     Influenza (IIV3) PF 09/26/2012     Pneumo Conj 13-V (2010&after) 04/25/2016     Pneumococcal 23  valent 01/29/2018     TDAP Vaccine (Adacel) 04/09/2012     Zoster vaccine, live 09/26/2012       Current labs include:  BP Readings from Last 3 Encounters:   08/09/18 152/82   07/09/18 (!) 156/98   05/01/18 128/78     Lab Results   Component Value Date    A1C 6.8 09/28/2017   .  Lab Results   Component Value Date    CHOL 224 09/28/2017     Lab Results   Component Value Date    TRIG 201 09/28/2017     Lab Results   Component Value Date    HDL 41 09/28/2017     Lab Results   Component Value Date     09/28/2017       Liver Function Studies -   Recent Labs   Lab Test  09/28/17   1052   PROTTOTAL  7.6   ALBUMIN  3.3*   BILITOTAL  0.4   ALKPHOS  90   AST  9   ALT  14       Lab Results   Component Value Date    UCRR 125 03/08/2018    MICROL 33 03/08/2018    UMALCR 26.72 (H) 03/08/2018       Last Basic Metabolic Panel:  Lab Results   Component Value Date     09/28/2017      Lab Results   Component Value Date    POTASSIUM 4.0 09/28/2017     Lab Results   Component Value Date    CHLORIDE 110 09/28/2017     Lab Results   Component Value Date    BUN 15 09/28/2017     Lab Results   Component Value Date    CR 0.85 09/28/2017     GFR Estimate   Date Value Ref Range Status   05/01/2018 65 >60 mL/min/1.7m2 Final     Comment:     Non  GFR Calc   03/08/2018 62 >60 mL/min/1.7m2 Final     Comment:     Non  GFR Calc   02/11/2018 77 >60 mL/min/1.7m2 Final     Comment:     Non  GFR Calc     GFR Estimate If Black   Date Value Ref Range Status   05/01/2018 78 >60 mL/min/1.7m2 Final     Comment:      GFR Calc   03/08/2018 75 >60 mL/min/1.7m2 Final     Comment:      GFR Calc   02/11/2018 >90 >60 mL/min/1.7m2 Final     Comment:      GFR Calc     TSH   Date Value Ref Range Status   05/01/2018 0.97 0.40 - 4.00 mU/L Final   ]    Most Recent Immunizations   Administered Date(s) Administered     Influenza (H1N1) 11/30/2009     Influenza (High  Dose) 3 valent vaccine 09/28/2017     Influenza (IIV3) PF 09/26/2012     Pneumo Conj 13-V (2010&after) 04/25/2016     Pneumococcal 23 valent 01/29/2018     TDAP Vaccine (Adacel) 04/09/2012     Zoster vaccine, live 09/26/2012         ASSESSMENT:                             Current medications were reviewed today.     Medication Adherence: issues identified and discussed below.    History of Stroke:  Stable.    Hypertension: Stable. Patient is meeting BP goal of < 140/90mmHg.     Diabetes: Needs Improvement. Patient is meeting A1c goal of < 7%. However, to reduce medication confusion and improve adherence will change metformin to XR. Also, encourage pt to try checking blood sugars more.     Hyperlipidemia: Stable. Pt is on high intensity statin which is indicated based on 2013 ACC/AHA guidelines for lipid management.     GERD: Needs Improvemnt.  Current treatment is not effective. Pt is exceeding max ranitidine daily dose, would benefit from splitting to twice daily dosing and stopping OTC Zantac to prevent JAYLIN and better coverage throughout the day. Pt to purchase OTC Prilosec as rx was a cost burden. Also discussed decreasing NSAID use (see below).    Pain/RLS/Insomnia/cognitive impairment: Needs improvement. Pt not experiencing worsening RLS symptoms. Pt would benefit from checking iron and ferritin labs to ensure iron deficiency is not exacerbating symptoms. Pt is on numerous medication that have a high risk of confusion/cns depressant effects. Additionally pt is exceeding RLS max dosing of 4 mg daily. Recommend pt to decrease ropinirole dose to 4 mg daily. Generally recommend gradual reduction in dose, however given situation and that we do not truly know how pt is taking all of her medications may not be able to appropriately taper. Pt would benefit from stopping gabapentin completely as she is not consistently getting in any doses and this is increasing her risk of cognitive impairment. Also, recommend she stop  the tizanidine as it is not effective other than pt using for sleep which is not an appropriate indication for use. Pt may benefit from a sleep referral if RLS do not improve.     Immunizations:   Needs improvement. Pt would benefit from flu vaccine (as pt thought this may have been given in April-May time). Pt would benefit receiving flu shot today given poor clarify if received and no record on [facility]/pt chart. Pt would also benefit from Shingrix vaccine.     PLAN:                            Medication adherence/cognitive Impairment:  1. Dr. Bloom sent a home health nurse referral to assist with medication setup.    RLS/Pain:  1. Pt to take decrease ropinirole to 6 mg daily for 2 weeks. Then to decrease to ropinirole 4 mg daily.  2. Pt to check BMP, hgb, iron and ferritin labs.  3. Pt to stop tizanidine, gabapentin.   4. Pt to decrease use of naproxen to PRN use.  5. Future considerations: sleep referral    GERD:  1. Pt to stop OTC Zantac and take RX ranitidine BID.  2. Pt to purchase OTC Prilosec and take as prescribed.     Metformin:  1. Pt to stop IR metformin and start ER metformin 2000 mg once daily as recommended by Dr. Bloom.    I spent 90 minutes with this patient today. I offer these suggestions for consideration by the PCP. A copy of the visit note was provided to the patient's primary care provider.    Will follow up in 1 months co-visit.     The patient received a summary of these recommendations as an after visit summary.    Chandni Hays PharmD  Pharmaceutical Care Resident  Pager: [phone]    Susi Aguila was seen independently by Dr. Hays. I have reviewed and agree with the resident note and plan of care.      Leticia Urbina PharmD [facility]  Medication Therapy Management Provider  Pager #[phone]

## 2018-12-13 LAB
ANION GAP SERPL CALCULATED.3IONS-SCNC: 10 MMOL/L (ref 3–14)
BUN SERPL-MCNC: 16 MG/DL (ref 7–30)
CALCIUM SERPL-MCNC: 9 MG/DL (ref 8.5–10.1)
CHLORIDE SERPL-SCNC: 107 MMOL/L (ref 94–109)
CO2 SERPL-SCNC: 21 MMOL/L (ref 20–32)
CREAT SERPL-MCNC: 0.99 MG/DL (ref 0.52–1.04)
FERRITIN SERPL-MCNC: 4 NG/ML (ref 8–252)
GFR SERPL CREATININE-BSD FRML MDRD: 55 ML/MIN/1.7M2
GLUCOSE SERPL-MCNC: 99 MG/DL (ref 70–99)
IRON SATN MFR SERPL: 9 % (ref 15–46)
IRON SERPL-MCNC: 38 UG/DL (ref 35–180)
POTASSIUM SERPL-SCNC: 4 MMOL/L (ref 3.4–5.3)
SODIUM SERPL-SCNC: 138 MMOL/L (ref 133–144)
TIBC SERPL-MCNC: 435 UG/DL (ref 240–430)

## 2018-12-13 RX ORDER — ROPINIROLE 4 MG/1
TABLET, FILM COATED ORAL
Qty: 30 TABLET | Refills: 1
Start: 2018-12-13 | End: 2019-02-08

## 2018-12-13 NOTE — PATIENT INSTRUCTIONS
"Recommendations from today's MTM visit:                                                          1. Stop the over-the-counter Zantac. This is the same as the ranitidine. Take the prescribed ranitidine 1 tablet at \"noon\" and \"evening\" medication slots.     2. If the prescription omeprazole is too expensive, you buy this over-the-counter. It is called Prilosec (omeprazole).     3. Please get your labs done today.      4. Please go to the pharmacy to get the Shingrix vaccine. After the first dose, you will need to return to get the second dose 2-6 months later.      5. Stop taking the gabapentin and tizanidine since this is not helpful. You can bring unwanted medication to the police station or mix the medications with coffee grounds and they can go in the trash.     6. Only take the ropinirole 6 mg once daily at bedtime x2 weeks, then 4 mg daily.      7. We will update your medication list and have a home health nurse help you set up your medications.        8. You may need to see a sleep specialist to help better manage the restless legs overnight.      9. Dr. Bloom sent a new prescription of metformin. This is an extended release form of the same medication. You can take all of the metformin extended release one time daily.     10. Please bring in all of your medications at our next visit.     11. Dr. Bloom will look into providers that may be closer to your area.      12. Flu shot today in clinic.      13. Cut back on the naproxen. This can be hard on the kidneys and worsen your reflux.      Next MTM visit: 1 month as a co-visit with Dr. Bloom and Pharmacist.     To schedule another MTM appointment, please call the clinic directly or you may call the MTM scheduling line at [phone] or toll-free at [phone].      My Clinical Pharmacist's contact information:                                                       It was a pleasure talking with you today!  Please feel free to contact me with any " questions or concerns you have.      Chandni Hays, Luis  Pharmaceutical Care Resident  Pager: [phone]     Dr. Bloom     You may receive a survey about the MTM services you received.  I would appreciate your feedback to help me serve you better in the future. Please fill it out and return it when you can. Your comments will be anonymous.

## 2018-12-16 ENCOUNTER — TELEPHONE (OUTPATIENT)
Dept: PEDIATRICS | Facility: CLINIC | Age: 73
End: 2018-12-16

## 2018-12-16 DIAGNOSIS — I69.354 HEMIPARESIS AFFECTING LEFT SIDE AS LATE EFFECT OF STROKE (H): Primary | ICD-10-CM

## 2018-12-16 NOTE — TELEPHONE ENCOUNTER
I placed a home care order for this patient on 12//12 and it doesn't appear that anyone has contacted her.  Would you please call homecare and see whats going on?  Thanks!  Alice Bloom MD
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
## 2018-12-17 ENCOUNTER — PATIENT OUTREACH (OUTPATIENT)
Dept: CARE COORDINATION | Facility: CLINIC | Age: 73
End: 2018-12-17
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
## 2018-12-19 ENCOUNTER — TELEPHONE (OUTPATIENT)
Dept: PEDIATRICS | Facility: CLINIC | Age: 73
End: 2018-12-19

## 2018-12-19 NOTE — TELEPHONE ENCOUNTER
Reason for Call: Request for an order or referral: ordered for home care just need it updated by Dr. Bloom    Order or referral being requested: home care referral    Date needed: as soon as possible    Has the patient been seen by the PCP for this problem? YES    Additional comments: iF[facility] Fenton would like a call back [phone]    Phone number Patient can be reached at:  Home number on file [phone] (home)    Best Time:  asap    Can we leave a detailed message on this number?  YES    Call taken on 2018 at 11:45 AM by Drea Griggs
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
## 2018-12-20 NOTE — TELEPHONE ENCOUNTER
I spoke with [facility]-home care order is valid for five days.  Just needs to renew the order for home care that was placed on 12/12/18.  I gave verbal order for home care assessment-RN is scheduled to see her today.  Patient has just moved to [place].    [facility] states that they have made attempts to schedule a home care visit with patient.  She deferred an appointment with them prior to moving to [place].  She tried to cancel today's appointment, but the nurse encouraged her to keep this appointment and she agreed with this.  No further questions.  Will call back if any other questions or concerns.  TING Chavez RN

## 2018-12-21 NOTE — PROGRESS NOTES
Clinic Care Coordination Contact    Situation: Patient chart reviewed by care coordinator.    Background: Concerns about Home Safety.    Assessment:  Pt was opened to [facility] on 12.20.18 after being referred by PCP for confusion with meds. Home Care Assessment Summary...    Pt just moved to new apartment in [place], pt does not have bed or furniture yet besides a rocking chair and tv. Has been sleeping in chair or on floor currently.     Pt noted to have significant urinary incontinence all the time, no skin breakdown note on buttocks or jose area, using depends. Educated on changing regulary, especially when wet and to clean and dry skin well to prevent skin breakdown,  few episodes of bowel incontinence lately, does take miralax to prevent constipation, but will stop when she has a BM then gets constipated again. [facility] SN educated on taking daily to prevent constipation and to hold for loose stools.     SOB with max exertion only, dry non productive cough noted, lungs cta bilat.      Med rec completed, meds set up x 1 week, pt did not have omeprazole, i/s to pick this up so this can be filled along with new script of metformin and atorvastatin as ordered. Educated on stopping gabapentin and tizanidine as ordered by PCP and to dispose of unused meds that are mixed together.     L sided weakness requiring assistance with most ADLs and IADLs to be safe. Reports depression and anxiety r/t current move and car repairs requiring a lot of money to fix and not being able to buy [employer] presents this year.      Pt lives in apartment with stairs and elevator, carpet in home making it hard for pt to get around in w/c. Pt's PCA and his wife live with pt and provide ADL/IADL assistance as needed.      RECOMMENDATIONs..SN for med mgmt/education, pain mgmt/education, safety education, CVP assessment, DM mgmt/education, nutrition/hydration, GI/, PU prevention education.PT for gait training, home safety, HEP and strengthening.  OT for ADL/IADL safety, HEP, strengthening, transfer training. SW for community resources, health care directive planning, memory and home safety concerns.    Plan/Recommendations: Pt will most likely need to transition care to [facility]. Requested home care assistance with finding a closer provider. CLAUDETTE CC will follow-up in 4 weeks.    Talita Henry SONAL  Clinic Care Coordinator  [phone]  [email or web address]

## 2019-01-07 ENCOUNTER — TELEPHONE (OUTPATIENT)
Dept: PEDIATRICS | Facility: CLINIC | Age: 74
End: 2019-01-07

## 2019-01-07 PROBLEM — N18.30 CKD (CHRONIC KIDNEY DISEASE) STAGE 3, GFR 30-59 ML/MIN (H): Status: ACTIVE | Noted: 2019-01-07

## 2019-01-07 NOTE — TELEPHONE ENCOUNTER
Reason for Call:  Home Health Care    Verna with [facility] called regarding (reason for call): Discontinue orders. She states that homecare nursing team has only been out to see the patient 3 x's, once on 12/20, 12/26 and 12/28. Patient does not want Physical therapy or OT. Patient doesn't think it would be helpful because the last time she had OT they just did cognitive testing. Patient has a PCA and states that the PCA can manage her health needs. Please call Verna with questions (#[phone]).        Phone Number Homecare Nurse can be reached at: [phone]    Can we leave a detailed message on this number? YES      Best Time: any    Call taken on 1/7/2019 at 5:10 PM by Iva Martin

## 2019-01-08 ENCOUNTER — PATIENT OUTREACH (OUTPATIENT)
Dept: CARE COORDINATION | Facility: CLINIC | Age: 74
End: 2019-01-08

## 2019-01-08 NOTE — PROGRESS NOTES
Clinic Care Coordination Contact  [facility]/Voicemail       Clinical Data: Care Coordinator Outreach  Outreach attempted x 1.  Left message on voicemail with call back information and requested return call.  Plan:  Care Coordinator will try to meet with pt in clinic 1.9.18 during re-scheduled pre-op.     GALINA Grace  Clinic Care Coordinator  [phone]  [email or web address]

## 2019-01-08 NOTE — PROGRESS NOTES
Clinic Care Coordination Contact  Care Team Conversations    Message received from patient's PCP reporting that patient had refused home care services. Per chart review, patient was seen by home care nursing team on 12/20/18, 12/26/18, and 12/28/18. Patient stated that she no longer needs home care services as she has a PCA that she feels can manage her health needs.     Patient was a NO SHOW to PCP on 1/7/19 for a pre-op appointment. Patient was rescheduled for 1/9/19 with a resident.     Note being routed to patient's current SWCC, GALINA Grace, to assist with any future needs.     CHARLIE Zuluaga   Clinic Care Coordinator  [phone]  [email or web address]

## 2019-01-09 ENCOUNTER — OFFICE VISIT (OUTPATIENT)
Dept: PEDIATRICS | Facility: CLINIC | Age: 74
End: 2019-01-09
Payer: COMMERCIAL

## 2019-01-09 ENCOUNTER — PATIENT OUTREACH (OUTPATIENT)
Dept: CARE COORDINATION | Facility: CLINIC | Age: 74
End: 2019-01-09

## 2019-01-09 ENCOUNTER — APPOINTMENT (OUTPATIENT)
Dept: LAB | Facility: CLINIC | Age: 74
End: 2019-01-09
Payer: COMMERCIAL

## 2019-01-09 VITALS
SYSTOLIC BLOOD PRESSURE: 146 MMHG | BODY MASS INDEX: 27.16 KG/M2 | HEART RATE: 79 BPM | OXYGEN SATURATION: 95 % | WEIGHT: 158.3 LBS | TEMPERATURE: 98.8 F | DIASTOLIC BLOOD PRESSURE: 76 MMHG

## 2019-01-09 DIAGNOSIS — Z01.818 PREOP GENERAL PHYSICAL EXAM: Primary | ICD-10-CM

## 2019-01-09 DIAGNOSIS — T74.31XA: ICD-10-CM

## 2019-01-09 DIAGNOSIS — H25.9 AGE-RELATED CATARACT OF BOTH EYES, UNSPECIFIED AGE-RELATED CATARACT TYPE: ICD-10-CM

## 2019-01-09 PROCEDURE — 99214 OFFICE O/P EST MOD 30 MIN: CPT | Mod: GC | Performed by: STUDENT IN AN ORGANIZED HEALTH CARE EDUCATION/TRAINING PROGRAM
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
## 2019-01-09 NOTE — PATIENT INSTRUCTIONS
1. Please hold lisinopril and metformin 24 hrs prior to surgery  2. Otherwise cleared for surgery    Before Your Surgery      Call your surgeon if there is any change in your health. This includes signs of a cold or flu (such as a sore throat, runny nose, cough, rash or fever).    Do not smoke, drink alcohol or take over the counter medicine (unless your surgeon or primary care doctor tells you to) for the 24 hours before and after surgery.    If you take prescribed drugs: Follow your doctor s orders about which medicines to take and which to stop until after surgery.    Eating and drinking prior to surgery: follow the instructions from your surgeon    Take a shower or bath the night before surgery. Use the soap your surgeon gave you to gently clean your skin. If you do not have soap from your surgeon, use your regular soap. Do not shave or scrub the surgery site.  Wear clean pajamas and have clean sheets on your bed.   Before Your Surgery    Call your surgeon if there is any change in your health. This includes signs of a cold or flu (such as a sore throat, runny nose, cough, rash or fever).  Do not smoke, drink alcohol or take over the counter medicine (unless your surgeon or primary care doctor tells you to) for the 24 hours before and after surgery.  If you take prescribed drugs: Follow your doctor s orders about which medicines to take and which to stop until after surgery.  Eating and drinking prior to surgery: follow the instructions from your surgeon  Take a shower or bath the night before surgery. Use the soap your surgeon gave you to gently clean your skin. If you do not have soap from your surgeon, use your regular soap. Do not shave or scrub the surgery site.  Wear clean pajamas and have clean sheets on your bed.

## 2019-01-09 NOTE — LETTER
Health Care Home - Access Care Plan    About Me  Patient Name:  Susi Aguila    YOB: 1945  Age:                             73 year old   [place] MRN:            [id number] Telephone Information:   Home Phone [phone]   Mobile [phone]       Address:    [address]  [facility] [address] Email address:  No e-mail address on record      Emergency Contact(s)  Name Relationship Lgl Grd Work Phone Home Phone Mobile Phone   1. PORFIRIO STEWART Other No  [phone]   2. SHERIN BLANK Sister No  [phone]   3. RICKY FRAGOSO Son   [phone]             Health Maintenance: Routine Health maintenance Reviewed: Due/Overdue     My Access Plan  Medical Emergency 911   Questions or concerns during clinic hours Primary Clinic Line, I will call the clinic directly: [facility] [phone]   24 Hour Appointment Line [phone] or  [phone] [phone]) (toll free)   24 Hour Nurse Line [phone] (toll free)   Questions or concerns outside clinic hours 24 Hour Appointment Line, I will call the after-hours on-call line:   [place] [phone] or [phone]) (toll-free)   Preferred Urgent Care [facility] [place], [phone]   Preferred Hospital [facility]  [phone]   Preferred Pharmacy [id number] - [place], [place] - [address] AT [facility]     Behavioral Health Crisis Line The National Suicide Prevention Lifeline at 1-550.458.2778 or 91     My Care Team Members  Patient Care Team       Relationship Specialty Notifications Start End    Alice Bloom MD PCP - General Internal Medicine  8/9/18     Phone: [phone] Fax: [phone] [address] [place] [address]    Logan Briones MD PCP - Assigned PCP   4/30/14     Phone: [phone] Fax: [phone] [address]  [facility] [address]    Db Chase MD MD Urology  3/23/15     Phone: [phone] Fax: [phone] [address] [facility] [address]    Jayme Schuler MD MD Ophthalmology  7/9/15     Phone: [phone] Fax: [phone]         [address] [facility] [address]    Logan Brinoes MD MD Internal Medicine  7/9/15     Phone: [phone] Fax: [phone]         [address] [facility] [address]    Ruby Rodriguez MD MD Urology  8/10/16     Phone: [phone] Fax: [phone]         [address] [facility] [address] [facility] [address]    [facility] ([facility])  12/13/18     Fax: [phone]          [address] [place] [address]    Talita Henry, [facility] Care Coordinator Primary Care - [facility]  12/17/18     Phone: [phone] Fax: [phone]               My Medical and Care Information  Problem List   Patient Active Problem List   Diagnosis     Essential hypertension     Esophageal reflux     Anxiety state     Stricture and stenosis of esophagus     Cerebral aneurysm     HYPERLIPIDEMIA LDL GOAL <100     ACP (advance care planning)     Sciatica     Tailor's bunion     Dry eye syndrome     Restless leg syndrome     Insomnia     Homonymous bilateral field defects in visual field     Arteriovenous malformation     Mixed incontinence     Hypothyroidism, unspecified type     Proteinuria     Advanced directives, counseling/discussion     Neuropathy     Type 2 diabetes mellitus with other neurologic complication, without long-term current use of insulin (H)     Moderate major depression (H)     Iron deficiency anemia, unspecified iron deficiency anemia type     Hemiparesis affecting left side as late effect of stroke (H)     S/P resection of meningioma     CKD (chronic kidney disease) stage 3, GFR 30-59 ml/min (H)      Current Medications and Allergies:  See printed Medication Report

## 2019-01-09 NOTE — Clinical Note
Kathrine forgot to remind you about this one, but when she is finished please sign and route back to pool :)

## 2019-01-10 ASSESSMENT — ACTIVITIES OF DAILY LIVING (ADL): DEPENDENT_IADLS:: CLEANING;COOKING;LAUNDRY;SHOPPING;TRANSPORTATION;INCONTINENCE
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
## 2019-01-11 RX ORDER — SODIUM CHLORIDE, SODIUM LACTATE, POTASSIUM CHLORIDE, CALCIUM CHLORIDE 600; 310; 30; 20 MG/100ML; MG/100ML; MG/100ML; MG/100ML
INJECTION, SOLUTION INTRAVENOUS CONTINUOUS
Status: CANCELLED | OUTPATIENT
Start: 2019-01-11

## 2019-01-11 RX ORDER — FENTANYL CITRATE 50 UG/ML
25-50 INJECTION, SOLUTION INTRAMUSCULAR; INTRAVENOUS
Status: CANCELLED | OUTPATIENT
Start: 2019-01-11

## 2019-01-11 RX ORDER — HYDRALAZINE HYDROCHLORIDE 20 MG/ML
2.5-5 INJECTION INTRAMUSCULAR; INTRAVENOUS EVERY 10 MIN PRN
Status: CANCELLED | OUTPATIENT
Start: 2019-01-11

## 2019-01-11 RX ORDER — ACETAMINOPHEN 325 MG/1
975 TABLET ORAL ONCE
Status: CANCELLED | OUTPATIENT
Start: 2019-01-11 | End: 2019-01-11

## 2019-01-11 RX ORDER — CYCLOPENTOLATE HYDROCHLORIDE 10 MG/ML
1 SOLUTION/ DROPS OPHTHALMIC
Status: CANCELLED | OUTPATIENT
Start: 2019-01-11

## 2019-01-11 RX ORDER — MEPERIDINE HYDROCHLORIDE 25 MG/ML
12.5 INJECTION INTRAMUSCULAR; INTRAVENOUS; SUBCUTANEOUS
Status: CANCELLED | OUTPATIENT
Start: 2019-01-11

## 2019-01-11 RX ORDER — OXYCODONE HYDROCHLORIDE 5 MG/1
5 TABLET ORAL EVERY 4 HOURS PRN
Status: CANCELLED | OUTPATIENT
Start: 2019-01-11

## 2019-01-11 RX ORDER — PROPARACAINE HYDROCHLORIDE 5 MG/ML
1 SOLUTION/ DROPS OPHTHALMIC ONCE
Status: CANCELLED | OUTPATIENT
Start: 2019-01-11 | End: 2019-01-11

## 2019-01-11 RX ORDER — PHENYLEPHRINE HYDROCHLORIDE 25 MG/ML
1 SOLUTION/ DROPS OPHTHALMIC
Status: CANCELLED | OUTPATIENT
Start: 2019-01-11

## 2019-01-11 RX ORDER — LIDOCAINE 40 MG/G
CREAM TOPICAL
Status: CANCELLED | OUTPATIENT
Start: 2019-01-11

## 2019-01-11 RX ORDER — ONDANSETRON 4 MG/1
4 TABLET, ORALLY DISINTEGRATING ORAL EVERY 30 MIN PRN
Status: CANCELLED | OUTPATIENT
Start: 2019-01-11

## 2019-01-11 RX ORDER — TROPICAMIDE 10 MG/ML
1 SOLUTION/ DROPS OPHTHALMIC
Status: CANCELLED | OUTPATIENT
Start: 2019-01-11

## 2019-01-11 RX ORDER — ONDANSETRON 2 MG/ML
4 INJECTION INTRAMUSCULAR; INTRAVENOUS EVERY 30 MIN PRN
Status: CANCELLED | OUTPATIENT
Start: 2019-01-11

## 2019-01-11 RX ORDER — NALOXONE HYDROCHLORIDE 0.4 MG/ML
.1-.4 INJECTION, SOLUTION INTRAMUSCULAR; INTRAVENOUS; SUBCUTANEOUS
Status: CANCELLED | OUTPATIENT
Start: 2019-01-11 | End: 2019-01-12

## 2019-01-15 DIAGNOSIS — Z53.9 DIAGNOSIS NOT YET DEFINED: Primary | ICD-10-CM

## 2019-01-15 PROCEDURE — G0180 MD CERTIFICATION HHA PATIENT: HCPCS | Performed by: INTERNAL MEDICINE

## 2019-01-21 ENCOUNTER — HOSPITAL ENCOUNTER (OUTPATIENT)
Facility: AMBULATORY SURGERY CENTER | Age: 74
End: 2019-01-21
Attending: OPHTHALMOLOGY
Payer: COMMERCIAL

## 2019-01-22 ENCOUNTER — OFFICE VISIT (OUTPATIENT)
Dept: OPHTHALMOLOGY | Facility: CLINIC | Age: 74
End: 2019-01-22
Attending: OPHTHALMOLOGY
Payer: COMMERCIAL

## 2019-01-22 DIAGNOSIS — H04.123 DRY EYE SYNDROME OF BOTH EYES: ICD-10-CM

## 2019-01-22 DIAGNOSIS — H16.121 FILAMENTARY KERATITIS OF RIGHT EYE: Primary | ICD-10-CM

## 2019-01-22 DIAGNOSIS — K21.9 GASTROESOPHAGEAL REFLUX DISEASE, ESOPHAGITIS PRESENCE NOT SPECIFIED: ICD-10-CM

## 2019-01-22 DIAGNOSIS — H53.469 HOMONYMOUS HEMIANOPIA, UNSPECIFIED LATERALITY: ICD-10-CM

## 2019-01-22 PROCEDURE — G0463 HOSPITAL OUTPT CLINIC VISIT: HCPCS | Mod: ZF

## 2019-01-22 RX ORDER — OMEPRAZOLE 20 MG/1
20 TABLET, DELAYED RELEASE ORAL
Qty: 180 TABLET | Refills: 3 | Status: CANCELLED | OUTPATIENT
Start: 2019-01-22

## 2019-01-22 RX ORDER — FLUOROMETHOLONE 0.1 %
1 SUSPENSION, DROPS(FINAL DOSAGE FORM)(ML) OPHTHALMIC (EYE) 2 TIMES DAILY
Qty: 1 BOTTLE | Refills: 0 | Status: SHIPPED | OUTPATIENT
Start: 2019-01-22 | End: 2019-05-13

## 2019-01-22 ASSESSMENT — SLIT LAMP EXAM - LIDS: COMMENTS: DERMATOCHALASIS, MGD, BLEPHARITIS

## 2019-01-22 ASSESSMENT — TONOMETRY
IOP_METHOD: ICARE
OS_IOP_MMHG: 21
OD_IOP_MMHG: 19

## 2019-01-22 ASSESSMENT — VISUAL ACUITY
CORRECTION_TYPE: GLASSES
OD_PH_CC+: +1
OS_CC+: -1
METHOD: SNELLEN - LINEAR

## 2019-01-22 ASSESSMENT — EXTERNAL EXAM - LEFT EYE: OS_EXAM: NORMAL

## 2019-01-22 NOTE — TELEPHONE ENCOUNTER
90-day perscription request.    He patient is requesting authorization to dispense a 90-day supply.    New Pharmacy:      [facility]  [address]  [place], [place]    T [phone]  F  [phone]

## 2019-01-22 NOTE — NURSING NOTE
Chief Complaint(s) and History of Present Illness(es)     Follow Up     Laterality: both eyes              Comments     Pt is here to get her plugs taken out. Pt c/o RE being really itchy   constantly since her last visit with Dr. Schuler on 10/08/18. Pt states the   itching is so bad it wakes her up at night, making her itch her RE.    Gabby Aranda, COMT 2:17 PM January 22, 2019

## 2019-01-22 NOTE — PROGRESS NOTES
Assessment & Plan      Susi Aguila is a 73 year old female with the following diagnoses:   1. Filamentary keratitis of right eye    2. Homonymous hemianopia, unspecified laterality    3. Dry eye syndrome of both eyes         Scheduled for cataract extraction next week.    She has been having right eye irritation and fb sensation, particularly in right medial lower lid. She notes mucous discharge, which she often extracts herself with her fingers.  She thought irritation could be due to punctal plug that was placed previously, however punctal plug not present on exam today.     Signs of chronic irritation right lower lid likely due to mucous fishing, eyelid laxity, mucous production. Patient likely has neurotrophic component as well.     Removal of filaments today    Start FML two times a day right eye x 5 days for optimization prior to cataract surface   Start artificial tear ointment four times a day   Offered to Re- place punctal plug (collagen). Patient defers at this time.    Discussed return precautions - patient aware to contact Eye Clinic if any concerns prior to cataract surgery.    Mery German MD  PGY-3 Ophthalmology    Complete documentation of historical and exam elements from today's encounter can be found in the full encounter summary report (not reduplicated in this progress note). I personally obtained the chief complaint(s) and history of present illness.  I confirmed and edited as necessary the review of systems, past medical/surgical history, family history, social history, and examination findings as documented by others; and I examined the patient myself. I personally reviewed the relevant tests, images, and reports as documented above. I formulated and edited as necessary the assessment and plan and discussed the findings and management plan with the patient and family. I was present for all procedures performed during this visit.    Susi Gibbons MD  Cornea Fellow

## 2019-01-23 ASSESSMENT — VISUAL ACUITY
OD_CC: 20/150
OD_PH_CC: 20/150
OS_CC: 20/30

## 2019-01-24 ENCOUNTER — TELEPHONE (OUTPATIENT)
Dept: PHARMACY | Facility: CLINIC | Age: 74
End: 2019-01-24

## 2019-01-24 ENCOUNTER — TELEPHONE (OUTPATIENT)
Dept: OPHTHALMOLOGY | Facility: CLINIC | Age: 74
End: 2019-01-24

## 2019-01-24 RX ORDER — PHENYLEPHRINE HYDROCHLORIDE 25 MG/ML
1 SOLUTION/ DROPS OPHTHALMIC
Status: CANCELLED | OUTPATIENT
Start: 2019-01-24

## 2019-01-24 RX ORDER — PROPARACAINE HYDROCHLORIDE 5 MG/ML
1 SOLUTION/ DROPS OPHTHALMIC ONCE
Status: CANCELLED | OUTPATIENT
Start: 2019-01-24 | End: 2019-01-24

## 2019-01-24 RX ORDER — CYCLOPENTOLATE HYDROCHLORIDE 10 MG/ML
1 SOLUTION/ DROPS OPHTHALMIC
Status: CANCELLED | OUTPATIENT
Start: 2019-01-24

## 2019-01-24 RX ORDER — TROPICAMIDE 10 MG/ML
1 SOLUTION/ DROPS OPHTHALMIC
Status: CANCELLED | OUTPATIENT
Start: 2019-01-24

## 2019-01-24 NOTE — TELEPHONE ENCOUNTER
Patient did not schedule as covisit for March appointment with Dr. Bloom.      FYI to provider that patient does not have MTM coverage and will not reach out to the patient at this time, please determine if you would like patient to continue to see MTM.  Visit cost is $150 to patient.    Leticia Urbina, PharmD [facility]  Medication Therapy Management Practitioner   #[phone]

## 2019-01-26 ENCOUNTER — ANESTHESIA EVENT (OUTPATIENT)
Dept: SURGERY | Facility: AMBULATORY SURGERY CENTER | Age: 74
End: 2019-01-26

## 2019-01-26 ENCOUNTER — TELEPHONE (OUTPATIENT)
Dept: OPHTHALMOLOGY | Facility: CLINIC | Age: 74
End: 2019-01-26

## 2019-01-26 RX ORDER — HYDROMORPHONE HYDROCHLORIDE 1 MG/ML
.3-.5 INJECTION, SOLUTION INTRAMUSCULAR; INTRAVENOUS; SUBCUTANEOUS EVERY 10 MIN PRN
Status: CANCELLED | OUTPATIENT
Start: 2019-01-26

## 2019-01-26 RX ORDER — ACETAMINOPHEN 325 MG/1
975 TABLET ORAL ONCE
Status: CANCELLED | OUTPATIENT
Start: 2019-01-26 | End: 2019-01-26

## 2019-01-26 RX ORDER — ONDANSETRON 4 MG/1
4 TABLET, ORALLY DISINTEGRATING ORAL EVERY 30 MIN PRN
Status: CANCELLED | OUTPATIENT
Start: 2019-01-26

## 2019-01-26 RX ORDER — ONDANSETRON 2 MG/ML
4 INJECTION INTRAMUSCULAR; INTRAVENOUS EVERY 30 MIN PRN
Status: CANCELLED | OUTPATIENT
Start: 2019-01-26

## 2019-01-26 RX ORDER — NALOXONE HYDROCHLORIDE 0.4 MG/ML
.1-.4 INJECTION, SOLUTION INTRAMUSCULAR; INTRAVENOUS; SUBCUTANEOUS
Status: CANCELLED | OUTPATIENT
Start: 2019-01-26 | End: 2019-01-27

## 2019-01-26 RX ORDER — SODIUM CHLORIDE, SODIUM LACTATE, POTASSIUM CHLORIDE, CALCIUM CHLORIDE 600; 310; 30; 20 MG/100ML; MG/100ML; MG/100ML; MG/100ML
INJECTION, SOLUTION INTRAVENOUS CONTINUOUS
Status: CANCELLED | OUTPATIENT
Start: 2019-01-26

## 2019-01-26 RX ORDER — FENTANYL CITRATE 50 UG/ML
25-50 INJECTION, SOLUTION INTRAMUSCULAR; INTRAVENOUS
Status: CANCELLED | OUTPATIENT
Start: 2019-01-26

## 2019-01-26 RX ORDER — OXYCODONE HYDROCHLORIDE 5 MG/1
5 TABLET ORAL EVERY 4 HOURS PRN
Status: CANCELLED | OUTPATIENT
Start: 2019-01-26

## 2019-01-26 RX ORDER — MEPERIDINE HYDROCHLORIDE 25 MG/ML
12.5 INJECTION INTRAMUSCULAR; INTRAVENOUS; SUBCUTANEOUS
Status: CANCELLED | OUTPATIENT
Start: 2019-01-26

## 2019-01-26 NOTE — TELEPHONE ENCOUNTER
[facility] Call Center    Phone Message    May a detailed message be left on voicemail: yes    Reason for Call: Other: Pt is thinking about rescheduling the surgery appt on 1/28/2019. Pt is thinking about waiting until the Spring time. Please follow up with Pt to discuss options and next steps for Pt.      Action Taken: Routed to: Eye Clinic

## 2019-01-28 ENCOUNTER — ANESTHESIA (OUTPATIENT)
Dept: SURGERY | Facility: AMBULATORY SURGERY CENTER | Age: 74
End: 2019-01-28
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
## 2019-01-29 ENCOUNTER — TELEPHONE (OUTPATIENT)
Dept: OPHTHALMOLOGY | Facility: CLINIC | Age: 74
End: 2019-01-29

## 2019-01-29 NOTE — TELEPHONE ENCOUNTER
"Staff message from surgeon 1/28 - \"Patient did not show for surgery today.  There is a phone center message in regarding her wanting to reschedule to the Spring\".      Tried calling patient today to confirm pulling off of surgery schedule for 2/4 too for second eye.  Will have patient contact me in spring, when she is ready to schedule.    Will try later today to reach the patient - phone has a message from the cell company saying person can't be contacted at this time.  "

## 2019-01-30 ENCOUNTER — TELEPHONE (OUTPATIENT)
Dept: OPHTHALMOLOGY | Facility: CLINIC | Age: 74
End: 2019-01-30

## 2019-02-08 DIAGNOSIS — G25.81 RESTLESS LEG SYNDROME: ICD-10-CM

## 2019-02-08 NOTE — TELEPHONE ENCOUNTER
"Requested Prescriptions   Pending Prescriptions Disp Refills     rOPINIRole (REQUIP) 4 MG tablet [Pharmacy Med Name: ROPINIROLE 4MG TABLETS] 90 tablet 0    Last Written Prescription Date:  12/13/2018  Last Fill Quantity: 30,  # refills: 1   Last office visit: 5/1/2018 with prescribing provider:  5/1/2018   Future Office Visit:   Next 5 appointments (look out 90 days)    Mar 12, 2019 11:00 AM CDT  Office Visit with Alice Bloom MD  [facility] [address] [place] [address]  [address]  [facility] [address]  [phone]          Sig: TAKE 1 TABLET BY MOUTH AT BEDTIME    Antiparkinson's Agents Protocol Failed - 2/8/2019 11:29 AM       Failed - Blood pressure under 140/90 in past 12 months    BP Readings from Last 3 Encounters:   01/09/19 146/76   12/12/18 138/82   08/09/18 152/82                Failed - Recent (6 mo) or future (30 days) visit within the authorizing provider's specialty    Patient had office visit in the last 6 months or has a visit in the next 30 days with authorizing provider or within the authorizing provider's specialty.  See \"Patient Info\" tab in inbasket, or \"Choose Columns\" in Meds & Orders section of the refill encounter.           Passed - CBC on record in past 12 months    Recent Labs   Lab Test 12/12/18  1051  05/01/18  1140   WBC  --   --  7.0   RBC  --   --  3.77*   HGB 9.5*   < > 10.2*   HCT  --   --  33.8*   PLT  --   --  301    < > = values in this interval not displayed.                Passed - ALT on record in past 12 months        Recent Labs   Lab Test 03/08/18  0953   ALT 25            Passed - Serum Creatinine on file in past 12 months    Recent Labs   Lab Test 12/12/18  1051   CR 0.99            Passed - Medication is active on med list       Passed - Patient is age 18 or older       Passed - No active pregnancy on record       Passed - No positive pregnancy test in the past 12 months        traZODone (DESYREL) 50 MG tablet " "[Pharmacy Med Name: TRAZODONE 50MG TABLETS] 180 tablet 0    Historical Sig: TAKE 2 TABLETS BY MOUTH AT BEDTIME    Serotonin Modulators Passed - 2/8/2019 11:29 AM       Passed - Recent (12 mo) or future (30 days) visit within the authorizing provider's specialty    Patient had office visit in the last 12 months or has a visit in the next 30 days with authorizing provider or within the authorizing provider's specialty.  See \"Patient Info\" tab in inbasket, or \"Choose Columns\" in Meds & Orders section of the refill encounter.             Passed - Medication is active on med list       Passed - Patient is age 18 or older       Passed - No active pregnancy on record       Passed - No positive pregnancy test in past 12 months          "

## 2019-02-11 RX ORDER — ROPINIROLE 4 MG/1
TABLET, FILM COATED ORAL
Qty: 90 TABLET | Refills: 0 | Status: SHIPPED | OUTPATIENT
Start: 2019-02-11 | End: 2019-11-12

## 2019-02-11 RX ORDER — TRAZODONE HYDROCHLORIDE 50 MG/1
TABLET, FILM COATED ORAL
Qty: 180 TABLET | Refills: 0 | Status: SHIPPED | OUTPATIENT
Start: 2019-02-11 | End: 2019-04-23

## 2019-02-11 NOTE — TELEPHONE ENCOUNTER
Trazadone    Routing refill request to provider for review/approval because:  Medication is reported/historical    Requip    Routing refill request to provider for review/approval because:  Blood pressure out of range     Darline LIU RN, BSN, PHN

## 2019-02-11 NOTE — TELEPHONE ENCOUNTER
Script sent.  Please be sure her appointment in march is a covisit with MTM, and remind her to bring all her pill bottles to that appointment.  Alice Bloom MD

## 2019-02-12 NOTE — TELEPHONE ENCOUNTER
Left vm for patient to call us back. See Dr Bloom message. Patient has appt on 3/12 with Dr Bloom but it is not a co-visit.

## 2019-02-13 ENCOUNTER — PATIENT OUTREACH (OUTPATIENT)
Dept: CARE COORDINATION | Facility: CLINIC | Age: 74
End: 2019-02-13

## 2019-02-13 ASSESSMENT — ACTIVITIES OF DAILY LIVING (ADL): DEPENDENT_IADLS:: CLEANING;COOKING;LAUNDRY;SHOPPING;TRANSPORTATION;INCONTINENCE
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
## 2019-02-13 NOTE — LETTER
[facility]ARE COORDIN[facility]  [address] [place] [address]    February 15, 2019    Susi Aguila  [address]   [place] [address]      Dear Susi,    I am a clinic care coordinator who works with Alice Bloom MD at the [facility]. I recently tried to call and was unable to reach you. I wanted to follow-up with you and see if there is anything else I can do to further assist you.     Please feel free to contact me at [phone], with any questions, concerns or updates. I look forward to hearing from you!!     Sincerely,     Talita Henry

## 2019-02-15 NOTE — PROGRESS NOTES
Clinic Care Coordination Contact  [facility]/Voicemail    Referral Source: PCP  Clinical Data: Care Coordinator Outreach  Outreach attempted x 1.  Left message on voicemail with call back information and requested return call.  Plan: Care Coordinator will mail out care coordination unable to contact letter with care coordinator contact information and a request for a call back. Care Coordinator noted that patient has a follow-up visit with PCP on 3.12.18.  CC will not be available in clinic this day, but will follow-up to see if any additional concerns were noted prior to closing the to care coordination.     GALINA Grace  Clinic Care Coordinator  [phone]  [email or web address]

## 2019-02-18 NOTE — TELEPHONE ENCOUNTER
The pt called back and she is aware and scheduled for her upcoming co visit with pcp and Leticia Gan on 2/18/2019 at 10:58 AM

## 2019-02-19 DIAGNOSIS — K21.9 GASTROESOPHAGEAL REFLUX DISEASE, ESOPHAGITIS PRESENCE NOT SPECIFIED: ICD-10-CM

## 2019-02-19 NOTE — TELEPHONE ENCOUNTER
"Requested Prescriptions   Pending Prescriptions Disp Refills     ranitidine (ZANTAC) 150 MG tablet  Last Written Prescription Date:  12/12/2018  Last Fill Quantity: 180 tablet,  # refills: 3   Last office visit: 1/9/2019 with prescribing provider:  Kathrine Schuler MD    Future Office Visit:   Next 5 appointments (look out 90 days)    Mar 13, 2019  2:00 PM CDT  Office Visit with Leticia Urbina [facility] ([facility] [address]  [address]  [facility] [address]  [phone]   Mar 13, 2019  2:00 PM CDT  [facility] Visit with Alice Bloom MD  [facility] [address]  [address]  [facility] [address]  [phone]          180 tablet 3     Sig: Take 1 tablet (150 mg) by mouth 2 times daily    H2 Blockers Protocol Passed - 2/19/2019 11:58 AM       Passed - Patient is age 12 or older       Passed - Recent (12 mo) or future (30 days) visit within the authorizing provider's specialty    Patient had office visit in the last 12 months or has a visit in the next 30 days with authorizing provider or within the authorizing provider's specialty.  See \"Patient Info\" tab in inbasket, or \"Choose Columns\" in Meds & Orders section of the refill encounter.             Passed - Medication is active on med list          "

## 2019-02-20 NOTE — TELEPHONE ENCOUNTER
Patient has refills remaining at local pharmacy.  Advised to transfer remaining scripts.  Alma Rosa MAGUIRE RN - Triage  [facility]

## 2019-03-18 ENCOUNTER — TELEPHONE (OUTPATIENT)
Dept: PEDIATRICS | Facility: CLINIC | Age: 74
End: 2019-03-18

## 2019-03-18 DIAGNOSIS — K21.9 GASTROESOPHAGEAL REFLUX DISEASE, ESOPHAGITIS PRESENCE NOT SPECIFIED: ICD-10-CM

## 2019-03-18 NOTE — TELEPHONE ENCOUNTER
"Requested Prescriptions   Pending Prescriptions Disp Refills     ranitidine (ZANTAC) 150 MG tablet  Last Written Prescription Date:  12/12/2018  Last Fill Quantity: 180 tablet,  # refills: 3   Last office visit: 01/09/2019:  Kathrine Schuler MD    Future Office Visit:     180 tablet 3     Sig: Take 1 tablet (150 mg) by mouth 2 times daily    H2 Blockers Protocol Passed - 3/18/2019  9:21 AM       Passed - Patient is age 12 or older       Passed - Recent (12 mo) or future (30 days) visit within the authorizing provider's specialty    Patient had office visit in the last 12 months or has a visit in the next 30 days with authorizing provider or within the authorizing provider's specialty.  See \"Patient Info\" tab in inbasket, or \"Choose Columns\" in Meds & Orders section of the refill encounter.             Passed - Medication is active on med list          "

## 2019-03-18 NOTE — TELEPHONE ENCOUNTER
Mail order pharmacy   [facility] called regarding cream I can not find it on the patient med list, its not the facial cream either   I asked them to fax the request too for more clarity

## 2019-03-18 NOTE — TELEPHONE ENCOUNTER
LMTCB- patient may have information on what medication she is wanting refilled.     Samantha Kline RN Flex

## 2019-03-18 NOTE — TELEPHONE ENCOUNTER
Script sent.  Pt is overdue for appointment.  Please help her schedule. I know she lives fairly far away now, and she may need to establish with a new provider closer to where she is living.   Alice Bloom MD

## 2019-03-19 DIAGNOSIS — H16.121 FILAMENTARY KERATITIS OF RIGHT EYE: ICD-10-CM

## 2019-03-19 RX ORDER — FLUOROMETHOLONE 0.1 %
1 SUSPENSION, DROPS(FINAL DOSAGE FORM)(ML) OPHTHALMIC (EYE) 2 TIMES DAILY
Qty: 1 BOTTLE | Refills: 0 | OUTPATIENT
Start: 2019-03-19

## 2019-03-19 NOTE — TELEPHONE ENCOUNTER
Called patient and she said that she will be establishing with a new provider closer to her home and that she does not need us to refill anymore of her meds for her.

## 2019-03-19 NOTE — TELEPHONE ENCOUNTER
"Patient states that she has been receiving multiple calls from \"Medicare\" trying to sell her different products.  She agreed to try this cream-doesn't know what the cream is for, but they were going to mail it to her to try.  She didn't request this cream, and is unsure if this was a scam.   She is receiving multiple requests per day for different braces, and medications .  She has ordered the braces, but they have never arrived.  Patient told me to ignore this request.  TING Chavez RN    "

## 2019-03-19 NOTE — TELEPHONE ENCOUNTER
Non-detailed message left to return our call.  OK to speak to triage nurse at PCP station when they call back.  Alma Rosa MAGUIRE RN - Triage  [facility]

## 2019-03-19 NOTE — PROGRESS NOTES
"Clinic Care Coordination Contact    Pt \"No Show\" appointment scheduled for 3/14/19. No further outreaches will be made at this time unless a new referral is made or a change in the pt's status occurs. Patient was provided with this writer's contact information and encouraged to call with any questions or concerns.    GALINA Grace  Clinic Care Coordinator  [phone]  [email or web address]        "

## 2019-03-20 ENCOUNTER — TELEPHONE (OUTPATIENT)
Dept: OPHTHALMOLOGY | Facility: CLINIC | Age: 74
End: 2019-03-20

## 2019-03-22 ENCOUNTER — HOSPITAL ENCOUNTER (OUTPATIENT)
Facility: AMBULATORY SURGERY CENTER | Age: 74
End: 2019-03-22
Attending: OPHTHALMOLOGY
Payer: COMMERCIAL

## 2019-03-25 DIAGNOSIS — Z53.9 ERRONEOUS ENCOUNTER--DISREGARD: Primary | ICD-10-CM

## 2019-04-08 ENCOUNTER — DOCUMENTATION ONLY (OUTPATIENT)
Dept: CARE COORDINATION | Facility: CLINIC | Age: 74
End: 2019-04-08

## 2019-04-09 DIAGNOSIS — E78.5 HYPERLIPIDEMIA LDL GOAL <100: ICD-10-CM

## 2019-04-09 NOTE — TELEPHONE ENCOUNTER
"Requested Prescriptions   Pending Prescriptions Disp Refills     atorvastatin (LIPITOR) 40 MG tablet [Pharmacy Med Name: ATORVASTATIN 40MG TABLETS] 90 tablet 0     Sig: TAKE 1 TABLET BY MOUTH EVERY DAY.       Statins Protocol Passed - 4/9/2019  1:56 PM        Passed - LDL on file in past 12 months     Recent Labs   Lab Test 06/15/18  0859   LDL 81             Passed - No abnormal creatine kinase in past 12 months     Recent Labs   Lab Test 10/07/13  1047   CKT 60                Passed - Recent (12 mo) or future (30 days) visit within the authorizing provider's specialty     Patient had office visit in the last 12 months or has a visit in the next 30 days with authorizing provider or within the authorizing provider's specialty.  See \"Patient Info\" tab in inbasket, or \"Choose Columns\" in Meds & Orders section of the refill encounter.              Passed - Medication is active on med list        Passed - Patient is age 18 or older        Passed - No active pregnancy on record        Passed - No positive pregnancy test in past 12 months          "

## 2019-04-11 RX ORDER — ATORVASTATIN CALCIUM 40 MG/1
TABLET, FILM COATED ORAL
Qty: 90 TABLET | Refills: 0 | Status: SHIPPED | OUTPATIENT
Start: 2019-04-11 | End: 2020-09-23

## 2019-04-11 NOTE — TELEPHONE ENCOUNTER
Prescription approved per [facility], [facility] or [facility] refill protocol.  Alma Rosa MAGUIRE RN - Triage  [facility]

## 2019-04-12 ENCOUNTER — TELEPHONE (OUTPATIENT)
Dept: OPHTHALMOLOGY | Facility: CLINIC | Age: 74
End: 2019-04-12

## 2019-04-17 ENCOUNTER — TELEPHONE (OUTPATIENT)
Dept: OPHTHALMOLOGY | Facility: CLINIC | Age: 74
End: 2019-04-17

## 2019-04-17 ENCOUNTER — HOSPITAL ENCOUNTER (OUTPATIENT)
Facility: AMBULATORY SURGERY CENTER | Age: 74
End: 2019-04-17
Attending: OPHTHALMOLOGY
Payer: COMMERCIAL

## 2019-04-17 DIAGNOSIS — H25.813 COMBINED FORM OF AGE-RELATED CATARACT, BOTH EYES: Primary | ICD-10-CM

## 2019-04-17 NOTE — TELEPHONE ENCOUNTER
Patient is scheduled for surgery with Dr. Gerber    Spoke or left message with: patient    Date of Surgery: 5/20 & 6/3    Location: [facility]    H&P: Scheduled with [facility]    Surgery packet: mailed 4/17 in clinic    Additional comments: 4th reschedule

## 2019-04-18 DIAGNOSIS — I10 ESSENTIAL HYPERTENSION: ICD-10-CM

## 2019-04-19 DIAGNOSIS — I10 ESSENTIAL HYPERTENSION: ICD-10-CM

## 2019-04-19 RX ORDER — AMLODIPINE BESYLATE 10 MG/1
10 TABLET ORAL AT BEDTIME
Qty: 30 TABLET | Refills: 0 | Status: SHIPPED | OUTPATIENT
Start: 2019-04-19 | End: 2019-04-19

## 2019-04-19 NOTE — TELEPHONE ENCOUNTER
"Requested Prescriptions   Pending Prescriptions Disp Refills     amLODIPine (NORVASC) 10 MG tablet  Last Written Prescription Date:  8/9/2018  Last Fill Quantity: 90 tab,  # refills: 1   Last Office Visit: 3/13/2219 tai    Future Office Visit:      90 tablet 1     Sig: Take 1 tablet (10 mg) by mouth At Bedtime       Calcium Channel Blockers Protocol  Failed - 4/18/2019  8:52 PM        Failed - Blood pressure under 140/90 in past 12 months     BP Readings from Last 3 Encounters:   01/09/19 146/76   12/12/18 138/82   08/09/18 152/82                 Passed - Recent (12 mo) or future (30 days) visit within the authorizing provider's specialty     Patient had office visit in the last 12 months or has a visit in the next 30 days with authorizing provider or within the authorizing provider's specialty.  See \"Patient Info\" tab in inbasket, or \"Choose Columns\" in Meds & Orders section of the refill encounter.              Passed - Medication is active on med list        Passed - Patient is age 18 or older        Passed - No active pregnancy on record        Passed - Normal serum creatinine on file in past 12 months     Recent Labs   Lab Test 12/12/18  1051   CR 0.99             Passed - No positive pregnancy test in past 12 months        "

## 2019-04-19 NOTE — TELEPHONE ENCOUNTER
TC please call to schedule a follow up appointment for patient.    Medication is being filled for 1 time refill only due to:  Patient needs to be seen because provider wanted a follow up in March 2019. Rx for 1 month supply was sent to pharmacy.    Huyen Corbin RN

## 2019-04-22 RX ORDER — AMLODIPINE BESYLATE 10 MG/1
TABLET ORAL
Qty: 90 TABLET | Refills: 0 | Status: SHIPPED | OUTPATIENT
Start: 2019-04-22 | End: 2019-05-13

## 2019-04-22 NOTE — TELEPHONE ENCOUNTER
Called patient back again per patients request. Left vm for her to call us back. I will try her again in a few days.

## 2019-04-22 NOTE — TELEPHONE ENCOUNTER
Prescription approved per [facility] Refill Protocol.    Sandra Duncan RN - Triage  [facility]

## 2019-04-23 ENCOUNTER — DOCUMENTATION ONLY (OUTPATIENT)
Dept: CARE COORDINATION | Facility: CLINIC | Age: 74
End: 2019-04-23

## 2019-04-23 DIAGNOSIS — G25.81 RESTLESS LEG SYNDROME: ICD-10-CM

## 2019-04-23 NOTE — TELEPHONE ENCOUNTER
"Requested Prescriptions   Pending Prescriptions Disp Refills     traZODone (DESYREL) 50 MG tablet [Pharmacy Med Name: TRAZODONE 50MG TABLETS]  Last Written Prescription Date:  2/11/19  Last Fill Quantity: 180,  # refills: 0    Last office visit: 5/1/2018 with prescribing provider:  Logan Briones MD       Future Office Visit:   Next 5 appointments (look out 90 days)    Apr 24, 2019 10:30 AM CDT  Pre-Op physical with Juan Carlos [facility]ck [facility]  [facility] [address] [place] [address]  [address]  [facility] [address]  [phone]          180 tablet 0     Sig: TAKE 2 TABLETS BY MOUTH EVERY NIGHT AT BEDTIME       Serotonin Modulators Passed - 4/23/2019 11:33 AM        Passed - Recent (12 mo) or future (30 days) visit within the authorizing provider's specialty     Patient had office visit in the last 12 months or has a visit in the next 30 days with authorizing provider or within the authorizing provider's specialty.  See \"Patient Info\" tab in inbasket, or \"Choose Columns\" in Meds & Orders section of the refill encounter.              Passed - Medication is active on med list        Passed - Patient is age 18 or older        Passed - No active pregnancy on record        Passed - No positive pregnancy test in past 12 months          "

## 2019-04-25 DIAGNOSIS — G25.81 RESTLESS LEG SYNDROME: ICD-10-CM

## 2019-04-25 RX ORDER — TRAZODONE HYDROCHLORIDE 50 MG/1
TABLET, FILM COATED ORAL
Qty: 60 TABLET | Refills: 0 | Status: SHIPPED | OUTPATIENT
Start: 2019-04-25 | End: 2019-05-28

## 2019-04-25 NOTE — TELEPHONE ENCOUNTER
Patient is probably switching her care to the [facility] location because it is closer to her home.

## 2019-04-25 NOTE — TELEPHONE ENCOUNTER
"Requested Prescriptions   Pending Prescriptions Disp Refills     traZODone (DESYREL) 50 MG tablet [Pharmacy Med Name: TRAZODONE 50MG TABLETS]  Last Written Prescription Date:  04/25/2019  Last Fill Quantity: 60 tablet,  # refills: 0    Last office visit: 12/12/2018 with prescribing provider:  Alice Bloom MD        Future Office Visit:   Next 5 appointments (look out 90 days)    May 13, 2019 10:40 AM CDT  Pre-Op physical with Dior Baez PA-C  [facility] [address] [address]  [phone]          180 tablet 0     Sig: TAKE 2 TABLETS BY MOUTH EVERY NIGHT AT BEDTIME       Serotonin Modulators Passed - 4/25/2019 11:43 AM        Passed - Recent (12 mo) or future (30 days) visit within the authorizing provider's specialty     Patient had office visit in the last 12 months or has a visit in the next 30 days with authorizing provider or within the authorizing provider's specialty.  See \"Patient Info\" tab in inbasket, or \"Choose Columns\" in Meds & Orders section of the refill encounter.              Passed - Medication is active on med list        Passed - Patient is age 18 or older        Passed - No active pregnancy on record        Passed - No positive pregnancy test in past 12 months          "

## 2019-04-25 NOTE — TELEPHONE ENCOUNTER
30 day supply given.  Patient is due for yearly physical and lab work.  Please call and assist with scheduling appointment prior to next refill   Patient no show last 2 appointments scheduled  Alma Rosa MAGUIRE RN - Triage  [facility]

## 2019-04-25 NOTE — TELEPHONE ENCOUNTER
Called patient again and she scheduled a pre-op appt at the [facility] location which is much closer to her home. She will see how that appointment goes and if she likes it there she will be switching to that location.

## 2019-04-29 RX ORDER — TRAZODONE HYDROCHLORIDE 50 MG/1
TABLET, FILM COATED ORAL
Qty: 180 TABLET | Refills: 0 | OUTPATIENT
Start: 2019-04-29

## 2019-04-29 NOTE — TELEPHONE ENCOUNTER
#30 was provided last week-per chart review, patient switching to different clinic. Informed pharmacy.  Brooklyn Nguyen RN  Message handled by Nurse Triage.

## 2019-04-30 ENCOUNTER — DOCUMENTATION ONLY (OUTPATIENT)
Dept: CARE COORDINATION | Facility: CLINIC | Age: 74
End: 2019-04-30

## 2019-05-13 ENCOUNTER — OFFICE VISIT (OUTPATIENT)
Dept: FAMILY MEDICINE | Facility: CLINIC | Age: 74
End: 2019-05-13
Payer: COMMERCIAL

## 2019-05-13 VITALS
DIASTOLIC BLOOD PRESSURE: 90 MMHG | SYSTOLIC BLOOD PRESSURE: 150 MMHG | OXYGEN SATURATION: 96 % | HEART RATE: 80 BPM | TEMPERATURE: 98.8 F | RESPIRATION RATE: 18 BRPM

## 2019-05-13 DIAGNOSIS — N18.30 CKD (CHRONIC KIDNEY DISEASE) STAGE 3, GFR 30-59 ML/MIN (H): ICD-10-CM

## 2019-05-13 DIAGNOSIS — R05.9 COUGH: ICD-10-CM

## 2019-05-13 DIAGNOSIS — E78.5 HYPERLIPIDEMIA LDL GOAL <100: ICD-10-CM

## 2019-05-13 DIAGNOSIS — E11.49 TYPE 2 DIABETES MELLITUS WITH OTHER NEUROLOGIC COMPLICATION, WITHOUT LONG-TERM CURRENT USE OF INSULIN (H): ICD-10-CM

## 2019-05-13 DIAGNOSIS — E03.9 HYPOTHYROIDISM, UNSPECIFIED TYPE: ICD-10-CM

## 2019-05-13 DIAGNOSIS — I10 ESSENTIAL HYPERTENSION: ICD-10-CM

## 2019-05-13 DIAGNOSIS — D50.9 IRON DEFICIENCY ANEMIA, UNSPECIFIED IRON DEFICIENCY ANEMIA TYPE: ICD-10-CM

## 2019-05-13 DIAGNOSIS — H26.9 CATARACT OF BOTH EYES, UNSPECIFIED CATARACT TYPE: ICD-10-CM

## 2019-05-13 DIAGNOSIS — Z01.818 PREOP GENERAL PHYSICAL EXAM: Primary | ICD-10-CM

## 2019-05-13 LAB
ALBUMIN SERPL-MCNC: 4 G/DL (ref 3.4–5)
ALP SERPL-CCNC: 86 U/L (ref 40–150)
ALT SERPL W P-5'-P-CCNC: 17 U/L (ref 0–50)
ANION GAP SERPL CALCULATED.3IONS-SCNC: 9 MMOL/L (ref 3–14)
AST SERPL W P-5'-P-CCNC: 19 U/L (ref 0–45)
BILIRUB SERPL-MCNC: 0.4 MG/DL (ref 0.2–1.3)
BUN SERPL-MCNC: 17 MG/DL (ref 7–30)
CALCIUM SERPL-MCNC: 9.6 MG/DL (ref 8.5–10.1)
CHLORIDE SERPL-SCNC: 102 MMOL/L (ref 94–109)
CHOLEST SERPL-MCNC: 321 MG/DL
CO2 SERPL-SCNC: 27 MMOL/L (ref 20–32)
CREAT SERPL-MCNC: 1.32 MG/DL (ref 0.52–1.04)
CREAT UR-MCNC: 166 MG/DL
GFR SERPL CREATININE-BSD FRML MDRD: 40 ML/MIN/{1.73_M2}
GLUCOSE SERPL-MCNC: 121 MG/DL (ref 70–99)
HBA1C MFR BLD: 7.1 % (ref 0–5.6)
HDLC SERPL-MCNC: 54 MG/DL
HGB BLD-MCNC: 10.7 G/DL (ref 11.7–15.7)
LDLC SERPL CALC-MCNC: 207 MG/DL
MICROALBUMIN UR-MCNC: 92 MG/L
MICROALBUMIN/CREAT UR: 55.3 MG/G CR (ref 0–25)
NONHDLC SERPL-MCNC: 267 MG/DL
POTASSIUM SERPL-SCNC: 3.7 MMOL/L (ref 3.4–5.3)
PROT SERPL-MCNC: 8.1 G/DL (ref 6.8–8.8)
SODIUM SERPL-SCNC: 138 MMOL/L (ref 133–144)
T4 FREE SERPL-MCNC: 0.31 NG/DL (ref 0.76–1.46)
TRIGL SERPL-MCNC: 299 MG/DL
TSH SERPL DL<=0.005 MIU/L-ACNC: 85.4 MU/L (ref 0.4–4)

## 2019-05-13 PROCEDURE — 36415 COLL VENOUS BLD VENIPUNCTURE: CPT | Performed by: PHYSICIAN ASSISTANT

## 2019-05-13 PROCEDURE — 84439 ASSAY OF FREE THYROXINE: CPT | Performed by: PHYSICIAN ASSISTANT

## 2019-05-13 PROCEDURE — 84443 ASSAY THYROID STIM HORMONE: CPT | Performed by: PHYSICIAN ASSISTANT

## 2019-05-13 PROCEDURE — 80053 COMPREHEN METABOLIC PANEL: CPT | Performed by: PHYSICIAN ASSISTANT

## 2019-05-13 PROCEDURE — 80061 LIPID PANEL: CPT | Performed by: PHYSICIAN ASSISTANT

## 2019-05-13 PROCEDURE — 83036 HEMOGLOBIN GLYCOSYLATED A1C: CPT | Performed by: PHYSICIAN ASSISTANT

## 2019-05-13 PROCEDURE — 82043 UR ALBUMIN QUANTITATIVE: CPT | Performed by: PHYSICIAN ASSISTANT

## 2019-05-13 PROCEDURE — 99214 OFFICE O/P EST MOD 30 MIN: CPT | Performed by: PHYSICIAN ASSISTANT

## 2019-05-13 PROCEDURE — 85018 HEMOGLOBIN: CPT | Performed by: PHYSICIAN ASSISTANT

## 2019-05-13 RX ORDER — GLUCOSAMINE HCL/CHONDROITIN SU 500-400 MG
CAPSULE ORAL
Qty: 100 EACH | Refills: 3 | Status: SHIPPED | OUTPATIENT
Start: 2019-05-13

## 2019-05-13 RX ORDER — LANCETS
EACH MISCELLANEOUS
Qty: 100 EACH | Refills: 6 | Status: SHIPPED | OUTPATIENT
Start: 2019-05-13

## 2019-05-13 RX ORDER — DOXYCYCLINE 100 MG/1
100 CAPSULE ORAL 2 TIMES DAILY
Qty: 20 CAPSULE | Refills: 0 | Status: SHIPPED | OUTPATIENT
Start: 2019-05-13 | End: 2019-05-23
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
## 2019-05-15 RX ORDER — CYCLOPENTOLATE HYDROCHLORIDE 10 MG/ML
1 SOLUTION/ DROPS OPHTHALMIC
Status: CANCELLED | OUTPATIENT
Start: 2019-05-15

## 2019-05-15 RX ORDER — PROPARACAINE HYDROCHLORIDE 5 MG/ML
1 SOLUTION/ DROPS OPHTHALMIC ONCE
Status: CANCELLED | OUTPATIENT
Start: 2019-05-15 | End: 2019-05-15

## 2019-05-15 RX ORDER — PHENYLEPHRINE HYDROCHLORIDE 25 MG/ML
1 SOLUTION/ DROPS OPHTHALMIC
Status: CANCELLED | OUTPATIENT
Start: 2019-05-15

## 2019-05-15 RX ORDER — TROPICAMIDE 10 MG/ML
1 SOLUTION/ DROPS OPHTHALMIC
Status: CANCELLED | OUTPATIENT
Start: 2019-05-15

## 2019-05-16 RX ORDER — AMLODIPINE BESYLATE 10 MG/1
10 TABLET ORAL DAILY
COMMUNITY
End: 2019-05-20 | Stop reason: HOSPADM

## 2019-05-20 ENCOUNTER — TELEPHONE (OUTPATIENT)
Dept: FAMILY MEDICINE | Facility: CLINIC | Age: 74
End: 2019-05-20

## 2019-05-20 RX ORDER — OFLOXACIN 3 MG/ML
1 SOLUTION/ DROPS OPHTHALMIC 3 TIMES DAILY
Qty: 5 ML | Refills: 1 | Status: CANCELLED | OUTPATIENT
Start: 2019-05-20

## 2019-05-20 RX ORDER — PREDNISOLONE ACETATE 10 MG/ML
1 SUSPENSION/ DROPS OPHTHALMIC 4 TIMES DAILY
Qty: 5 ML | Refills: 1 | Status: CANCELLED | OUTPATIENT
Start: 2019-05-20
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
## 2019-05-28 ENCOUNTER — OFFICE VISIT (OUTPATIENT)
Dept: FAMILY MEDICINE | Facility: CLINIC | Age: 74
End: 2019-05-28
Payer: COMMERCIAL

## 2019-05-28 VITALS — TEMPERATURE: 97.2 F | SYSTOLIC BLOOD PRESSURE: 180 MMHG | DIASTOLIC BLOOD PRESSURE: 100 MMHG | HEART RATE: 67 BPM

## 2019-05-28 DIAGNOSIS — G47.00 INSOMNIA, UNSPECIFIED TYPE: ICD-10-CM

## 2019-05-28 DIAGNOSIS — I10 ESSENTIAL HYPERTENSION: ICD-10-CM

## 2019-05-28 DIAGNOSIS — E78.5 HYPERLIPIDEMIA LDL GOAL <100: ICD-10-CM

## 2019-05-28 DIAGNOSIS — K21.9 GASTROESOPHAGEAL REFLUX DISEASE, ESOPHAGITIS PRESENCE NOT SPECIFIED: ICD-10-CM

## 2019-05-28 DIAGNOSIS — E03.9 HYPOTHYROIDISM, UNSPECIFIED TYPE: ICD-10-CM

## 2019-05-28 DIAGNOSIS — D50.9 IRON DEFICIENCY ANEMIA, UNSPECIFIED IRON DEFICIENCY ANEMIA TYPE: Primary | ICD-10-CM

## 2019-05-28 DIAGNOSIS — K59.1 FUNCTIONAL DIARRHEA: ICD-10-CM

## 2019-05-28 PROCEDURE — 99214 OFFICE O/P EST MOD 30 MIN: CPT | Performed by: PHYSICIAN ASSISTANT

## 2019-05-28 RX ORDER — MIRTAZAPINE 7.5 MG/1
7.5-15 TABLET, FILM COATED ORAL AT BEDTIME
Qty: 60 TABLET | Refills: 2 | Status: SHIPPED | OUTPATIENT
Start: 2019-05-28 | End: 2019-05-28

## 2019-05-28 RX ORDER — FERROUS SULFATE 325(65) MG
325 TABLET ORAL 2 TIMES DAILY WITH MEALS
Qty: 180 TABLET | Refills: 3 | Status: SHIPPED | OUTPATIENT
Start: 2019-05-28

## 2019-05-28 RX ORDER — TIZANIDINE 2 MG/1
2 TABLET ORAL AT BEDTIME
COMMUNITY
End: 2019-08-29

## 2019-05-28 ASSESSMENT — ANXIETY QUESTIONNAIRES
1. FEELING NERVOUS, ANXIOUS, OR ON EDGE: MORE THAN HALF THE DAYS
5. BEING SO RESTLESS THAT IT IS HARD TO SIT STILL: MORE THAN HALF THE DAYS
6. BECOMING EASILY ANNOYED OR IRRITABLE: SEVERAL DAYS
IF YOU CHECKED OFF ANY PROBLEMS ON THIS QUESTIONNAIRE, HOW DIFFICULT HAVE THESE PROBLEMS MADE IT FOR YOU TO DO YOUR WORK, TAKE CARE OF THINGS AT HOME, OR GET ALONG WITH OTHER PEOPLE: SOMEWHAT DIFFICULT
GAD7 TOTAL SCORE: 14
3. WORRYING TOO MUCH ABOUT DIFFERENT THINGS: NEARLY EVERY DAY
2. NOT BEING ABLE TO STOP OR CONTROL WORRYING: NEARLY EVERY DAY
7. FEELING AFRAID AS IF SOMETHING AWFUL MIGHT HAPPEN: NOT AT ALL

## 2019-05-28 ASSESSMENT — PATIENT HEALTH QUESTIONNAIRE - PHQ9
SUM OF ALL RESPONSES TO PHQ QUESTIONS 1-9: 13
5. POOR APPETITE OR OVEREATING: NEARLY EVERY DAY

## 2019-05-28 NOTE — PROGRESS NOTES
Subjective     Susi Aguila is a 73 year old female who presents to clinic today for the following health issues:    HPI   Hypertension/Hyperlipidemia/Hypothyroidism Follow-up      Do you check your blood pressure regularly outside of the clinic? No     Are you following a low salt diet? Yes    Are your blood pressures ever more than 140 on the top number (systolic) OR more   than 90 on the bottom number (diastolic), for example 140/90? Not sure is not checking them    Amount of exercise or physical activity: 6-7 days/week for an average of less than 15 minutes    Problems taking medications regularly: Yes,  problems remembering to take and sometime does not want to take them    Medication side effects: none    Diet: low salt    She was instructed to restart lisinopril, levothyroxine, and atorvastatin. Will recheck her blood pressure in 2 weeks. Baseline labs toady - will review with her in 2 weeks.     Has not restarted any of these medications       Diarrhea      Duration: x4yrs    Description:       Consistency of stool: runny, loose and pasty       Blood in stool: no        Number of loose stools past 24 hours: 0    Intensity:  mild, moderate    Accompanying signs and symptoms:       Fever: no        Nausea/vomitting: no        Abdominal pain: YES- pressure with urination        Weight loss: no     History (recent antibiotics or travel/ill contacts/med changes/testing done): yes- at [facility]    Precipitating or alleviating factors: None    Therapies tried and outcome: OTC      PROBLEMS TO ADD ON...  Insomnia  Tried Lunesta and Ambien - neither worked   Trazodone: 2 tabs doesn't work well enough, 3 tabs she's too drowsy the next day    GERD  Still experiencing sxs   Taking her omeprazole and Zantac  -------------------------------------    Patient Active Problem List   Diagnosis     Essential hypertension     Esophageal reflux     Anxiety state     Stricture and stenosis of esophagus     Cerebral aneurysm      HYPERLIPIDEMIA LDL GOAL <100     ACP (advance care planning)     Sciatica     Tailor's bunion     Dry eye syndrome     Restless leg syndrome     Insomnia     Homonymous bilateral field defects in visual field     Arteriovenous malformation     Mixed incontinence     Hypothyroidism, unspecified type     Proteinuria     Advanced directives, counseling/discussion     Neuropathy     Type 2 diabetes mellitus with other neurologic complication, without long-term current use of insulin (H)     Moderate major depression (H)     Iron deficiency anemia, unspecified iron deficiency anemia type     Hemiparesis affecting left side as late effect of stroke (H)     S/P resection of meningioma     CKD (chronic kidney disease) stage 3, GFR 30-59 ml/min (H)     Past Surgical History:   Procedure Laterality Date     AMPUTATE TOE(S)  9/18/2012    Procedure: AMPUTATE TOE(S);  RIGHT FIFTH TOE AMPUTATION ;  Surgeon: Jayme Garcia DPM;  Location:  [facility]     benign meningioma   03/19/2018    benign meningioma removal      BRAIN SURGERY      x's 3     BUNIONECTOMY  9/3/2013    Procedure: BUNIONECTOMY;  RIGHT FOOT SIMPLE LEYDI'S BUNIONECTOMY ;  Surgeon: Jayme Garcia DPM;  Location: [facility] OR     C NONSPECIFIC PROCEDURE  1998;     meningioma     C NONSPECIFIC PROCEDURE  1998    coil procedures, cerebral aneursyms     C NONSPECIFIC PROCEDURE  2001    intra-extracranial aneursym bypass     C NONSPECIFIC PROCEDURE  approx 3565-1844    plastic op/congenital facial abn     C NONSPECIFIC PROCEDURE  approx 1983    ooph     C NONSPECIFIC PROCEDURE  approx 1997    gb     C NONSPECIFIC PROCEDURE  approx 2001    cystocele repair     C NONSPECIFIC PROCEDURE  2005    hardware removal, skull     C NONSPECIFIC PROCEDURE  2006    upper endoscopy; dilatation     C NONSPECIFIC PROCEDURE  2007    neg cystoscopy     C NONSPECIFIC PROCEDURE  2008    cerebral aneursym     C NONSPECIFIC PROCEDURE  2011     Hammertoe reconstruction with exostectomy fifth  digit, right foot.     CHOLECYSTECTOMY       COMBINED CYSTOSCOPY, RETROGRADES, URETEROSCOPY, INSERT STENT  1/17/2014    Procedure: COMBINED CYSTOSCOPY, RETROGRADES, URETEROSCOPY, INSERT STENT;  Cystoscopy, Right Retrograde, Right Ureteroscopy;  Surgeon: Db Chase MD;  Location: [facility] OR     COMBINED CYSTOSCOPY, RETROGRADES, URETEROSCOPY, LASER HOLMIUM LITHOTRIPSY URETER(S), INSERT STENT Left 4/7/2015    Procedure: COMBINED CYSTOSCOPY, RETROGRADES, URETEROSCOPY, LASER HOLMIUM LITHOTRIPSY URETER(S), INSERT STENT;  Surgeon: Db Chase MD;  Location:  OR     CYSTOSCOPY, RETROGRADES, INSERT STENT URETER(S), COMBINED Left 3/2/2015    Procedure: COMBINED CYSTOSCOPY, RETROGRADES, INSERT STENT URETER(S);  Surgeon: Db Chase MD;  Location:  OR     GENITOURINARY SURGERY       HC COLONOSCOPY THRU STOMA, DIAGNOSTIC  2004    nl colonoscopy; next due 2014     HEAD & NECK SURGERY       HYSTERECTOMY, PAP NO LONGER INDICATED  approx 1975    hyst/oop: ovarian cyst     LITHOTRIPSY  July 21, 2011     NONSPECIFIC PROCEDURE      eswl     OPEN REDUCTION INTERNAL FIXATION HIP BIPOLAR Left 11/23/2015    Procedure: OPEN REDUCTION INTERNAL FIXATION HIP BIPOLAR;  Surgeon: Matheus Og MD;  Location:  OR     ORTHOPEDIC SURGERY         Social History     Tobacco Use     Smoking status: Passive Smoke Exposure - Never Smoker     Smokeless tobacco: Never Used     Tobacco comment: roommates smoke   Substance Use Topics     Alcohol use: No     Alcohol/week: 0.0 oz     Comment: high ball once a month     Family History   Problem Relation Age of Onset     Breast Cancer Mother      Respiratory Mother      Osteoporosis Mother      Alcohol/Drug Father         cirrhosis     Cancer Maternal Grandmother            Reviewed and updated as needed this visit by Provider         Review of Systems   ROS COMP: Constitutional, cardiovascular, GI, endocrine and psych systems are negative, except as otherwise noted.       Objective    BP (!) 180/100   Pulse 67   Temp 97.2  F (36.2  C) (Tympanic)   There is no height or weight on file to calculate BMI.  Physical Exam   Constitutional: healthy, alert, active, no distress.    Head: Normocephalic   Musculoskeletal: extremities normal- no gross deformities noted, gait normal, normal muscle tone and able to move about the exam room without difficulty.    Skin: no suspicious lesions or rashes appreciated on exposed areas  Neurologic: Gait normal. Moving all extremities spontaneously, no apparent weakness.    Psychiatric: mentation appears normal, thoughts are clear and concise. Converses appropriately. Affect is Appropriate/mood-congruent          Assessment & Plan   Assessment  1. Iron deficiency anemia, unspecified iron deficiency anemia type    2. Gastroesophageal reflux disease, esophagitis presence not specified    3. Insomnia, unspecified type    4. Hyperlipidemia LDL goal <100    5. Hypothyroidism, unspecified type    6. Essential hypertension    7. Functional diarrhea         Plan    Patient Instructions   6) Restart your lisinopril (for blood pressure). I'd like to recheck your blood pressure 1-2 weeks after you restart this.     5) Restart your levothyroxine (for thyroid). We'll recheck your thyroid level in 2-3 months, fasting, during an office visit with me.     4) Restart your atorvastatin (for cholesterol and heart). We'll recheck your thyroid level in 2-3 months, fasting, during an office visit with me.     1,7) Start an iron supplement twice daily (for iron deficiency). We'll recheck your labs in 2-3 months. Hold off on Senna and Miralax unless you become constipated. Fiber can help bulk up the stool. Foods high in fiber: apples, flax seed, and nuts.     2) Increase your omeprazole to 2 tabs (40mg) twice daily. Continue taking your Zantac. If your reflux is still present after 1 month please call me, we'll likely need to do an EGD (stomach scope).     3) Stop your  trazodone and let's try Mirtazepine instead. Start with 1 tab at bedtime. You can increase to 2 tabs after 3-5 days if needed. Let me know if this helps with sleep.    Follow up OV (40 minutes) with Dior in 2-3 months. Will complete fasting labs at this appointment as well.          Return in about 2 weeks (around 6/11/2019) for a BP check with a medical assistant.    Dior Baez PA-C  [facility]

## 2019-05-28 NOTE — TELEPHONE ENCOUNTER
"Requested Prescriptions   Pending Prescriptions Disp Refills     mirtazapine (REMERON) 7.5 MG tablet [Pharmacy Med Name: MIRTAZAPINE 7.5MG TABLETS] 180 tablet 2     Sig: TAKE 1 TO 2 TABLETS(7.5 TO 15 MG) BY MOUTH AT BEDTIME   Last Written Prescription Date:  5-28-19  Last Fill Quantity: 180,  # refills: 2   Last office visit: 5/28/2019 with prescribing provider:  5-28-19   Future Office Visit:      Atypical Antidepressants Protocol Passed - 5/28/2019 12:07 PM        Passed - Recent (12 mo) or future (30 days) visit within the authorizing provider's specialty     Patient had office visit in the last 12 months or has a visit in the next 30 days with authorizing provider or within the authorizing provider's specialty.  See \"Patient Info\" tab in inbasket, or \"Choose Columns\" in Meds & Orders section of the refill encounter.              Passed - Medication active on med list        Passed - Patient is age 18 or older        Passed - No active pregnancy on record        Passed - No positive pregnancy test in past 12 mos        "

## 2019-05-28 NOTE — PATIENT INSTRUCTIONS
Restart your lisinopril (for blood pressure). I'd like to recheck your blood pressure 1-2 weeks after you restart this.     Restart your levothyroxine (for thyroid). We'll recheck your thyroid level in 2-3 months, fasting, during an office visit with me.     Restart your atorvastatin (for cholesterol and heart). We'll recheck your thyroid level in 2-3 months, fasting, during an office visit with me.     Start an iron supplement twice daily (for iron deficiency). We'll recheck your labs in 2-3 months. Hold off on Senna and Miralax unless you become constipated. Fiber can help bulk up the stool. Foods high in fiber: apples, flax seed, and nuts.     Increase your omeprazole to 2 tabs (40mg) twice daily. Continue taking your Zantac. If your reflux is still present after 1 month please call me, we'll likely need to do an EGD (stomach scope).     Stop your trazodone and let's try Mirtazepine instead. Start with 1 tab at bedtime. You can increase to 2 tabs after 3-5 days if needed. Let me know if this helps with sleep.    Follow up OV (40 minutes) with Dior in 2-3 months. Will complete fasting labs at this appointment as well.

## 2019-05-28 NOTE — TELEPHONE ENCOUNTER
RN adjusted to #180 tablets with 0 refills.   Please advise on 90 day supply.    Micaela Atkinson RN, BSN, PHN

## 2019-05-29 RX ORDER — MIRTAZAPINE 7.5 MG/1
TABLET, FILM COATED ORAL
Qty: 180 TABLET | Refills: 0 | Status: SHIPPED | OUTPATIENT
Start: 2019-05-29 | End: 2019-11-12

## 2019-05-29 RX ORDER — TROPICAMIDE 10 MG/ML
1 SOLUTION/ DROPS OPHTHALMIC
Status: CANCELLED | OUTPATIENT
Start: 2019-05-29

## 2019-05-29 RX ORDER — PRAMIPEXOLE DIHYDROCHLORIDE 1 MG/1
1 TABLET ORAL 3 TIMES DAILY
COMMUNITY
End: 2019-06-03 | Stop reason: HOSPADM

## 2019-05-29 RX ORDER — PROPARACAINE HYDROCHLORIDE 5 MG/ML
1 SOLUTION/ DROPS OPHTHALMIC ONCE
Status: CANCELLED | OUTPATIENT
Start: 2019-05-29 | End: 2019-05-29

## 2019-05-29 RX ORDER — CYCLOPENTOLATE HYDROCHLORIDE 10 MG/ML
1 SOLUTION/ DROPS OPHTHALMIC
Status: CANCELLED | OUTPATIENT
Start: 2019-05-29

## 2019-05-29 RX ORDER — PHENYLEPHRINE HYDROCHLORIDE 25 MG/ML
1 SOLUTION/ DROPS OPHTHALMIC
Status: CANCELLED | OUTPATIENT
Start: 2019-05-29

## 2019-05-29 ASSESSMENT — ANXIETY QUESTIONNAIRES: GAD7 TOTAL SCORE: 14

## 2019-05-31 ENCOUNTER — ANESTHESIA EVENT (OUTPATIENT)
Dept: SURGERY | Facility: AMBULATORY SURGERY CENTER | Age: 74
End: 2019-05-31

## 2019-05-31 RX ORDER — FENTANYL CITRATE 50 UG/ML
25-50 INJECTION, SOLUTION INTRAMUSCULAR; INTRAVENOUS
Status: CANCELLED | OUTPATIENT
Start: 2019-05-31

## 2019-05-31 RX ORDER — NALOXONE HYDROCHLORIDE 0.4 MG/ML
.1-.4 INJECTION, SOLUTION INTRAMUSCULAR; INTRAVENOUS; SUBCUTANEOUS
Status: CANCELLED | OUTPATIENT
Start: 2019-05-31 | End: 2019-06-01

## 2019-05-31 RX ORDER — MEPERIDINE HYDROCHLORIDE 25 MG/ML
12.5 INJECTION INTRAMUSCULAR; INTRAVENOUS; SUBCUTANEOUS
Status: CANCELLED | OUTPATIENT
Start: 2019-05-31

## 2019-05-31 RX ORDER — ONDANSETRON 4 MG/1
4 TABLET, ORALLY DISINTEGRATING ORAL EVERY 30 MIN PRN
Status: CANCELLED | OUTPATIENT
Start: 2019-05-31

## 2019-05-31 RX ORDER — ONDANSETRON 2 MG/ML
4 INJECTION INTRAMUSCULAR; INTRAVENOUS EVERY 30 MIN PRN
Status: CANCELLED | OUTPATIENT
Start: 2019-05-31

## 2019-05-31 RX ORDER — OXYCODONE HYDROCHLORIDE 5 MG/1
5 TABLET ORAL EVERY 4 HOURS PRN
Status: CANCELLED | OUTPATIENT
Start: 2019-05-31

## 2019-05-31 RX ORDER — SODIUM CHLORIDE, SODIUM LACTATE, POTASSIUM CHLORIDE, CALCIUM CHLORIDE 600; 310; 30; 20 MG/100ML; MG/100ML; MG/100ML; MG/100ML
INJECTION, SOLUTION INTRAVENOUS CONTINUOUS
Status: CANCELLED | OUTPATIENT
Start: 2019-05-31

## 2019-06-03 ENCOUNTER — ANESTHESIA (OUTPATIENT)
Dept: SURGERY | Facility: AMBULATORY SURGERY CENTER | Age: 74
End: 2019-06-03

## 2019-06-12 ENCOUNTER — TELEPHONE (OUTPATIENT)
Dept: FAMILY MEDICINE | Facility: CLINIC | Age: 74
End: 2019-06-12

## 2019-06-12 NOTE — TELEPHONE ENCOUNTER
Patient is calling stating was seen 05/28 with provider and misplaced after visit summary would like to know treatment options and next steps. Please call to discuss. Thank you.

## 2019-06-12 NOTE — TELEPHONE ENCOUNTER
Plan    Patient Instructions   6) Restart your lisinopril (for blood pressure). I'd like to recheck your blood pressure 1-2 weeks after you restart this.      5) Restart your levothyroxine (for thyroid). We'll recheck your thyroid level in 2-3 months, fasting, during an office visit with me.      4) Restart your atorvastatin (for cholesterol and heart). We'll recheck your thyroid level in 2-3 months, fasting, during an office visit with me.      1,7) Start an iron supplement twice daily (for iron deficiency). We'll recheck your labs in 2-3 months. Hold off on Senna and Miralax unless you become constipated. Fiber can help bulk up the stool. Foods high in fiber: apples, flax seed, and nuts.      2) Increase your omeprazole to 2 tabs (40mg) twice daily. Continue taking your Zantac. If your reflux is still present after 1 month please call me, we'll likely need to do an EGD (stomach scope).      3) Stop your trazodone and let's try Mirtazepine instead. Start with 1 tab at bedtime. You can increase to 2 tabs after 3-5 days if needed. Let me know if this helps with sleep.     Follow up OV (40 minutes) with Dior in 2-3 months. Will complete fasting labs at this appointment as well.       Spoke with patient informed as above patient scheduled BP with nurse for 6/14/19, fasting appointment with Dior 7/29/19.   She has not picked up iron supplement or Mirtazepine. Her care broke down and has not had a ride to  meds. She will try and get this week and start them      Return in about 2 weeks (around 6/11/2019) for a BP check with a medical assistant.     Dior Baez PA-C  [facility]

## 2019-06-17 ENCOUNTER — TELEPHONE (OUTPATIENT)
Dept: PEDIATRICS | Facility: CLINIC | Age: 74
End: 2019-06-17

## 2019-06-17 NOTE — TELEPHONE ENCOUNTER
Received fax from from [facility] for pain cream script. Physician believes this is fraudulent. Called to check with patient and LMTCB to clarify.  Audrey Amaral LPN

## 2019-06-18 ENCOUNTER — TELEPHONE (OUTPATIENT)
Dept: OPHTHALMOLOGY | Facility: CLINIC | Age: 74
End: 2019-06-18

## 2019-06-18 NOTE — TELEPHONE ENCOUNTER
Spoke with patient to let her know we would be sending a letter of termination of care for no show appointments.  She understands and isn't surprised.  Arnav Gan

## 2019-06-18 NOTE — TELEPHONE ENCOUNTER
"This is fraud, I confirmed about two months ago for the exact same \"script\". Brought to [facility] at the time as for  wanted me to. She states if you continue to get them just toss them.   "

## 2019-06-21 ENCOUNTER — TELEPHONE (OUTPATIENT)
Dept: FAMILY MEDICINE | Facility: CLINIC | Age: 74
End: 2019-06-21

## 2019-06-21 NOTE — TELEPHONE ENCOUNTER
Message left on VM to return call to clinic at [phone] or [phone].    - What medication is patient taking for diabetes? (insulin?)    Micaela Atkinson RN BSN PHN

## 2019-06-21 NOTE — TELEPHONE ENCOUNTER
RN unable to find an active diabetes medication on patients med list.   Please advise on insulin.     Micaela Atkinson RN, BSN, PHN

## 2019-06-24 NOTE — TELEPHONE ENCOUNTER
Patient informed per Dior Baez PA-C, see below read word for word.  Patient asked that we make sure provider is aware that she will not be on anything for her diabetes.  No insulin or oral meds for diabetes.  She's confused because of the testing supplies. Informed her that she can still monitor her sugars and let us know if there are concerns.  Please advise.            5/13/19 11:47 AM [id number]    Component Value Flag Ref Range Units Status Collected Lab   Hemoglobin A1C 7.1  High   0 - 5.6 % Final 05/13/2019 11:47 AM BE   Comment:   Normal <5.7% Prediabetes 5.7-6.4%  Diabetes 6.5% or higher - adopted from ADA   consensus guidelines.

## 2019-06-24 NOTE — TELEPHONE ENCOUNTER
She has diabetes therefore she should be monitoring her sugars. Especially if having sxs of hypoglycemia

## 2019-06-24 NOTE — TELEPHONE ENCOUNTER
Rn spoke with patient.   Patient is unsure what she should be taking.     BG testing supplies were sent to pharmacy on 5/13, during patient pre-op exam, but no medications were sent in:    blood glucose (NO BRAND SPECIFIED) test strip 100 strip 6 5/13/2019  --   Sig: Use to test blood sugar 1 times daily or as directed. To accompany: Blood Glucose Monitor Brands: per insurance.     blood glucose calibration (NO BRAND SPECIFIED) solution 1 Bottle 3 5/13/2019  --   Sig: To accompany: Blood Glucose Monitor Brands: per insurance     blood glucose monitoring (NO BRAND SPECIFIED) meter device kit 1 kit 0 5/13/2019  --   Sig: Use to test blood sugar 1 times daily or as directed. Preferred blood glucose meter OR supplies to accompany: Blood Glucose Monitor Brands: per insurance.       RN could not locate any note in OV that states to start insulin.   Noted:  RECOMMENDATIONS:      APPROVAL GIVEN to proceed with proposed procedure, without further diagnostic evaluation     Patient has stopped her Metformin, amlodipine, lisinopril, levothyroxine, and atorvastatin. She was instructed to restart lisinopril, levothyroxine, and atorvastatin. Will recheck her blood pressure in 2 weeks. Baseline labs toady - will review with her in 2 weeks.     Please advise.     Micaela Atkinson RN, BSN, PHN

## 2019-06-24 NOTE — TELEPHONE ENCOUNTER
Message left on VM to return call to clinic at [phone] or [phone].    Micaela Atkinson RN BSN PHN

## 2019-06-24 NOTE — TELEPHONE ENCOUNTER
Correct, she had stopped her Metformin. A1c is close to goal. So we'll recheck it off medication again in the future

## 2019-06-25 NOTE — TELEPHONE ENCOUNTER
Patient notified and voiced understanding and agreement.    Nadege will check her blood sugars 1-2 times daily and plans to call us back with an update in about 2 weeks.     Rani Gonzalez RN on 6/25/2019 at 8:09 AM

## 2019-07-03 ENCOUNTER — TELEPHONE (OUTPATIENT)
Dept: FAMILY MEDICINE | Facility: CLINIC | Age: 74
End: 2019-07-03

## 2019-07-03 NOTE — TELEPHONE ENCOUNTER
Patient calling states she had her blood pressure taken at [facility] er and was to call in her blood pressure reading 139/87.

## 2019-07-05 NOTE — TELEPHONE ENCOUNTER
Per 6/12 result letter sent to patient:  Patient Instructions   6) Restart your lisinopril (for blood pressure). I'd like to recheck your blood pressure 1-2 weeks after you restart this.     Please advise on changes/BP.    Micaela Atkinson, RN, BSN, PHN

## 2019-07-09 NOTE — TELEPHONE ENCOUNTER
Left message on voice mail for patient to call clinic. [phone]  Vandana Ramos RN

## 2019-07-19 ENCOUNTER — TELEPHONE (OUTPATIENT)
Dept: FAMILY MEDICINE | Facility: CLINIC | Age: 74
End: 2019-07-19

## 2019-07-19 NOTE — TELEPHONE ENCOUNTER
Message left on VM to return call to clinic at [phone] or [phone].    Micaela Atkinson RN BSN PHN

## 2019-07-20 ENCOUNTER — NURSE TRIAGE (OUTPATIENT)
Dept: NURSING | Facility: CLINIC | Age: 74
End: 2019-07-20
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
## 2019-07-22 NOTE — TELEPHONE ENCOUNTER
Left message on voice mail for patient to call clinic. [phone]  Why is she requesting MRI leg?  Which leg?  Need more info.

## 2019-07-23 NOTE — TELEPHONE ENCOUNTER
Left message on voice mail for patient to call clinic. [phone]  Looks like patient may have gone to [facility]?

## 2019-07-23 NOTE — TELEPHONE ENCOUNTER
Per demographics OK to leave DVM on cell phone.     Detailed VM left on patients phone regarding keep 7/31 appointment with PCP and imaging if needed would be ordered at that appointment.. Advised to call back with questions, [phone] or [phone].    Micaela Atkinson RN BSN PHN
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
## 2019-07-26 ENCOUNTER — TELEPHONE (OUTPATIENT)
Dept: FAMILY MEDICINE | Facility: CLINIC | Age: 74
End: 2019-07-26

## 2019-07-26 NOTE — TELEPHONE ENCOUNTER
Patient states she would like her medication list faxed to [facility] at [phone].    Thank you.

## 2019-08-27 DIAGNOSIS — G25.81 RESTLESS LEG SYNDROME: ICD-10-CM

## 2019-08-27 NOTE — TELEPHONE ENCOUNTER
"Requested Prescriptions   Pending Prescriptions Disp Refills     traZODone (DESYREL) 50 MG tablet [Pharmacy Med Name: TRAZODONE 50MG TABLETS] 60 tablet 0     Sig: TAKE 2 TABLETS BY MOUTH EVERY NIGHT AT BEDTIME  Last Written Prescription Date:  4/25/19  Last Fill Quantity: 60,  # refills: 0   Last office visit: 5/1/2018 with prescribing provider:  Sasha   Future Office Visit:         Serotonin Modulators Failed - 8/27/2019  5:30 PM        Failed - Medication is active on med list        Passed - Recent (12 mo) or future (30 days) visit within the authorizing provider's specialty     Patient had office visit in the last 12 months or has a visit in the next 30 days with authorizing provider or within the authorizing provider's specialty.  See \"Patient Info\" tab in inbasket, or \"Choose Columns\" in Meds & Orders section of the refill encounter.              Passed - Patient is age 18 or older        Passed - No active pregnancy on record        Passed - No positive pregnancy test in past 12 months        "

## 2019-08-29 RX ORDER — TRAZODONE HYDROCHLORIDE 50 MG/1
TABLET, FILM COATED ORAL
Qty: 60 TABLET | Refills: 0 | OUTPATIENT
Start: 2019-08-29

## 2019-11-04 ENCOUNTER — TELEPHONE (OUTPATIENT)
Dept: FAMILY MEDICINE | Facility: CLINIC | Age: 74
End: 2019-11-04

## 2019-11-04 NOTE — TELEPHONE ENCOUNTER
Panel Management Review      Patient has the following on her problem list:     Depression / Dysthymia review    Measure:  Needs PHQ-9 score of 4 or less during index window.  Administer PHQ-9 and if score is 5 or more, send encounter to provider for next steps.    5 - 7 month window range:     PHQ-9 SCORE 7/27/2017 1/26/2018 5/28/2019   PHQ-9 Total Score - - -   PHQ-9 Total Score 5 9 13       If PHQ-9 recheck is 5 or more, route to provider for next steps.    Patient is due for:  PHQ9    Diabetes    ASA:     Last A1C  Lab Results   Component Value Date    A1C 7.1 05/13/2019    A1C 6.8 12/12/2018    A1C 6.6 06/15/2018    A1C 6.4 02/08/2018    A1C 6.8 09/28/2017     A1C tested: FAILED    Last LDL:    Lab Results   Component Value Date    CHOL 321 05/13/2019     Lab Results   Component Value Date    HDL 54 05/13/2019     Lab Results   Component Value Date     05/13/2019     Lab Results   Component Value Date    TRIG 299 05/13/2019     Lab Results   Component Value Date    CHOLHDLRATIO 2.6 06/29/2015     Lab Results   Component Value Date    NHDL 267 05/13/2019       Is the patient on a Statin? YES             Is the patient on Aspirin? YES    Medications     HMG CoA Reductase Inhibitors     atorvastatin (LIPITOR) 40 MG tablet       Salicylates     aspirin 81 MG tablet             Last three blood pressure readings:  BP Readings from Last 3 Encounters:   05/28/19 (!) 180/100   05/13/19 150/90   01/09/19 146/76       Date of last diabetes office visit: 05/13/2019     Tobacco History:     History   Smoking Status     Passive Smoke Exposure - Never Smoker   Smokeless Tobacco     Never Used     Comment: roommates smoke         Hypertension   Last three blood pressure readings:  BP Readings from Last 3 Encounters:   05/28/19 (!) 180/100   05/13/19 150/90   01/09/19 146/76     Blood pressure: FAILED    HTN Guidelines:  Less than 140/90      Composite cancer screening  Chart review shows that this patient is due/due  soon for the following Colonoscopy  Summary:    Patient is due/failing the following:   COLONOSCOPY, PHQ9 and PHYSICAL    Action needed:   Patient needs office visit for Physical and fasting labs.    Type of outreach:    Phone, left message for patient to call back.     Questions for provider review:    None                                                                                                                                    Demar Og CMA       Chart routed to Care Team .

## 2019-11-12 ENCOUNTER — TELEPHONE (OUTPATIENT)
Dept: FAMILY MEDICINE | Facility: CLINIC | Age: 74
End: 2019-11-12

## 2019-11-12 DIAGNOSIS — G47.00 INSOMNIA, UNSPECIFIED TYPE: ICD-10-CM

## 2019-11-12 DIAGNOSIS — G25.81 RESTLESS LEG SYNDROME: ICD-10-CM

## 2019-11-12 DIAGNOSIS — M62.89 MUSCLE TIGHTNESS: ICD-10-CM

## 2019-11-12 RX ORDER — ROPINIROLE 4 MG/1
TABLET, FILM COATED ORAL
Qty: 30 TABLET | Refills: 0 | Status: CANCELLED | OUTPATIENT
Start: 2019-11-12

## 2019-11-12 RX ORDER — MIRTAZAPINE 7.5 MG/1
TABLET, FILM COATED ORAL
Qty: 30 TABLET | Refills: 0 | OUTPATIENT
Start: 2019-11-12

## 2019-11-12 RX ORDER — TIZANIDINE 2 MG/1
2-4 TABLET ORAL AT BEDTIME
Qty: 28 TABLET | Refills: 0 | Status: SHIPPED | OUTPATIENT
Start: 2019-11-12

## 2019-11-12 RX ORDER — ROPINIROLE 4 MG/1
TABLET, FILM COATED ORAL
Qty: 30 TABLET | Refills: 0 | OUTPATIENT
Start: 2019-11-12

## 2019-11-12 RX ORDER — MIRTAZAPINE 7.5 MG/1
TABLET, FILM COATED ORAL
Qty: 28 TABLET | Refills: 0 | Status: SHIPPED | OUTPATIENT
Start: 2019-11-12 | End: 2020-04-14

## 2019-11-12 RX ORDER — TIZANIDINE 2 MG/1
2-4 TABLET ORAL AT BEDTIME
Qty: 30 TABLET | Refills: 0 | OUTPATIENT
Start: 2019-11-12

## 2019-11-12 RX ORDER — ROPINIROLE 4 MG/1
TABLET, FILM COATED ORAL
Qty: 14 TABLET | Refills: 0 | Status: SHIPPED | OUTPATIENT
Start: 2019-11-12 | End: 2020-01-22

## 2019-11-12 NOTE — TELEPHONE ENCOUNTER
Left message on voice mail for patient to call clinic. [phone]    Rani Gonzalez RN BSN

## 2019-11-12 NOTE — TELEPHONE ENCOUNTER
Patient is scheduled with Dior Baez PA-C on Monday, November 25 for follow up BP. She is informed of her partial refills to last until her appointment and verbalized a good understanding.     All 3 medications filled for a 14 day supply.     Rani Gonzalez RN BSN

## 2019-11-12 NOTE — TELEPHONE ENCOUNTER
Reason for Call:  Medication or medication refill:    Do you use a [facility]?  Name of the pharmacy and phone number for the current request:  [facility] [place] [phone]    Name of the medication requested: tiZANidine (ZANAFLEX) 2 MG tablet, rOPINIRole (REQUIP) 4 MG tablet, mirtazapine (REMERON) 7.5 MG tablet    Other request:    Can we leave a detailed message on this number? YES    Phone number patient can be reached at: Other phone number:  [phone]    Best Time: any    Call taken on 11/12/2019 at 10:39 AM by Patito Bryant

## 2019-11-12 NOTE — TELEPHONE ENCOUNTER
Medication refill has been refused. NEEDS APPT.   Schedule an appointment when patient returns call to clinic.    RN may refill enough medication until appt.       Per my 5/28 note:  Follow up OV (40 minutes) with Dior in 2-3 months. Will complete fasting labs at this appointment as well.   Return in about 2 weeks (around 6/11/2019) for a BP check with a medical assistant.

## 2019-11-12 NOTE — TELEPHONE ENCOUNTER
"Left message on voice mail for patient to call clinic. [phone]    Last OV with Dior Baez PA-C 5/28/19.    Patient was a \"no show\" for her last appointment with Dior Baez on 7/29/19 and also for a BP check on 7/1/19. No future appointments scheduled.     Routing refill request to provider for review/approval because:  Drug not on the [facility] [place]     rOPINIRole (REQUIP) 4 MG tablet   Previously ordered by Alice Bloom MD  Last written on 2/11/19.   Patient was a \"No Show\" with this provider on 3/13/19.     mirtazapine (REMERON) 7.5 MG tablet  Last written on 5/29/19    tiZANidine (ZANAFLEX) 2 MG tablet  Last written on 9/3/19.     All pended for #30 with appointment reminders.     Rani Gonzalez RN BSN                    "

## 2019-11-14 RX ORDER — MIRTAZAPINE 7.5 MG/1
TABLET, FILM COATED ORAL
Qty: 180 TABLET | Refills: 0 | OUTPATIENT
Start: 2019-11-14

## 2019-11-14 RX ORDER — ROPINIROLE 4 MG/1
TABLET, FILM COATED ORAL
Qty: 90 TABLET | Refills: 0 | OUTPATIENT
Start: 2019-11-14

## 2019-11-25 ENCOUNTER — OFFICE VISIT (OUTPATIENT)
Dept: FAMILY MEDICINE | Facility: CLINIC | Age: 74
End: 2019-11-25
Payer: COMMERCIAL

## 2019-11-25 VITALS
TEMPERATURE: 98.2 F | RESPIRATION RATE: 20 BRPM | SYSTOLIC BLOOD PRESSURE: 180 MMHG | DIASTOLIC BLOOD PRESSURE: 88 MMHG | HEART RATE: 73 BPM | OXYGEN SATURATION: 95 %

## 2019-11-25 DIAGNOSIS — I10 ESSENTIAL HYPERTENSION: ICD-10-CM

## 2019-11-25 DIAGNOSIS — E03.9 HYPOTHYROIDISM, UNSPECIFIED TYPE: ICD-10-CM

## 2019-11-25 DIAGNOSIS — E11.49 TYPE 2 DIABETES MELLITUS WITH OTHER NEUROLOGIC COMPLICATION, WITHOUT LONG-TERM CURRENT USE OF INSULIN (H): ICD-10-CM

## 2019-11-25 DIAGNOSIS — D50.9 IRON DEFICIENCY ANEMIA, UNSPECIFIED IRON DEFICIENCY ANEMIA TYPE: ICD-10-CM

## 2019-11-25 DIAGNOSIS — N32.81 OAB (OVERACTIVE BLADDER): Primary | ICD-10-CM

## 2019-11-25 DIAGNOSIS — I69.354 HEMIPARESIS AFFECTING LEFT SIDE AS LATE EFFECT OF STROKE (H): ICD-10-CM

## 2019-11-25 DIAGNOSIS — H25.9 AGE-RELATED CATARACT OF BOTH EYES, UNSPECIFIED AGE-RELATED CATARACT TYPE: ICD-10-CM

## 2019-11-25 DIAGNOSIS — E78.5 HYPERLIPIDEMIA LDL GOAL <100: ICD-10-CM

## 2019-11-25 LAB
ALBUMIN SERPL-MCNC: 3.9 G/DL (ref 3.4–5)
ALP SERPL-CCNC: 111 U/L (ref 40–150)
ALT SERPL W P-5'-P-CCNC: 18 U/L (ref 0–50)
ANION GAP SERPL CALCULATED.3IONS-SCNC: 7 MMOL/L (ref 3–14)
AST SERPL W P-5'-P-CCNC: 9 U/L (ref 0–45)
BASOPHILS # BLD AUTO: 0 10E9/L (ref 0–0.2)
BASOPHILS NFR BLD AUTO: 0.4 %
BILIRUB SERPL-MCNC: 0.5 MG/DL (ref 0.2–1.3)
BUN SERPL-MCNC: 14 MG/DL (ref 7–30)
CALCIUM SERPL-MCNC: 9.2 MG/DL (ref 8.5–10.1)
CHLORIDE SERPL-SCNC: 104 MMOL/L (ref 94–109)
CO2 SERPL-SCNC: 25 MMOL/L (ref 20–32)
CREAT SERPL-MCNC: 1.13 MG/DL (ref 0.52–1.04)
DIFFERENTIAL METHOD BLD: ABNORMAL
EOSINOPHIL # BLD AUTO: 0.1 10E9/L (ref 0–0.7)
EOSINOPHIL NFR BLD AUTO: 2 %
ERYTHROCYTE [DISTWIDTH] IN BLOOD BY AUTOMATED COUNT: 16.1 % (ref 10–15)
FERRITIN SERPL-MCNC: 9 NG/ML (ref 8–252)
GFR SERPL CREATININE-BSD FRML MDRD: 48 ML/MIN/{1.73_M2}
GLUCOSE SERPL-MCNC: 247 MG/DL (ref 70–99)
HCT VFR BLD AUTO: 40.4 % (ref 35–47)
HGB BLD-MCNC: 12.7 G/DL (ref 11.7–15.7)
LYMPHOCYTES # BLD AUTO: 2.5 10E9/L (ref 0.8–5.3)
LYMPHOCYTES NFR BLD AUTO: 36.1 %
MCH RBC QN AUTO: 26.9 PG (ref 26.5–33)
MCHC RBC AUTO-ENTMCNC: 31.4 G/DL (ref 31.5–36.5)
MCV RBC AUTO: 86 FL (ref 78–100)
MONOCYTES # BLD AUTO: 0.5 10E9/L (ref 0–1.3)
MONOCYTES NFR BLD AUTO: 6.5 %
NEUTROPHILS # BLD AUTO: 3.8 10E9/L (ref 1.6–8.3)
NEUTROPHILS NFR BLD AUTO: 55 %
PLATELET # BLD AUTO: 263 10E9/L (ref 150–450)
POTASSIUM SERPL-SCNC: 3.8 MMOL/L (ref 3.4–5.3)
PROT SERPL-MCNC: 8 G/DL (ref 6.8–8.8)
RBC # BLD AUTO: 4.72 10E12/L (ref 3.8–5.2)
SODIUM SERPL-SCNC: 136 MMOL/L (ref 133–144)
T4 FREE SERPL-MCNC: 0.34 NG/DL (ref 0.76–1.46)
TSH SERPL DL<=0.005 MIU/L-ACNC: 76.23 MU/L (ref 0.4–4)
WBC # BLD AUTO: 6.9 10E9/L (ref 4–11)

## 2019-11-25 PROCEDURE — 84443 ASSAY THYROID STIM HORMONE: CPT | Performed by: PHYSICIAN ASSISTANT

## 2019-11-25 PROCEDURE — 36415 COLL VENOUS BLD VENIPUNCTURE: CPT | Performed by: PHYSICIAN ASSISTANT

## 2019-11-25 PROCEDURE — 99214 OFFICE O/P EST MOD 30 MIN: CPT | Performed by: PHYSICIAN ASSISTANT

## 2019-11-25 PROCEDURE — 83721 ASSAY OF BLOOD LIPOPROTEIN: CPT | Mod: 59 | Performed by: PHYSICIAN ASSISTANT

## 2019-11-25 PROCEDURE — 80053 COMPREHEN METABOLIC PANEL: CPT | Performed by: PHYSICIAN ASSISTANT

## 2019-11-25 PROCEDURE — 82728 ASSAY OF FERRITIN: CPT | Performed by: PHYSICIAN ASSISTANT

## 2019-11-25 PROCEDURE — 80061 LIPID PANEL: CPT | Performed by: PHYSICIAN ASSISTANT

## 2019-11-25 PROCEDURE — 85025 COMPLETE CBC W/AUTO DIFF WBC: CPT | Performed by: PHYSICIAN ASSISTANT

## 2019-11-25 PROCEDURE — 83036 HEMOGLOBIN GLYCOSYLATED A1C: CPT | Performed by: PHYSICIAN ASSISTANT

## 2019-11-25 PROCEDURE — 84439 ASSAY OF FREE THYROXINE: CPT | Performed by: PHYSICIAN ASSISTANT

## 2019-11-25 RX ORDER — OXYBUTYNIN CHLORIDE 10 MG/1
10 TABLET, EXTENDED RELEASE ORAL DAILY
Qty: 90 TABLET | Refills: 1 | Status: SHIPPED | OUTPATIENT
Start: 2019-11-25

## 2019-11-25 NOTE — PROGRESS NOTES
Subjective     Susi Aguila is a 74 year old female who presents to clinic today for the following health issues:    HPI   Hyperlipidemia Follow-Up      Are you regularly taking any medication or supplement to lower your cholesterol?   Yes- atorvastatin    Are you having muscle aches or other side effects that you think could be caused by your cholesterol lowering medication?  No    Hypertension Follow-up      Do you check your blood pressure regularly outside of the clinic? No     Are you following a low salt diet? Yes    Are your blood pressures ever more than 140 on the top number (systolic) OR more   than 90 on the bottom number (diastolic), for example 140/90? Unsure    Hypothyroidism Follow-up      Since last visit, patient describes the following symptoms: loose stools      How many servings of fruits and vegetables do you eat daily?  2-3    On average, how many sweetened beverages do you drink each day (Examples: soda, juice, sweet tea, etc.  Do NOT count diet or artificially sweetened beverages)?   3  How many days per week do you miss taking your medication? 5  What makes it hard for you to take your medications?  remembering to take      Patient states she misses doses      PROBLEMS TO ADD ON...  -------------------------------------  Medication Followup of Mirtazapine, Ropinirole, tizanidine     Taking Medication as prescribed: yes    Side Effects:  Urinary frequency     Medication Helping Symptoms:  NO    Not using the mirtazapine due to grogginess when she gets up to urinate during the night         Patient Active Problem List   Diagnosis     Essential hypertension     Esophageal reflux     Anxiety state     Stricture and stenosis of esophagus     Cerebral aneurysm     HYPERLIPIDEMIA LDL GOAL <100     ACP (advance care planning)     Sciatica     Tailor's bunion     Dry eye syndrome     Restless leg syndrome     Insomnia     Homonymous bilateral field defects in visual field     Arteriovenous  malformation     Mixed incontinence     Hypothyroidism, unspecified type     Proteinuria     Advanced directives, counseling/discussion     Neuropathy     Type 2 diabetes mellitus with other neurologic complication, without long-term current use of insulin (H)     Moderate major depression (H)     Iron deficiency anemia, unspecified iron deficiency anemia type     Hemiparesis affecting left side as late effect of stroke (H)     S/P resection of meningioma     CKD (chronic kidney disease) stage 3, GFR 30-59 ml/min (H)     Past Surgical History:   Procedure Laterality Date     AMPUTATE TOE(S)  9/18/2012    Procedure: AMPUTATE TOE(S);  RIGHT FIFTH TOE AMPUTATION ;  Surgeon: Jayme Garcia DPM;  Location:  [facility]     benign meningioma   03/19/2018    benign meningioma removal      BRAIN SURGERY      x's 3     BUNIONECTOMY  9/3/2013    Procedure: BUNIONECTOMY;  RIGHT FOOT SIMPLE LEYDI'S BUNIONECTOMY ;  Surgeon: Jayme Garcia DPM;  Location:  [facility]     C NONSPECIFIC PROCEDURE  1998;     meningioma     C NONSPECIFIC PROCEDURE  1998    coil procedures, cerebral aneursyms     C NONSPECIFIC PROCEDURE  2001    intra-extracranial aneursym bypass     C NONSPECIFIC PROCEDURE  approx 5110-9421    plastic op/congenital facial abn     C NONSPECIFIC PROCEDURE  approx 1983    ooph     C NONSPECIFIC PROCEDURE  approx 1997    gb     C NONSPECIFIC PROCEDURE  approx 2001    cystocele repair     C NONSPECIFIC PROCEDURE  2005    hardware removal, skull     C NONSPECIFIC PROCEDURE  2006    upper endoscopy; dilatation     C NONSPECIFIC PROCEDURE  2007    neg cystoscopy     C NONSPECIFIC PROCEDURE  2008    cerebral aneursym     C NONSPECIFIC PROCEDURE  2011     Hammertoe reconstruction with exostectomy fifth digit, right foot.     CHOLECYSTECTOMY       COMBINED CYSTOSCOPY, RETROGRADES, URETEROSCOPY, INSERT STENT  1/17/2014    Procedure: COMBINED CYSTOSCOPY, RETROGRADES, URETEROSCOPY, INSERT STENT;  Cystoscopy, Right Retrograde, Right  Ureteroscopy;  Surgeon: Db Chase MD;  Location: [facility] OR     COMBINED CYSTOSCOPY, RETROGRADES, URETEROSCOPY, LASER HOLMIUM LITHOTRIPSY URETER(S), INSERT STENT Left 4/7/2015    Procedure: COMBINED CYSTOSCOPY, RETROGRADES, URETEROSCOPY, LASER HOLMIUM LITHOTRIPSY URETER(S), INSERT STENT;  Surgeon: Db Chase MD;  Location: [facility] OR     CYSTOSCOPY, RETROGRADES, INSERT STENT URETER(S), COMBINED Left 3/2/2015    Procedure: COMBINED CYSTOSCOPY, RETROGRADES, INSERT STENT URETER(S);  Surgeon: Db Chase MD;  Location: [facility] OR     GENITOURINARY SURGERY       HC COLONOSCOPY THRU STOMA, DIAGNOSTIC  2004    nl colonoscopy; next due 2014     HEAD & NECK SURGERY       HYSTERECTOMY, PAP NO LONGER INDICATED  approx 1975    hyst/oop: ovarian cyst     LITHOTRIPSY  July 21, 2011     NONSPECIFIC PROCEDURE      eswl     OPEN REDUCTION INTERNAL FIXATION HIP BIPOLAR Left 11/23/2015    Procedure: OPEN REDUCTION INTERNAL FIXATION HIP BIPOLAR;  Surgeon: Matheus Og MD;  Location: [facility] OR     ORTHOPEDIC SURGERY         Social History     Tobacco Use     Smoking status: Passive Smoke Exposure - Never Smoker     Smokeless tobacco: Never Used     Tobacco comment: roommates smoke   Substance Use Topics     Alcohol use: No     Alcohol/week: 0.0 standard drinks     Comment: high ball once a month     Family History   Problem Relation Age of Onset     Breast Cancer Mother      Respiratory Mother      Osteoporosis Mother      Alcohol/Drug Father         cirrhosis     Cancer Maternal Grandmother            Reviewed and updated as needed this visit by Provider         Review of Systems   ROS COMP: Constitutional, cardiovascular, pulmonary, , musculoskeletal, neuro, endocrine and psych systems are negative, except as otherwise noted.      Objective    BP (!) 168/98   Pulse 73   Temp 98.2  F (36.8  C) (Tympanic)   Resp 20   SpO2 95%   There is no height or weight on file to calculate BMI.  Physical Exam    Constitutional: healthy, alert, active, no distress.    Head: Normocephalic   Musculoskeletal: extremities normal- no gross deformities noted, hemiparesis affecting her left side  Skin: no suspicious lesions or rashes appreciated on exposed areas  Neurologic: Gait normal. Moving all extremities spontaneously, no apparent weakness.    Psychiatric: mentation appears normal, thoughts are clear and concise. Converses appropriately. Affect is Appropriate/mood-congruent          Assessment & Plan   Assessment  1. OAB (overactive bladder)    2. Essential hypertension    3. Hypothyroidism, unspecified type    4. Hyperlipidemia LDL goal <100    5. Iron deficiency anemia, unspecified iron deficiency anemia type    6. Age-related cataract of both eyes, unspecified age-related cataract type    7. Hemiparesis affecting left side as late effect of stroke (H)         Plan  1) Start oxybutynin XR 10mg every day. Hopefully this will help with sleep in turn.    2-5) Routine labs today. She did not take her lisinopril today. I'd like to recheck it on a day she has taken her medication.     6,7) Referrals to the eye specialist and hand therapy.      Return in about 6 months (around 5/25/2020) for a med check, repeat labs.    Dior Baez PA-C  [facility]

## 2019-11-25 NOTE — PATIENT INSTRUCTIONS
I'd like to recheck your blood pressure on a day that you've taken your blood pressure medication.

## 2019-11-26 LAB
CHOLEST SERPL-MCNC: 332 MG/DL
HDLC SERPL-MCNC: 44 MG/DL
LDLC SERPL CALC-MCNC: ABNORMAL MG/DL
LDLC SERPL DIRECT ASSAY-MCNC: 203 MG/DL
NONHDLC SERPL-MCNC: 288 MG/DL
TRIGL SERPL-MCNC: 507 MG/DL

## 2019-11-27 ENCOUNTER — TELEPHONE (OUTPATIENT)
Dept: FAMILY MEDICINE | Facility: CLINIC | Age: 74
End: 2019-11-27

## 2019-11-27 DIAGNOSIS — E11.49 TYPE 2 DIABETES MELLITUS WITH OTHER NEUROLOGIC COMPLICATION, WITHOUT LONG-TERM CURRENT USE OF INSULIN (H): Primary | ICD-10-CM

## 2019-11-27 LAB — HBA1C MFR BLD: 10 % (ref 0–5.6)

## 2019-11-27 NOTE — TELEPHONE ENCOUNTER
Left message on voice mail for patient to call clinic. [phone]  Vandana Ramos RN

## 2019-11-27 NOTE — TELEPHONE ENCOUNTER
Please call patient with the following info:    Patient's labs are abnormal - thyroid and cholesterol specifically concerning. How many days per week is she taking her medication?  If she's not taking it regularly, is she going to start? If not, I can remove medications from her med list

## 2019-11-29 NOTE — TELEPHONE ENCOUNTER
SECOND ATTEMPT to contact patient.    Called patient, no answer.   Left message on voice mail for patient to call clinic. [phone]      Micaela Castellanos RN, BSN, PHN

## 2019-12-02 NOTE — TELEPHONE ENCOUNTER
Patient should really come back in to go over her labs. I'd like her to bring in her med bottles so we can go over what she's taking and what she isn't. Send certified letter if unable to reach by phone

## 2019-12-03 NOTE — TELEPHONE ENCOUNTER
Spoke with patient, informed her as below per Dior. Patient states she will need to call back to schedule appointment.

## 2020-01-21 DIAGNOSIS — G25.81 RESTLESS LEG SYNDROME: ICD-10-CM

## 2020-01-21 NOTE — TELEPHONE ENCOUNTER
Patient has NO SHOWED last 2 appointment.   15 day supply given last time  No follow up     BP Readings from Last 3 Encounters:   11/25/19 (!) 180/88   05/28/19 (!) 180/100   05/13/19 150/90     Routing to PCP to advise.     Micaela Castellanos, RN, BSN, PHN

## 2020-01-21 NOTE — TELEPHONE ENCOUNTER
"Requested Prescriptions   Pending Prescriptions Disp Refills     rOPINIRole (REQUIP) 4 MG tablet [Pharmacy Med Name: ROPINIROLE 4MG TABLETS]  Last Written Prescription Date:  11/12/19  Last Fill Quantity: 14,  # refills: 0   Last office visit: 11/25/2019 with prescribing provider:  FIDEL Baez   Future Office Visit:     14 tablet 0     Sig: TAKE 1 TABLET BY MOUTH AT BEDTIME       Antiparkinson's Agents Protocol Failed - 1/21/2020 11:53 AM        Failed - Blood pressure under 140/90 in past 12 months     BP Readings from Last 3 Encounters:   11/25/19 (!) 180/88   05/28/19 (!) 180/100   05/13/19 150/90                 Passed - CBC on record in past 12 months     Recent Labs   Lab Test 11/25/19  1333   WBC 6.9   RBC 4.72   HGB 12.7   HCT 40.4                    Passed - ALT on record in past 12 months         Recent Labs   Lab Test 11/25/19  1333   ALT 18             Passed - Serum Creatinine on file in past 12 months     Recent Labs   Lab Test 11/25/19  1333   CR 1.13*             Passed - Medication is active on med list        Passed - Patient is age 18 or older        Passed - No active pregnancy on record        Passed - No positive pregnancy test in the past 12 months        Passed - Recent (6 mo) or future (30 days) visit within the authorizing provider's specialty     Patient had office visit in the last 6 months or has a visit in the next 30 days with authorizing provider or within the authorizing provider's specialty.  See \"Patient Info\" tab in inbasket, or \"Choose Columns\" in Meds & Orders section of the refill encounter.              "

## 2020-01-22 ENCOUNTER — TELEPHONE (OUTPATIENT)
Dept: FAMILY MEDICINE | Facility: CLINIC | Age: 75
End: 2020-01-22

## 2020-01-22 RX ORDER — ROPINIROLE 4 MG/1
TABLET, FILM COATED ORAL
Qty: 14 TABLET | Refills: 0 | Status: SHIPPED | OUTPATIENT
Start: 2020-01-22 | End: 2020-11-10

## 2020-01-22 NOTE — TELEPHONE ENCOUNTER
Due for appt. 2 weeks given on 1/22/20. Please call to schedule.  Please send letter or call to schedule

## 2020-04-13 DIAGNOSIS — G47.00 INSOMNIA, UNSPECIFIED TYPE: ICD-10-CM

## 2020-04-13 NOTE — TELEPHONE ENCOUNTER
"Requested Prescriptions   Pending Prescriptions Disp Refills     mirtazapine (REMERON) 7.5 MG tablet [Pharmacy Med Name: MIRTAZAPINE 7.5MG TABLETS] 28 tablet 0     Sig: TAKE 1 TO 2 TABLETS(7.5 TO 15 MG) BY MOUTH AT BEDTIME   Last Written Prescription Date:  4-13-20  Last Fill Quantity: 28,  # refills: 0   Last office visit: 11/25/2019 with prescribing provider:  11-25-19   Future Office Visit:      Atypical Antidepressants Protocol Passed - 4/13/2020 11:32 AM        Passed - Recent (12 mo) or future (30 days) visit within the authorizing provider's specialty     Patient has had an office visit with the authorizing provider or a provider within the authorizing providers department within the previous 12 mos or has a future within next 30 days. See \"Patient Info\" tab in inbasket, or \"Choose Columns\" in Meds & Orders section of the refill encounter.              Passed - Medication active on med list        Passed - Patient is age 18 or older        Passed - No active pregnancy on record        Passed - No positive pregnancy test in past 12 mos           "

## 2020-04-14 RX ORDER — MIRTAZAPINE 7.5 MG/1
TABLET, FILM COATED ORAL
Qty: 28 TABLET | Refills: 0 | Status: SHIPPED | OUTPATIENT
Start: 2020-04-14 | End: 2020-09-04

## 2020-04-23 ENCOUNTER — VIRTUAL VISIT (OUTPATIENT)
Dept: FAMILY MEDICINE | Facility: CLINIC | Age: 75
End: 2020-04-23
Payer: MEDICARE

## 2020-04-23 DIAGNOSIS — E11.49 TYPE 2 DIABETES MELLITUS WITH OTHER NEUROLOGIC COMPLICATION, WITHOUT LONG-TERM CURRENT USE OF INSULIN (H): ICD-10-CM

## 2020-04-23 DIAGNOSIS — I69.354 HEMIPARESIS AFFECTING LEFT SIDE AS LATE EFFECT OF STROKE (H): ICD-10-CM

## 2020-04-23 DIAGNOSIS — R21 RASH OF GROIN: Primary | ICD-10-CM

## 2020-04-23 PROCEDURE — 99207 ZZC NON-BILLABLE SERV PER CHARTING: CPT | Performed by: FAMILY MEDICINE

## 2020-04-23 NOTE — PROGRESS NOTES
"Susi Aguila is a 74 year old female who is being evaluated via a billable telephone visit.      The patient has been notified of following:     \"This telephone visit will be conducted via a call between you and your physician/provider. We have found that certain health care needs can be provided without the need for a physical exam.  This service lets us provide the care you need with a short phone conversation.  If a prescription is necessary we can send it directly to your pharmacy.  If lab work is needed we can place an order for that and you can then stop by our lab to have the test done at a later time.    Telephone visits are billed at different rates depending on your insurance coverage. During this emergency period, for some insurers they may be billed the same as an in-person visit.  Please reach out to your insurance provider with any questions.    If during the course of the call the physician/provider feels a telephone visit is not appropriate, you will not be charged for this service.\"    Patient has given verbal consent for Telephone visit?  Yes    How would you like to obtain your AVS? Mail a copy  3:31 PM-start visit  3:43 PM.end         Subjective     Susi Aguila is a 74 year old female who presents to clinic today for the following health issues:    HPI  Rash      Duration: 3 weeks but has been getting worse ; PCA informs feels like may need to go to ER and PCA will be able to bring patient into ER    Has Urinary Incontinence for several years    Description  Location: vaginal  Itching: severe    Intensity:  severe    Accompanying signs and symptoms: Very red and chafed, painful, fatigued     History (similar episodes/previous evaluation): None    Precipitating or alleviating factors: Incontinence   New exposures:  None  Recent travel: no      Therapies tried and outcome: A&D and Vaseline ; helped a little but got worse     Pt has Known Diabetes-Pt does not know and says she has not been " checking  Blood sugars have been very High in the past  Pt states she does not feel she has Diabetes  Her PCA thinks rash is very bad and pt needs to be seen Today        Patient Active Problem List   Diagnosis     Essential hypertension     Esophageal reflux     Anxiety state     Stricture and stenosis of esophagus     Cerebral aneurysm     HYPERLIPIDEMIA LDL GOAL <100     ACP (advance care planning)     Sciatica     Tailor's bunion     Dry eye syndrome     Restless leg syndrome     Insomnia     Homonymous bilateral field defects in visual field     Arteriovenous malformation     Mixed incontinence     Hypothyroidism, unspecified type     Proteinuria     Advanced directives, counseling/discussion     Neuropathy     Type 2 diabetes mellitus with other neurologic complication, without long-term current use of insulin (H)     Moderate major depression (H)     Iron deficiency anemia, unspecified iron deficiency anemia type     Hemiparesis affecting left side as late effect of stroke (H)     S/P resection of meningioma     CKD (chronic kidney disease) stage 3, GFR 30-59 ml/min (H)     Past Surgical History:   Procedure Laterality Date     AMPUTATE TOE(S)  9/18/2012    Procedure: AMPUTATE TOE(S);  RIGHT FIFTH TOE AMPUTATION ;  Surgeon: Jayme Garcia DPM;  Location: [facility]     benign meningioma   03/19/2018    benign meningioma removal      BRAIN SURGERY      x's 3     BUNIONECTOMY  9/3/2013    Procedure: BUNIONECTOMY;  RIGHT FOOT SIMPLE LEYDI'S BUNIONECTOMY ;  Surgeon: Jayme Garcia DPM;  Location: [facility]     C NONSPECIFIC PROCEDURE  1998;     meningioma     C NONSPECIFIC PROCEDURE  1998    coil procedures, cerebral aneursyms     C NONSPECIFIC PROCEDURE  2001    intra-extracranial aneursym bypass     C NONSPECIFIC PROCEDURE  approx 8022-5795    plastic op/congenital facial abn     C NONSPECIFIC PROCEDURE  approx 1983    ooph     C NONSPECIFIC PROCEDURE  approx 1997    gb     C NONSPECIFIC PROCEDURE  approx  2001    cystocele repair     C NONSPECIFIC PROCEDURE  2005    hardware removal, skull     C NONSPECIFIC PROCEDURE  2006    upper endoscopy; dilatation     C NONSPECIFIC PROCEDURE  2007    neg cystoscopy     C NONSPECIFIC PROCEDURE  2008    cerebral aneursym     C NONSPECIFIC PROCEDURE  2011     Hammertoe reconstruction with exostectomy fifth digit, right foot.     CHOLECYSTECTOMY       COMBINED CYSTOSCOPY, RETROGRADES, URETEROSCOPY, INSERT STENT  1/17/2014    Procedure: COMBINED CYSTOSCOPY, RETROGRADES, URETEROSCOPY, INSERT STENT;  Cystoscopy, Right Retrograde, Right Ureteroscopy;  Surgeon: Db Chase MD;  Location:  OR     COMBINED CYSTOSCOPY, RETROGRADES, URETEROSCOPY, LASER HOLMIUM LITHOTRIPSY URETER(S), INSERT STENT Left 4/7/2015    Procedure: COMBINED CYSTOSCOPY, RETROGRADES, URETEROSCOPY, LASER HOLMIUM LITHOTRIPSY URETER(S), INSERT STENT;  Surgeon: Db Chase MD;  Location:  OR     CYSTOSCOPY, RETROGRADES, INSERT STENT URETER(S), COMBINED Left 3/2/2015    Procedure: COMBINED CYSTOSCOPY, RETROGRADES, INSERT STENT URETER(S);  Surgeon: Db Chase MD;  Location:  OR     GENITOURINARY SURGERY       HC COLONOSCOPY THRU STOMA, DIAGNOSTIC  2004    nl colonoscopy; next due 2014     HEAD & NECK SURGERY       HYSTERECTOMY, PAP NO LONGER INDICATED  approx 1975    hyst/oop: ovarian cyst     LITHOTRIPSY  July 21, 2011     NONSPECIFIC PROCEDURE      eswl     OPEN REDUCTION INTERNAL FIXATION HIP BIPOLAR Left 11/23/2015    Procedure: OPEN REDUCTION INTERNAL FIXATION HIP BIPOLAR;  Surgeon: Matheus Og MD;  Location:  OR     ORTHOPEDIC SURGERY         Social History     Tobacco Use     Smoking status: Passive Smoke Exposure - Never Smoker     Smokeless tobacco: Never Used     Tobacco comment: roommates smoke   Substance Use Topics     Alcohol use: No     Alcohol/week: 0.0 standard drinks     Comment: high ball once a month     Family History   Problem Relation Age of Onset      Breast Cancer Mother      Respiratory Mother      Osteoporosis Mother      Alcohol/Drug Father         cirrhosis     Cancer Maternal Grandmother          Current Outpatient Medications   Medication Sig Dispense Refill     aspirin 81 MG tablet Take 1 tablet (81 mg) by mouth daily 90 tablet 3     atorvastatin (LIPITOR) 40 MG tablet TAKE 1 TABLET BY MOUTH EVERY DAY. 90 tablet 0     blood glucose (NO BRAND SPECIFIED) test strip Use to test blood sugar 1 times daily or as directed. To accompany: Blood Glucose Monitor Brands: per insurance. 100 strip 6     blood glucose calibration (NO BRAND SPECIFIED) solution To accompany: Blood Glucose Monitor Brands: per insurance. 1 Bottle 3     blood glucose monitoring (NO BRAND SPECIFIED) meter device kit Use to test blood sugar 1 times daily or as directed. Preferred blood glucose meter OR supplies to accompany: Blood Glucose Monitor Brands: per insurance. 1 kit 0     ferrous sulfate (FEROSUL) 325 (65 Fe) MG tablet Take 1 tablet (325 mg) by mouth 2 times daily (with meals) 180 tablet 3     levothyroxine (SYNTHROID/LEVOTHROID) 88 MCG tablet Take 1 tablet (88 mcg) by mouth daily 90 tablet 3     lisinopril (PRINIVIL/ZESTRIL) 40 MG tablet Take 1 tablet (40 mg) by mouth daily 30 tablet 1     metFORMIN (GLUCOPHAGE) 1000 MG tablet Take 1,000 mg by mouth       mirtazapine (REMERON) 7.5 MG tablet TAKE 1 TO 2 TABLETS(7.5 TO 15 MG) BY MOUTH AT BEDTIME 28 tablet 0     naproxen (NAPROSYN) 500 MG tablet TAKE 1 TABLET(500 MG) BY MOUTH TWICE DAILY WITH MEALS AS NEEDED FOR PAIN 180 tablet 2     omeprazole (PRILOSEC OTC) 20 MG EC tablet Take 1 tablet (20 mg) by mouth 2 times daily (before meals) 180 tablet 1     oxybutynin ER (DITROPAN-XL) 10 MG 24 hr tablet Take 1 tablet (10 mg) by mouth daily 90 tablet 1     ranitidine (ZANTAC) 150 MG tablet Take 1 tablet (150 mg) by mouth 2 times daily 180 tablet 0     rOPINIRole (REQUIP) 4 MG tablet TAKE 1 TABLET BY MOUTH AT BEDTIME 14 tablet 0     thin (NO  BRAND SPECIFIED) lancets Use with lanceting device. To accompany: Blood Glucose Monitor Brands: per insurance. 100 each 6     tiZANidine (ZANAFLEX) 2 MG tablet Take 1-2 tablets (2-4 mg) by mouth At Bedtime 28 tablet 0     alcohol swab prep pads Use to swab area of injection/meliton as directed. 100 each 3     tacrolimus (PROTOPIC) 0.1 % ointment Apply topically 2 times daily (under right eye)       Allergies   Allergen Reactions     Amoxicillin      itchy     Penicillins Itching     Recent Labs   Lab Test 11/25/19  1333 05/13/19  1147 12/12/18  1051 06/15/18  0859  03/08/18  0953   A1C 10.0* 7.1* 6.8* 6.6*  --   --    LDL Cannot estimate LDL when triglyceride exceeds 400 mg/dL  203* 207*  --  81  --   --    HDL 44* 54  --  67  --   --    TRIG 507* 299*  --  99  --   --    ALT 18 17  --   --   --  25   CR 1.13* 1.32* 0.99  --    < > 0.89   GFRESTIMATED 48* 40* 55*  --    < > 62   GFRESTBLACK 56* 46* 67  --    < > 75   POTASSIUM 3.8 3.7 4.0  --    < > 3.9   TSH 76.23* 85.40*  --   --    < > 0.03*    < > = values in this interval not displayed.      BP Readings from Last 3 Encounters:   11/25/19 (!) 180/88   05/28/19 (!) 180/100   05/13/19 150/90    Wt Readings from Last 3 Encounters:   01/09/19 71.8 kg (158 lb 4.8 oz)   08/09/18 66.7 kg (147 lb)   07/09/18 67.1 kg (148 lb)                    Reviewed and updated as needed this visit by Provider         Review of Systems   ROS COMP:   No other significant past medical history or family history. Fever or chills  Rash groin  Dabetes out of control-never checks  Rest of the ROS is Negative except see above and Problem list [stable]         Objective   Reported vitals:  There were no vitals taken for this visit.   alert and no distress  PSYCH: Alert and oriented times 3; coherent speech, normal   rate and volume, able to articulate logical thoughts, able   to abstract reason, no tangential thoughts, no hallucinations   or delusions  Her affect is pleasant  RESP: No cough,  no audible wheezing, able to talk in full sentences  Remainder of exam unable to be completed due to telephone visits    Diagnostic Test Results:  Labs reviewed in Epic        Assessment/Plan:    ICD-10-CM    1. Rash of groin  R21    2. Type 2 diabetes mellitus with other neurologic complication, without long-term current use of insulin (H)  E11.49    3. Hemiparesis affecting left side as late effect of stroke (H)  I69.354      Pt has History of uncontrolled Diabetes  Does not check accuchecks and does not watch Diet  Rash very bad and PCA thinks that pt needs to be seen  Advised To go to Urgent care Today or be seen in clinic        Phone call duration:  As above minutes    Stella Aguilar MD

## 2020-04-27 ENCOUNTER — VIRTUAL VISIT (OUTPATIENT)
Dept: FAMILY MEDICINE | Facility: CLINIC | Age: 75
End: 2020-04-27
Payer: COMMERCIAL

## 2020-04-27 DIAGNOSIS — E11.49 TYPE 2 DIABETES MELLITUS WITH OTHER NEUROLOGIC COMPLICATION, WITHOUT LONG-TERM CURRENT USE OF INSULIN (H): ICD-10-CM

## 2020-04-27 DIAGNOSIS — N30.00 ACUTE CYSTITIS WITHOUT HEMATURIA: Primary | ICD-10-CM

## 2020-04-27 DIAGNOSIS — B37.2 CANDIDAL INTERTRIGO: ICD-10-CM

## 2020-04-27 PROCEDURE — 99442 ZZC PHYSICIAN TELEPHONE EVALUATION 11-20 MIN: CPT | Performed by: PHYSICIAN ASSISTANT

## 2020-04-27 RX ORDER — NYSTATIN 100000 [USP'U]/G
POWDER TOPICAL 2 TIMES DAILY
Qty: 1 BOTTLE | Refills: 1 | Status: SHIPPED | OUTPATIENT
Start: 2020-04-27 | End: 2020-12-21

## 2020-04-27 RX ORDER — CEPHALEXIN 500 MG/1
500 CAPSULE ORAL
COMMUNITY
Start: 2020-04-24 | End: 2020-05-04

## 2020-04-27 RX ORDER — CLOTRIMAZOLE AND BETAMETHASONE DIPROPIONATE 10; .64 MG/G; MG/G
CREAM TOPICAL
COMMUNITY
Start: 2020-04-24

## 2020-04-27 NOTE — PROGRESS NOTES
"Susi Aguila is a 74 year old female who is being evaluated via a billable telephone visit.      The patient has been notified of following:     \"This telephone visit will be conducted via a call between you and your physician/provider. We have found that certain health care needs can be provided without the need for a physical exam.  This service lets us provide the care you need with a short phone conversation.  If a prescription is necessary we can send it directly to your pharmacy.  If lab work is needed we can place an order for that and you can then stop by our lab to have the test done at a later time.    Telephone visits are billed at different rates depending on your insurance coverage. During this emergency period, for some insurers they may be billed the same as an in-person visit.  Please reach out to your insurance provider with any questions.    If during the course of the call the physician/provider feels a telephone visit is not appropriate, you will not be charged for this service.\"    Patient has given verbal consent for Telephone visit?  Yes    How would you like to obtain your AVS? Mail a copy    Subjective     Susi Aguila is a 74 year old female who presents to clinic today for the following health issues:    HPI  ED/UC Followup:    Facility:  [facility]  Date of visit: 04/24/2020  Reason for visit: Rash  Current Status: improving, not as painful      Ran out of the topical cream already, didn't give her enough  Still taking Keflex, still experiencing increase urinary frequency          Patient Active Problem List   Diagnosis     Essential hypertension     Esophageal reflux     Anxiety state     Stricture and stenosis of esophagus     Cerebral aneurysm     HYPERLIPIDEMIA LDL GOAL <100     ACP (advance care planning)     Sciatica     Tailor's bunion     Dry eye syndrome     Restless leg syndrome     Insomnia     Homonymous bilateral field defects in visual field     Arteriovenous " malformation     Mixed incontinence     Hypothyroidism, unspecified type     Proteinuria     Advanced directives, counseling/discussion     Neuropathy     Type 2 diabetes mellitus with other neurologic complication, without long-term current use of insulin (H)     Moderate major depression (H)     Iron deficiency anemia, unspecified iron deficiency anemia type     Hemiparesis affecting left side as late effect of stroke (H)     S/P resection of meningioma     CKD (chronic kidney disease) stage 3, GFR 30-59 ml/min (H)     Past Surgical History:   Procedure Laterality Date     AMPUTATE TOE(S)  9/18/2012    Procedure: AMPUTATE TOE(S);  RIGHT FIFTH TOE AMPUTATION ;  Surgeon: Jayme Garcia DPM;  Location:  [facility]     benign meningioma   03/19/2018    benign meningioma removal      BRAIN SURGERY      x's 3     BUNIONECTOMY  9/3/2013    Procedure: BUNIONECTOMY;  RIGHT FOOT SIMPLE LEYDI'S BUNIONECTOMY ;  Surgeon: Jayme Garcia DPM;  Location:  OR     C NONSPECIFIC PROCEDURE  1998;     meningioma     C NONSPECIFIC PROCEDURE  1998    coil procedures, cerebral aneursyms     C NONSPECIFIC PROCEDURE  2001    intra-extracranial aneursym bypass     C NONSPECIFIC PROCEDURE  approx 6331-3716    plastic op/congenital facial abn     C NONSPECIFIC PROCEDURE  approx 1983    ooph     C NONSPECIFIC PROCEDURE  approx 1997    gb     C NONSPECIFIC PROCEDURE  approx 2001    cystocele repair     C NONSPECIFIC PROCEDURE  2005    hardware removal, skull     C NONSPECIFIC PROCEDURE  2006    upper endoscopy; dilatation     C NONSPECIFIC PROCEDURE  2007    neg cystoscopy     C NONSPECIFIC PROCEDURE  2008    cerebral aneursym     C NONSPECIFIC PROCEDURE  2011     Hammertoe reconstruction with exostectomy fifth digit, right foot.     CHOLECYSTECTOMY       COMBINED CYSTOSCOPY, RETROGRADES, URETEROSCOPY, INSERT STENT  1/17/2014    Procedure: COMBINED CYSTOSCOPY, RETROGRADES, URETEROSCOPY, INSERT STENT;  Cystoscopy, Right Retrograde, Right  Ureteroscopy;  Surgeon: Db Chase MD;  Location: [facility]     COMBINED CYSTOSCOPY, RETROGRADES, URETEROSCOPY, LASER HOLMIUM LITHOTRIPSY URETER(S), INSERT STENT Left 4/7/2015    Procedure: COMBINED CYSTOSCOPY, RETROGRADES, URETEROSCOPY, LASER HOLMIUM LITHOTRIPSY URETER(S), INSERT STENT;  Surgeon: Db Chase MD;  Location:  OR     CYSTOSCOPY, RETROGRADES, INSERT STENT URETER(S), COMBINED Left 3/2/2015    Procedure: COMBINED CYSTOSCOPY, RETROGRADES, INSERT STENT URETER(S);  Surgeon: Db Chase MD;  Location: [facility]     GENITOURINARY SURGERY       HC COLONOSCOPY THRU STOMA, DIAGNOSTIC  2004    nl colonoscopy; next due 2014     HEAD & NECK SURGERY       HYSTERECTOMY, PAP NO LONGER INDICATED  approx 1975    hyst/oop: ovarian cyst     LITHOTRIPSY  July 21, 2011     NONSPECIFIC PROCEDURE      eswl     OPEN REDUCTION INTERNAL FIXATION HIP BIPOLAR Left 11/23/2015    Procedure: OPEN REDUCTION INTERNAL FIXATION HIP BIPOLAR;  Surgeon: Matheus Og MD;  Location: [facility]     ORTHOPEDIC SURGERY         Social History     Tobacco Use     Smoking status: Passive Smoke Exposure - Never Smoker     Smokeless tobacco: Never Used     Tobacco comment: roommates smoke   Substance Use Topics     Alcohol use: No     Alcohol/week: 0.0 standard drinks     Comment: high ball once a month     Family History   Problem Relation Age of Onset     Breast Cancer Mother      Respiratory Mother      Osteoporosis Mother      Alcohol/Drug Father         cirrhosis     Cancer Maternal Grandmother            Reviewed and updated as needed this visit by Provider         Review of Systems   ROS COMP: Constitutional, skin, and gu systems are negative, except as otherwise noted.       Objective   Reported vitals:  There were no vitals taken for this visit.   alert and no distress  PSYCH: Alert and oriented times 3; coherent speech, normal   rate and volume, able to articulate logical thoughts, able   to abstract reason, no  tangential thoughts, no hallucinations   or delusions  Her affect is normal and pleasant  RESP: No cough, no audible wheezing, able to talk in full sentences  Remainder of exam unable to be completed due to telephone visits        Assessment/Plan:  1. Acute cystitis without hematuria    2. Candidal intertrigo    3. Type 2 diabetes mellitus with other neurologic complication, without long-term current use of insulin (H)         1) Continue Keflex. Urine culture reviewed and susceptible to cephalosporins. Follow up if symptoms fail to improve or worsen.     2) patient ran out of her cream prescription. Will have her start Nystatin powder BID x2 weeks.     3) Hyperglycemia in ED and previous A1c above goal. Metformin increased to 1000mg BID. Patient admits to missing doses. Will follow up for a repeat A1c in 8 weeks.      Return in about 2 months (around 6/27/2020) for diabetes follow up and repeat A1c.      Phone call duration:  11 minutes    Dior Baez PA-C

## 2020-06-08 ENCOUNTER — TELEPHONE (OUTPATIENT)
Dept: FAMILY MEDICINE | Facility: CLINIC | Age: 75
End: 2020-06-08

## 2020-06-08 NOTE — TELEPHONE ENCOUNTER
Demar Og CMA contacted Susi on 06/08/20 and left a message. If patient calls back please schedule Face to Face appointment with PCP for Diabetes f/u

## 2020-06-08 NOTE — LETTER
June 18, 2020      Susi Aguila  [address]   [place] [address]        Dear Susi,     In order to ensure we are providing the best quality care, we have reviewed your chart and see that you are due for the following.    1. Your next office visit is due for Diabetes  2. Fasting labs      For fasting labs please fast for at least 10 hours. You can still take your medications and have water.    We greatly appreciate the opportunity to serve you.  Thank you for trusting us with your health care.    If you are now being seen elsewhere please let us know and we will remove you from the list.              Sincerely,              [facility]

## 2020-06-10 NOTE — TELEPHONE ENCOUNTER
Demar Og CMA contacted Susi on 06/10/20 and left a message. If patient calls back please schedule Face to face appointment with PCP for Diabetes.

## 2020-06-11 NOTE — TELEPHONE ENCOUNTER
Demar Og CMA contacted Susi on 06/11/20 and left a message. If patient calls back please schedule appointment for Diabetes f/u with PCP.

## 2020-06-18 NOTE — TELEPHONE ENCOUNTER
Demar Og CMA contacted Susi on 06/18/20 and left a message. If patient calls back please schedule appointment for diabetes.      Mailed letter

## 2020-09-01 DIAGNOSIS — G47.00 INSOMNIA, UNSPECIFIED TYPE: ICD-10-CM

## 2020-09-04 RX ORDER — MIRTAZAPINE 7.5 MG/1
TABLET, FILM COATED ORAL
Qty: 28 TABLET | Refills: 0 | Status: SHIPPED | OUTPATIENT
Start: 2020-09-04 | End: 2020-11-13

## 2020-09-09 ENCOUNTER — TELEPHONE (OUTPATIENT)
Dept: FAMILY MEDICINE | Facility: CLINIC | Age: 75
End: 2020-09-09

## 2020-09-09 NOTE — TELEPHONE ENCOUNTER
Central Prior Authorization Team   Phone: [phone]      PA Initiation    Medication: mirtazapine (REMERON) 7.5 MG tablet  Insurance Company: [employer] - Phone [phone] Fax [phone]  Pharmacy Filling the Rx: [id number] - [place], [place] - [address] AT [facility]  Filling Pharmacy Phone: [phone]  Filling Pharmacy Fax:    Start Date: 9/9/2020

## 2020-09-09 NOTE — TELEPHONE ENCOUNTER
Prior Authorization Retail Medication Request    Medication/Dose: mirtazapine (REMERON) 7.5 MG tablet  ICD code (if different than what is on RX):  G47.00  Previously Tried and Failed:    Rationale:      Insurance Name:  [employer]   Insurance ID: [id number]      Pharmacy Information (if different than what is on RX)  Name:  [facility]  Phone:  [phone]

## 2020-09-09 NOTE — TELEPHONE ENCOUNTER
Prior Authorization Approval    Authorization Effective Date: 2/1/2020  Authorization Expiration Date: 12/31/2020  Medication: mirtazapine (REMERON) 7.5 MG tablet  Approved Dose/Quantity:    Reference #:     Insurance Company: [email or web address] - Phone [phone] Fax [phone]  Expected CoPay:       CoPay Card Available:      Foundation Assistance Needed:    Which Pharmacy is filling the prescription (Not needed for infusion/clinic administered): [id number] - [place] [address] AT [facility]  Pharmacy Notified: Yes  Patient Notified: Yes  **Instructed pharmacy to notify patient when script is ready to /ship.**

## 2020-09-15 ENCOUNTER — OFFICE VISIT (OUTPATIENT)
Dept: FAMILY MEDICINE | Facility: CLINIC | Age: 75
End: 2020-09-15
Payer: COMMERCIAL

## 2020-09-15 ENCOUNTER — TELEPHONE (OUTPATIENT)
Dept: FAMILY MEDICINE | Facility: CLINIC | Age: 75
End: 2020-09-15

## 2020-09-15 VITALS
RESPIRATION RATE: 16 BRPM | HEART RATE: 70 BPM | OXYGEN SATURATION: 95 % | DIASTOLIC BLOOD PRESSURE: 93 MMHG | TEMPERATURE: 96.9 F | SYSTOLIC BLOOD PRESSURE: 165 MMHG

## 2020-09-15 DIAGNOSIS — Z12.31 ENCOUNTER FOR SCREENING MAMMOGRAM FOR BREAST CANCER: ICD-10-CM

## 2020-09-15 DIAGNOSIS — E03.9 HYPOTHYROIDISM, UNSPECIFIED TYPE: ICD-10-CM

## 2020-09-15 DIAGNOSIS — E78.5 HYPERLIPIDEMIA LDL GOAL <100: ICD-10-CM

## 2020-09-15 DIAGNOSIS — I10 ESSENTIAL HYPERTENSION: ICD-10-CM

## 2020-09-15 DIAGNOSIS — Z12.11 COLON CANCER SCREENING: ICD-10-CM

## 2020-09-15 DIAGNOSIS — E11.49 TYPE 2 DIABETES MELLITUS WITH OTHER NEUROLOGIC COMPLICATION, WITHOUT LONG-TERM CURRENT USE OF INSULIN (H): Primary | ICD-10-CM

## 2020-09-15 DIAGNOSIS — K21.9 GASTROESOPHAGEAL REFLUX DISEASE, ESOPHAGITIS PRESENCE NOT SPECIFIED: ICD-10-CM

## 2020-09-15 LAB
ANION GAP SERPL CALCULATED.3IONS-SCNC: 7 MMOL/L (ref 3–14)
BUN SERPL-MCNC: 16 MG/DL (ref 7–30)
CALCIUM SERPL-MCNC: 8.9 MG/DL (ref 8.5–10.1)
CHLORIDE SERPL-SCNC: 100 MMOL/L (ref 94–109)
CHOLEST SERPL-MCNC: 304 MG/DL
CO2 SERPL-SCNC: 26 MMOL/L (ref 20–32)
CREAT SERPL-MCNC: 1.07 MG/DL (ref 0.52–1.04)
GFR SERPL CREATININE-BSD FRML MDRD: 51 ML/MIN/{1.73_M2}
GLUCOSE SERPL-MCNC: 412 MG/DL (ref 70–99)
HBA1C MFR BLD: 14.1 % (ref 0–5.6)
HDLC SERPL-MCNC: 43 MG/DL
LDLC SERPL CALC-MCNC: ABNORMAL MG/DL
LDLC SERPL DIRECT ASSAY-MCNC: 205 MG/DL
NONHDLC SERPL-MCNC: 261 MG/DL
POTASSIUM SERPL-SCNC: 3.9 MMOL/L (ref 3.4–5.3)
SODIUM SERPL-SCNC: 133 MMOL/L (ref 133–144)
TRIGL SERPL-MCNC: 647 MG/DL
TSH SERPL DL<=0.005 MIU/L-ACNC: 71.7 MU/L (ref 0.4–4)

## 2020-09-15 PROCEDURE — 84439 ASSAY OF FREE THYROXINE: CPT | Performed by: PHYSICIAN ASSISTANT

## 2020-09-15 PROCEDURE — 90662 IIV NO PRSV INCREASED AG IM: CPT | Performed by: PHYSICIAN ASSISTANT

## 2020-09-15 PROCEDURE — 83721 ASSAY OF BLOOD LIPOPROTEIN: CPT | Mod: 59 | Performed by: PHYSICIAN ASSISTANT

## 2020-09-15 PROCEDURE — 83036 HEMOGLOBIN GLYCOSYLATED A1C: CPT | Performed by: PHYSICIAN ASSISTANT

## 2020-09-15 PROCEDURE — 99214 OFFICE O/P EST MOD 30 MIN: CPT | Mod: 25 | Performed by: PHYSICIAN ASSISTANT

## 2020-09-15 PROCEDURE — 36415 COLL VENOUS BLD VENIPUNCTURE: CPT | Performed by: PHYSICIAN ASSISTANT

## 2020-09-15 PROCEDURE — 80061 LIPID PANEL: CPT | Performed by: PHYSICIAN ASSISTANT

## 2020-09-15 PROCEDURE — G0008 ADMIN INFLUENZA VIRUS VAC: HCPCS | Performed by: PHYSICIAN ASSISTANT

## 2020-09-15 PROCEDURE — 80048 BASIC METABOLIC PNL TOTAL CA: CPT | Performed by: PHYSICIAN ASSISTANT

## 2020-09-15 PROCEDURE — 84443 ASSAY THYROID STIM HORMONE: CPT | Performed by: PHYSICIAN ASSISTANT

## 2020-09-15 RX ORDER — FAMOTIDINE 40 MG/1
40 TABLET, FILM COATED ORAL DAILY
Qty: 180 TABLET | Refills: 3 | Status: SHIPPED | OUTPATIENT
Start: 2020-09-15 | End: 2020-09-23

## 2020-09-15 NOTE — PROGRESS NOTES
Subjective     Susi Aguila is a 74 year old female who presents to clinic today for the following health issues:    HPI       Diabetes Follow-up      How often are you checking your blood sugar? Not at all    What concerns do you have today about your diabetes? None     Do you have any of these symptoms? (Select all that apply)  Excessive thirst    Have you had a diabetic eye exam in the last 12 months? Yes-  Location: [facility]     Excessive thirst, urinating frequently      BP Readings from Last 2 Encounters:   09/15/20 (!) 165/93   11/25/19 (!) 180/88     Hemoglobin A1C (%)   Date Value   11/25/2019 10.0 (H)   05/13/2019 7.1 (H)     LDL Cholesterol Calculated (mg/dL)   Date Value   11/25/2019     Cannot estimate LDL when triglyceride exceeds 400 mg/dL   05/13/2019 207 (H)     LDL Cholesterol Direct (mg/dL)   Date Value   11/25/2019 203 (H)               Hyperlipidemia Follow-Up      Are you regularly taking any medication or supplement to lower your cholesterol?   Yes- atorvastatin    Are you having muscle aches or other side effects that you think could be caused by your cholesterol lowering medication?  No    Hypertension Follow-up      Do you check your blood pressure regularly outside of the clinic? No     Are you following a low salt diet? Yes    Are your blood pressures ever more than 140 on the top number (systolic) OR more   than 90 on the bottom number (diastolic), for example 140/90? Not sure      How many servings of fruits and vegetables do you eat daily?  0-1    On average, how many sweetened beverages do you drink each day (Examples: soda, juice, sweet tea, etc.  Do NOT count diet or artificially sweetened beverages)?   3    How many days per week do you exercise enough to make your heart beat faster? none    How many minutes a day do you exercise enough to make your heart beat faster? none  How many days per week do you miss taking your medication? 3    What makes it hard for you to take your  "medications?  remembering to take      Review of Systems   Constitutional, cardiovascular, GI, endocrine and psych systems are negative, except as otherwise noted.      Objective    BP (!) 165/93   Pulse 70   Temp 96.9  F (36.1  C) (Tympanic)   Resp 16   SpO2 95%   There is no height or weight on file to calculate BMI.  Physical Exam   GENERAL: healthy, alert and no distress  RESP: lungs clear to auscultation - no rales, rhonchi or wheezes  CV: regular rates and rhythm, normal S1 S2, no S3 or S4 and no murmur, click or rub  MS: no gross musculoskeletal defects noted, no edema  SKIN: no suspicious lesions or rashes  NEURO: hemiplegia right side  PSYCH: mentation appears normal, affect normal/bright        Assessment & Plan     1. Type 2 diabetes mellitus with other neurologic complication, without long-term current use of insulin (H)    2. Hypothyroidism, unspecified type    3. Hyperlipidemia LDL goal <100    4. Essential hypertension    5. Gastroesophageal reflux disease, esophagitis presence not specified    6. Encounter for screening mammogram for breast cancer    7. Colon cancer screening        1-4) She reports taking her medications pretty regularly. I asked her if she could ask her \"caregiver\" to help remind her to take them. Routine labs today. Will follow up with a telephone visit next week to review her labs. Will also discuss changing her antihypertensive regimen at that time. Repeat blood pressure elevated.     5) Stop Zantac. Start Pepcid 40mg BID.     6,7) Screenings discussed       Return in about 1 week (around 9/22/2020) for follow up for labs, a virtual visit (tele/video).    Dior Baez PA-C  [facility]    "

## 2020-09-16 LAB — T4 FREE SERPL-MCNC: 0.57 NG/DL (ref 0.76–1.46)

## 2020-09-16 NOTE — TELEPHONE ENCOUNTER
Received a critical lab value page of a glucose of 412. Cr reported as normal. I called patient and she is feeling well. A1c came back at 14. She misses her metformin frequently. No other diabetes medications listed.     Given she is feeling well I recommended no sugary beverages or snacks for the remainder of the night and discussing this with her PCP tomorrow about how to get her blood sugar under control and her A1c down.    Patient is understanding of the plan and will if develops new symptoms.    Antwon Guajardo MD  [facility]

## 2020-09-18 ENCOUNTER — TELEPHONE (OUTPATIENT)
Dept: FAMILY MEDICINE | Facility: CLINIC | Age: 75
End: 2020-09-18

## 2020-09-18 NOTE — TELEPHONE ENCOUNTER
Prior Authorization Retail Medication Request    Medication/Dose: omeprazole (PRILOSEC OTC) 20 MG EC tablet     ICD code (if different than what is on RX):  Gastroesophageal reflux disease, esophagitis presence not specified [K21.9]     Previously Tried and Failed:    Rationale:      Insurance Name: [employer]VANTAGE   Insurance ID:  [id number]       Pharmacy Information (if different than what is on RX)  Name:    Phone:

## 2020-09-18 NOTE — TELEPHONE ENCOUNTER
PRIOR AUTHORIZATION DENIED    Medication: omeprazole (PRILOSEC OTC) 20 MG-DENIED    Denial Date: 9/18/2020    Denial Rational: MEDICATION IS EXCLUDED FROM COVERAGE.  MEDICARE DOES NOT COVER OTC MEDICATIONS.        Appeal Information:  IF YOU WOULD LIKE TO APPEAL PLEASE SUPPLY PA TEAM WITH A LETTER OF MEDICAL NECESSITY WITH CLINICAL REASON.

## 2020-09-18 NOTE — TELEPHONE ENCOUNTER
Central Prior Authorization Team   Phone: [phone]    PA Initiation    Medication: omeprazole (PRILOSEC OTC) 20 MG EC tablet   Insurance Company: [employer] - Phone [phone] Fax [phone]  Pharmacy Filling the Rx: [id number] - [place], [place] - [address] AT [facility]  Filling Pharmacy Phone: [phone]  Filling Pharmacy Fax: [phone]  Start Date: 9/18/2020

## 2020-09-23 ENCOUNTER — VIRTUAL VISIT (OUTPATIENT)
Dept: FAMILY MEDICINE | Facility: CLINIC | Age: 75
End: 2020-09-23
Payer: COMMERCIAL

## 2020-09-23 DIAGNOSIS — E03.9 HYPOTHYROIDISM, UNSPECIFIED TYPE: ICD-10-CM

## 2020-09-23 DIAGNOSIS — E78.5 HYPERLIPIDEMIA LDL GOAL <100: ICD-10-CM

## 2020-09-23 DIAGNOSIS — K21.9 GASTROESOPHAGEAL REFLUX DISEASE, ESOPHAGITIS PRESENCE NOT SPECIFIED: ICD-10-CM

## 2020-09-23 DIAGNOSIS — E11.49 TYPE 2 DIABETES MELLITUS WITH OTHER NEUROLOGIC COMPLICATION, WITHOUT LONG-TERM CURRENT USE OF INSULIN (H): Primary | ICD-10-CM

## 2020-09-23 PROCEDURE — 99214 OFFICE O/P EST MOD 30 MIN: CPT | Mod: 95 | Performed by: PHYSICIAN ASSISTANT

## 2020-09-23 RX ORDER — ATORVASTATIN CALCIUM 40 MG/1
40 TABLET, FILM COATED ORAL DAILY
Qty: 90 TABLET | Refills: 0 | Status: SHIPPED | OUTPATIENT
Start: 2020-09-23

## 2020-09-23 RX ORDER — FAMOTIDINE 40 MG/1
40 TABLET, FILM COATED ORAL 2 TIMES DAILY
Qty: 180 TABLET | Refills: 3 | Status: SHIPPED | OUTPATIENT
Start: 2020-09-23

## 2020-09-23 RX ORDER — INSULIN GLARGINE 100 [IU]/ML
INJECTION, SOLUTION SUBCUTANEOUS
Qty: 15 ML | Refills: 0 | Status: SHIPPED | OUTPATIENT
Start: 2020-09-23 | End: 2020-11-10

## 2020-09-23 RX ORDER — LEVOTHYROXINE SODIUM 88 UG/1
88 TABLET ORAL DAILY
Qty: 90 TABLET | Refills: 0 | Status: SHIPPED | OUTPATIENT
Start: 2020-09-23

## 2020-09-23 NOTE — PROGRESS NOTES
"Susi Aguila is a 74 year old female who is being evaluated via a billable telephone visit.      The patient has been notified of following:     \"This telephone visit will be conducted via a call between you and your physician/provider. We have found that certain health care needs can be provided without the need for a physical exam.  This service lets us provide the care you need with a short phone conversation.  If a prescription is necessary we can send it directly to your pharmacy.  If lab work is needed we can place an order for that and you can then stop by our lab to have the test done at a later time.    Telephone visits are billed at different rates depending on your insurance coverage. During this emergency period, for some insurers they may be billed the same as an in-person visit.  Please reach out to your insurance provider with any questions.    If during the course of the call the physician/provider feels a telephone visit is not appropriate, you will not be charged for this service.\"    Patient has given verbal consent for Telephone visit?  Yes    What phone number would you like to be contacted at? [phone]    How would you like to obtain your AVS? Mail a copy    Subjective     Susi Aguila is a 74 year old female who presents via phone visit today for the following health issues:    HPI     Chief Complaint   Patient presents with     Results     Labs 9/15/2020- patient got a call for blood sugar level of 400, would like to discuss                Review of Systems   Constitutional, CV, endocrine systems are negative, except as otherwise noted.       Objective          Vitals:  No vitals were obtained today due to virtual visit.    healthy, alert and no distress  PSYCH: Alert and oriented times 3; coherent speech, normal   rate and volume, able to articulate logical thoughts, able   to abstract reason, no tangential thoughts, no hallucinations   or delusions  Her affect is normal and " pleasant  RESP: No cough, no audible wheezing, able to talk in full sentences  Remainder of exam unable to be completed due to telephone visits        Assessment/Plan:    Assessment & Plan     1. Type 2 diabetes mellitus with other neurologic complication, without long-term current use of insulin (H)    2. Gastroesophageal reflux disease, esophagitis presence not specified    3. Hyperlipidemia LDL goal <100    4. Hypothyroidism, unspecified type         1) Will start insulin and titrate to 15 units daily. Referred to MTM to assist with management.    2) Med dosing updated with pharmacy.    3,4) I suspect she's not taking either of these meds given her lab results. Refilled. Will recheck labs in 2 months.      Return in about 2 months (around 11/23/2020) for diabetes, thyroid follow up and repeat labs.    Dior Baez PA-C  [facility]    Phone call duration:  13 minutes

## 2020-09-25 ENCOUNTER — TELEPHONE (OUTPATIENT)
Dept: FAMILY MEDICINE | Facility: CLINIC | Age: 75
End: 2020-09-25

## 2020-09-25 NOTE — TELEPHONE ENCOUNTER
MTM referral from: [facility] visit (referral by provider)    MTM referral outreach attempt #2 on September 25, 2020 at 12:08 PM      Outcome: Patient not reachable after several attempts, will route to MTM Pharmacist/Provider as an FYI. Thank you for the referral.     Person who answered the number listed for Pt is not pt and doesn't not know the Pt.    Chinmay eMndes, MTM coordinator

## 2020-09-28 ENCOUNTER — TELEPHONE (OUTPATIENT)
Dept: FAMILY MEDICINE | Facility: CLINIC | Age: 75
End: 2020-09-28

## 2020-09-28 NOTE — TELEPHONE ENCOUNTER
Diabetes Education Scheduling Outreach #1:    Call to patient to schedule. Left message with phone number to call to schedule.    Plan for 2nd outreach attempt within 1 week.    Caren Schuler  [facility]  Diabetes and Nutrition Scheduling

## 2020-10-26 ENCOUNTER — TELEPHONE (OUTPATIENT)
Dept: FAMILY MEDICINE | Facility: CLINIC | Age: 75
End: 2020-10-26

## 2020-10-26 NOTE — TELEPHONE ENCOUNTER
Patient states she was told she needs to see Dior to go through all her medications and to order what she can deal with right now, insulin and acid reflux medication. Please call patient.

## 2020-10-26 NOTE — TELEPHONE ENCOUNTER
Patient contacted by phone. Appointment scheduled for Diabetic/ thyroid check with Dior Baez PA-C on 11/10/20. Nadege will be bringing in her prescription bottles  to talk more with Dior about her medications.     Rani Gonzalez RN BSN

## 2020-11-10 ENCOUNTER — VIRTUAL VISIT (OUTPATIENT)
Dept: FAMILY MEDICINE | Facility: CLINIC | Age: 75
End: 2020-11-10
Payer: COMMERCIAL

## 2020-11-10 DIAGNOSIS — E11.49 TYPE 2 DIABETES MELLITUS WITH OTHER NEUROLOGIC COMPLICATION, WITHOUT LONG-TERM CURRENT USE OF INSULIN (H): Primary | ICD-10-CM

## 2020-11-10 DIAGNOSIS — E78.5 HYPERLIPIDEMIA LDL GOAL <100: ICD-10-CM

## 2020-11-10 DIAGNOSIS — I10 ESSENTIAL HYPERTENSION: ICD-10-CM

## 2020-11-10 DIAGNOSIS — E03.9 HYPOTHYROIDISM, UNSPECIFIED TYPE: ICD-10-CM

## 2020-11-10 DIAGNOSIS — G25.81 RESTLESS LEG SYNDROME: ICD-10-CM

## 2020-11-10 PROCEDURE — 99443 PR PHYSICIAN TELEPHONE EVALUATION 21-30 MIN: CPT | Mod: 95 | Performed by: PHYSICIAN ASSISTANT

## 2020-11-10 RX ORDER — FLASH GLUCOSE SCANNING READER
1 EACH MISCELLANEOUS ONCE
Qty: 1 DEVICE | Refills: 0 | Status: SHIPPED | OUTPATIENT
Start: 2020-11-10 | End: 2020-11-10

## 2020-11-10 RX ORDER — ROPINIROLE 4 MG/1
4 TABLET, FILM COATED ORAL AT BEDTIME
Qty: 90 TABLET | Refills: 0 | Status: SHIPPED | OUTPATIENT
Start: 2020-11-10

## 2020-11-10 RX ORDER — FLASH GLUCOSE SENSOR
1 KIT MISCELLANEOUS
Qty: 2 EACH | Refills: 11 | Status: SHIPPED | OUTPATIENT
Start: 2020-11-10

## 2020-11-10 ASSESSMENT — ANXIETY QUESTIONNAIRES
6. BECOMING EASILY ANNOYED OR IRRITABLE: NEARLY EVERY DAY
7. FEELING AFRAID AS IF SOMETHING AWFUL MIGHT HAPPEN: NOT AT ALL
1. FEELING NERVOUS, ANXIOUS, OR ON EDGE: NOT AT ALL
IF YOU CHECKED OFF ANY PROBLEMS ON THIS QUESTIONNAIRE, HOW DIFFICULT HAVE THESE PROBLEMS MADE IT FOR YOU TO DO YOUR WORK, TAKE CARE OF THINGS AT HOME, OR GET ALONG WITH OTHER PEOPLE: SOMEWHAT DIFFICULT
GAD7 TOTAL SCORE: 4
2. NOT BEING ABLE TO STOP OR CONTROL WORRYING: NOT AT ALL
5. BEING SO RESTLESS THAT IT IS HARD TO SIT STILL: SEVERAL DAYS
3. WORRYING TOO MUCH ABOUT DIFFERENT THINGS: NOT AT ALL

## 2020-11-10 ASSESSMENT — PATIENT HEALTH QUESTIONNAIRE - PHQ9
SUM OF ALL RESPONSES TO PHQ QUESTIONS 1-9: 14
5. POOR APPETITE OR OVEREATING: NOT AT ALL

## 2020-11-10 NOTE — PROGRESS NOTES
"Susi Aguila is a 75 year old female who is being evaluated via a billable telephone visit.      The patient has been notified of following:     \"This telephone visit will be conducted via a call between you and your physician/provider. We have found that certain health care needs can be provided without the need for a physical exam.  This service lets us provide the care you need with a short phone conversation.  If a prescription is necessary we can send it directly to your pharmacy.  If lab work is needed we can place an order for that and you can then stop by our lab to have the test done at a later time.    Telephone visits are billed at different rates depending on your insurance coverage. During this emergency period, for some insurers they may be billed the same as an in-person visit.  Please reach out to your insurance provider with any questions.    If during the course of the call the physician/provider feels a telephone visit is not appropriate, you will not be charged for this service.\"    Patient has given verbal consent for Telephone visit?  Yes    What phone number would you like to be contacted at? [phone]    How would you like to obtain your AVS? Mail a copy    Subjective     Susi Aguila is a 75 year old female who presents via phone visit today for the following health issues:    HPI     Diabetes Follow-up      How often are you checking your blood sugar? Not at all    What concerns do you have today about your diabetes? None     Do you have any of these symptoms? (Select all that apply)  No numbness or tingling in feet.  No redness, sores or blisters on feet.  No complaints of excessive thirst.  No reports of blurry vision.  No significant changes to weight.    Have you had a diabetic eye exam in the last 12 months? Yes- Date of last eye exam: 11/18/2020,  Location: .    Takes meds \"on occasion\"  Forgets to take her pills ~3 days to most days of the week   Care taker, Ruben, on the " phone today: reports Nadege has been frustrated and has given up taking her meds  He's been with her for 15 years              Hyperlipidemia Follow-Up      Are you regularly taking any medication or supplement to lower your cholesterol?   Yes- atorvastatin    Are you having muscle aches or other side effects that you think could be caused by your cholesterol lowering medication?  No    Hypertension Follow-up      Do you check your blood pressure regularly outside of the clinic? No     Are you following a low salt diet? No    Are your blood pressures ever more than 140 on the top number (systolic) OR more   than 90 on the bottom number (diastolic), for example 140/90? Yes    BP Readings from Last 2 Encounters:   09/15/20 (!) 165/93   11/25/19 (!) 180/88     Hemoglobin A1C (%)   Date Value   09/15/2020 14.1 (H)   11/25/2019 10.0 (H)     LDL Cholesterol Calculated (mg/dL)   Date Value   09/15/2020     Cannot estimate LDL when triglyceride exceeds 400 mg/dL   11/25/2019     Cannot estimate LDL when triglyceride exceeds 400 mg/dL     LDL Cholesterol Direct (mg/dL)   Date Value   09/15/2020 205 (H)   11/25/2019 203 (H)       Hypothyroidism Follow-up       Since last visit, patient describes the following symptoms: Weight stable, no hair loss, no skin changes, no constipation, no loose stools      How many servings of fruits and vegetables do you eat daily?  0-1    On average, how many sweetened beverages do you drink each day (Examples: soda, juice, sweet tea, etc.  Do NOT count diet or artificially sweetened beverages)?   0    How many days per week do you exercise enough to make your heart beat faster? 3 or less    How many minutes a day do you exercise enough to make your heart beat faster? 9 or less  How many days per week do you miss taking your medication? Pt is not taking medication regularly     What makes it hard for you to take your medications?  remembering to take       Review of Systems   Constitutional,  cardiovascular, GI, neuro, endocrine and psych systems are negative, except as otherwise noted.       Objective          Vitals:  No vitals were obtained today due to virtual visit.    healthy, alert and no distress  PSYCH: Alert and oriented times 3; coherent speech, normal   rate and volume, able to articulate logical thoughts, able   to abstract reason, no tangential thoughts, no hallucinations   or delusions  Her affect is normal and pleasant  RESP: No cough, no audible wheezing, able to talk in full sentences  Remainder of exam unable to be completed due to telephone visits        Assessment/Plan:    1. Type 2 diabetes mellitus with other neurologic complication, without long-term current use of insulin (H)    2. Hyperlipidemia LDL goal <100    3. Hypothyroidism, unspecified type    4. Essential hypertension    5. Restless leg syndrome        1-5) We discussed the importance of taking her medications regularly. Her care taker, Ruben, states he will help her with this. Once she's been taking her medications regularly x2 months I can recheck her labs.     Phone call duration:  24 minutes

## 2020-11-11 ASSESSMENT — ANXIETY QUESTIONNAIRES: GAD7 TOTAL SCORE: 4

## 2020-11-13 DIAGNOSIS — G47.00 INSOMNIA, UNSPECIFIED TYPE: ICD-10-CM

## 2020-11-18 RX ORDER — MIRTAZAPINE 7.5 MG/1
TABLET, FILM COATED ORAL
Qty: 28 TABLET | Refills: 0 | Status: SHIPPED | OUTPATIENT
Start: 2020-11-18 | End: 2021-01-21

## 2020-12-05 ENCOUNTER — TELEPHONE (OUTPATIENT)
Dept: FAMILY MEDICINE | Facility: CLINIC | Age: 75
End: 2020-12-05

## 2020-12-05 NOTE — TELEPHONE ENCOUNTER
Diabetes Education Scheduling Outreach #2:    Call to patient to schedule. Left message with phone number to call to schedule.    Caren Schuler  [facility]  Diabetes and Nutrition Scheduling

## 2020-12-05 NOTE — TELEPHONE ENCOUNTER
Reason for call:  Other   Patient called regarding (reason for call): prescription  Additional comments: Patient called to get insulin changed to in-network insulin because is havin to pay over $300 for the Basaglar pens per patient.    Phone number to reach patient:  Home number on file [phone] (home)    Best Time:  Anytime    Can we leave a detailed message on this number?  YES    Travel screening: Not Applicable

## 2020-12-07 NOTE — TELEPHONE ENCOUNTER
According to the [id number] - [place], [place] - [address] AT [facility] patient picked up an 88 day supply of her insulin on November 10.     This should last until early February. Patient may want to check with her plan after January 1 for any changes in coverage.    Left message on voice mail for patient to call clinic. [phone]    Rani Gonzalez RN BSN

## 2020-12-08 NOTE — TELEPHONE ENCOUNTER
Left message on voice mail for patient to call clinic. Patient will need to check with pharmacy and insurance on what is covered better. [phone].  Vandana Ramos RN

## 2020-12-10 NOTE — TELEPHONE ENCOUNTER
Spoke with patient and instructed her to check with her insurance so they will tell her what insulin is in network and covered.  So when refill needed she will let us know what medication will be covered.  She voiced understanding and agreement  Vandana Ramso RN

## 2020-12-23 ENCOUNTER — PATIENT OUTREACH (OUTPATIENT)
Dept: EDUCATION SERVICES | Facility: CLINIC | Age: 75
End: 2020-12-23
Payer: COMMERCIAL

## 2020-12-23 NOTE — PROGRESS NOTES
Called out to Susi Aguila x 2,  Voicemail left requesting return phone call to update diabetes education, [phone] for re-scheduling.     Routed to scheduling to assist patient with rescheduling.     Rebecca Pollock RD, LD, CDE  Diabetes Education

## 2021-01-09 NOTE — TELEPHONE ENCOUNTER
Called pt, as unsure if she thought today's MTM visit was over the phone. Left message for return call.      no

## 2021-01-21 ENCOUNTER — VIRTUAL VISIT (OUTPATIENT)
Dept: FAMILY MEDICINE | Facility: CLINIC | Age: 76
End: 2021-01-21
Payer: COMMERCIAL

## 2021-01-21 DIAGNOSIS — K21.9 GASTROESOPHAGEAL REFLUX DISEASE, UNSPECIFIED WHETHER ESOPHAGITIS PRESENT: ICD-10-CM

## 2021-01-21 DIAGNOSIS — E11.49 TYPE 2 DIABETES MELLITUS WITH OTHER NEUROLOGIC COMPLICATION, WITHOUT LONG-TERM CURRENT USE OF INSULIN (H): Primary | ICD-10-CM

## 2021-01-21 PROCEDURE — 99214 OFFICE O/P EST MOD 30 MIN: CPT | Mod: 95 | Performed by: PHYSICIAN ASSISTANT

## 2021-01-21 RX ORDER — GLIMEPIRIDE 2 MG/1
2 TABLET ORAL
Qty: 90 TABLET | Refills: 0 | Status: SHIPPED | OUTPATIENT
Start: 2021-01-21

## 2021-01-21 RX ORDER — OMEPRAZOLE 20 MG/1
20 TABLET, DELAYED RELEASE ORAL
Qty: 180 TABLET | Refills: 0 | Status: SHIPPED | OUTPATIENT
Start: 2021-01-21

## 2021-01-21 NOTE — PROGRESS NOTES
Nadege is a 75 year old who is being evaluated via a billable telephone visit.      What phone number would you like to be contacted at? [phone]  How would you like to obtain your AVS? Mail a copy       Assessment & Plan     1. Type 2 diabetes mellitus with other neurologic complication, without long-term current use of insulin (H)    2. Gastroesophageal reflux disease, unspecified whether esophagitis present        1) Patient has stopped her Metformin. Will start Glimepiride 2mg instead. Continue insulin, no changes. Follow up in 1 month, in person visit, to review labs and medications. I asked her bring her medication bottles to the visit with her.     2) Med refilled, no change.                                Return in about 1 month (around 2/21/2021) for a med check (DM, thyroid, HTN) and labs - 40 mins.    Dior Baez PA-C  [facility] is a 75 year old who presents to clinic today for the following health issues     HPI       Diabetes Follow-up    How often are you checking your blood sugar? One time daily  What time of day are you checking your blood sugars (select all that apply)?  Before meals  Have you had any blood sugars above 200?  No  Have you had any blood sugars below 70?  No    What symptoms do you notice when your blood sugar is low?  Shaky    What concerns do you have today about your diabetes?  Other: would like to get a Ankur sensor     Do you have any of these symptoms? (Select all that apply)  Burning in feet and Excessive thirst    Have you had a diabetic eye exam in the last 12 months? Argelia Miranda helps monitor insulin injections  Stopped Metformin bc it dissolves in her mouth      BP Readings from Last 2 Encounters:   09/15/20 (!) 165/93   11/25/19 (!) 180/88     Hemoglobin A1C (%)   Date Value   09/15/2020 14.1 (H)   11/25/2019 10.0 (H)     LDL Cholesterol Calculated (mg/dL)   Date Value   09/15/2020     Cannot estimate LDL  when triglyceride exceeds 400 mg/dL   11/25/2019     Cannot estimate LDL when triglyceride exceeds 400 mg/dL     LDL Cholesterol Direct (mg/dL)   Date Value   09/15/2020 205 (H)   11/25/2019 203 (H)                 How many servings of fruits and vegetables do you eat daily?  0-1    On average, how many sweetened beverages do you drink each day (Examples: soda, juice, sweet tea, etc.  Do NOT count diet or artificially sweetened beverages)?   3    How many days per week do you exercise enough to make your heart beat faster? none    How many minutes a day do you exercise enough to make your heart beat faster? none  How many days per week do you miss taking your medication? 7    What makes it hard for you to take your medications?  remembering to take      Review of Systems   Constitutional, endocrine systems are negative, except as otherwise noted.      Objective           Vitals:  No vitals were obtained today due to virtual visit.    Physical Exam   healthy, alert and no distress  PSYCH: Alert and oriented times 3; coherent speech, normal   rate and volume, able to articulate logical thoughts, able   to abstract reason, no tangential thoughts, no hallucinations   or delusions  Her affect is normal and pleasant  RESP: No cough, no audible wheezing, able to talk in full sentences  Remainder of exam unable to be completed due to telephone visits          Phone call duration: 19 minutes   No discharge, lesions. Rectal exam: Lot 147, Exp: 12/31/2019; guaiac positive.

## 2021-05-29 ENCOUNTER — RECORDS - HEALTHEAST (OUTPATIENT)
Dept: ADMINISTRATIVE | Facility: CLINIC | Age: 76
End: 2021-05-29

## 2021-05-31 ENCOUNTER — RECORDS - HEALTHEAST (OUTPATIENT)
Dept: ADMINISTRATIVE | Facility: CLINIC | Age: 76
End: 2021-05-31

## 2021-08-09 NOTE — TELEPHONE ENCOUNTER
Prescription approved per [facility].    
ranitidine      Last Written Prescription Date: 05/19/16  Last Fill Quantity: 180,  # refills: 3   Last Office Visit with G, [facility] or [facility] prescribing provider: 03/09/17                                               
The patient is a 35y Female complaining of abdominal pain.

## 2021-10-19 PROBLEM — F32.9 MAJOR DEPRESSION: Status: ACTIVE | Noted: 2018-01-28

## 2022-06-29 ENCOUNTER — PATIENT OUTREACH (OUTPATIENT)
Dept: FAMILY MEDICINE | Facility: CLINIC | Age: 77
End: 2022-06-29

## 2022-06-29 NOTE — LETTER
June 29, 2022      Susi Aguila  [address]   [place] [address]              Your healthcare team cares about your health. To provide you with the best care,   we have reviewed your chart and based on our findings, we see that you are due to:     - DIABETES FOLLOW UP: Schedule a diabetic follow up appointment as a Office Visit. Patients with diabetes should generally see their provider every 3-6 months.    Schedule a DIABETIC EYE EXAM.  This exam is done with an optometrist. You can schedule this appointment with your eye doctor.  If you need a referral, please let us know.  - HYPERTENSION FOLLOW UP: Office Visit  - BREAST CANCER SCREENING:  Schedule Annual Mammogram. [facility] scheduling number - [phone] or schedule in MyChart (self referall)  - COLON CANCER SCREENING:  Call or mychart the clinic to schedule your colonoscopy or schedule/ your FIT Test, or Cologuard test  - OTHER FOLLOW UP:  Yearly physical and Update immunizations (Covid, Tdap, and Shingles)    If you have already completed these items, please contact the clinic via phone or   [employer] so your care team can review and update your records. Thank you for   choosing [facility] for your healthcare needs. For any questions,   concerns, or to schedule an appointment please contact the clinic.       Healthy Regards,      Your [facility] Care Team

## 2022-06-29 NOTE — TELEPHONE ENCOUNTER
Patient Quality Outreach    Patient is due for the following:   Diabetes -  A1C, LDL (Fasting), Eye Exam, BMP, and Microalbumin  Hypertension -  BP check  Colon Cancer Screening -  FIT, Colonoscopy or Cologuard  Breast Cancer Screening - Mammogram  Depression  -  PHQ-9 Needed  Physical  - due now  Immunizations  -  Covid, Tdap and Zoster    NEXT STEPS:   Schedule a yearly physical    Type of outreach:    Sent letter.      Questions for provider review:    None     Rosette Aguirre, [facility]  Chart routed to Care Team.

## 2022-08-05 NOTE — TELEPHONE ENCOUNTER
Patient Quality Outreach    Patient is due for the following:   Diabetes -  A1C, LDL (Fasting), Eye Exam, Microalbumin and Foot Exam  Hypertension -  Hypertension follow-up visit  Colon Cancer Screening  Breast Cancer Screening - Mammogram  Depression  -  PHQ-9 needed  Physical Annual Wellness Visit      Topic Date Due     COVID-19 Vaccine (1) Never done     Zoster (Shingles) Vaccine (2 of 3) 11/21/2012     Diptheria Tetanus Pertussis (DTAP/TDAP/TD) Vaccine (2 - Td or Tdap) 04/09/2022       Next Steps:   Schedule a office visit for diabetes and hypertension yearly physical    Type of outreach:    Phone, left message for patient/parent to call back.    Next Steps:  Reach out within 90 days via Phone.    Max number of attempts reached: Yes. Will try again in 90 days if patient still on fail list.    Questions for provider review:    None     Rosette Aguirre, [facility]

## 2022-11-03 NOTE — PATIENT INSTRUCTIONS
Restart your atorvastatin, lisinopril, and levothyroxine.         Before Your Surgery      Call your surgeon if there is any change in your health. This includes signs of a cold or flu (such as a sore throat, runny nose, cough, rash or fever).    Do not smoke, drink alcohol or take over the counter medicine (unless your surgeon or primary care doctor tells you to) for the 24 hours before and after surgery.    If you take prescribed drugs: Follow your doctor s orders about which medicines to take and which to stop until after surgery.    Eating and drinking prior to surgery: follow the instructions from your surgeon    Take a shower or bath the night before surgery. Use the soap your surgeon gave you to gently clean your skin. If you do not have soap from your surgeon, use your regular soap. Do not shave or scrub the surgery site.  Wear clean pajamas and have clean sheets on your bed.    Opzelura Counseling:  I discussed with the patient the risks of Opzelura including but not limited to nasopharngitis, bronchitis, ear infection, eosinophila, hives, diarrhea, folliculitis, tonsillitis, and rhinorrhea.  Taken orally, this medication has been linked to serious infections; higher rate of mortality; malignancy and lymphoproliferative disorders; major adverse cardiovascular events; thrombosis; thrombocytopenia, anemia, and neutropenia; and lipid elevations.

## 2023-05-10 NOTE — PATIENT INSTRUCTIONS
"Please take the medication    Please return in about 6 weeks for us to check how the medication is working and how well you are emptying your bladder  AFTER YOUR CYSTOSCOPY        You have just completed a cystoscopy, or \"cysto\", which allowed your physician to learn more about your bladder (or to remove a stent placed after surgery). We suggest that you continue to avoid caffeine, fruit juice, and alcohol for the next 24 hours, however, you are encouraged to return to your normal activities.         A few things that are considered normal after your cystoscopy:     * Small amount of bleeding (or spotting) that clears within the next 24 hours     * Slight burning sensation with urination     * Sensation to of needing to avoid more frequently     * The feeling of \"air\" in your urine     * Mild discomfort that is relieved with Tylenol        Please contact our office promptly if you:     * Develop a fever above 101 degrees     * Are unable to urinate     * Develop bright red blood that does not stop     * Severe pain or swelling         Please contact our office with any concerns or questions @[email or web address].  "
Render In Strict Bullet Format?: Yes
Detail Level: Detailed
Initiate Treatment: Sunscreen SPF 30 or greater daily

## 2023-09-11 ENCOUNTER — TELEPHONE (OUTPATIENT)
Dept: FAMILY MEDICINE | Facility: CLINIC | Age: 78
End: 2023-09-11
Payer: COMMERCIAL

## 2023-09-11 NOTE — TELEPHONE ENCOUNTER
Tayla () called from [facility] asking for a referral for Palliative care for patient.     Patient had received a  Palliative Care referral from [facility] in August (after being hospitalized) , however her insurance will not honor it.     Patient was last seen in the clinic by Dior Baez PA-C 9/15/2020.   She had a Virtual visit on 1/21/21.     I informed the  that Dior would not be able to place a referral for Palliative Care without an office visit.       verbalized a good understanding and said she will talk with the Nurse practitioner at their facility to see if they can place the referral.     Rani Gonzalez RN BSN  [facility]

## 2023-09-19 ENCOUNTER — LAB REQUISITION (OUTPATIENT)
Dept: LAB | Facility: CLINIC | Age: 78
End: 2023-09-19
Payer: COMMERCIAL

## 2023-09-19 DIAGNOSIS — E11.9 TYPE 2 DIABETES MELLITUS WITHOUT COMPLICATIONS (H): ICD-10-CM

## 2023-09-19 DIAGNOSIS — E03.9 HYPOTHYROIDISM, UNSPECIFIED: ICD-10-CM

## 2023-09-20 LAB
HBA1C MFR BLD: 6.9 %
TSH SERPL DL<=0.005 MIU/L-ACNC: 11.1 UIU/ML (ref 0.3–4.2)

## 2023-09-20 PROCEDURE — P9604 ONE-WAY ALLOW PRORATED TRIP: HCPCS | Performed by: REGISTERED NURSE

## 2023-09-20 PROCEDURE — 83036 HEMOGLOBIN GLYCOSYLATED A1C: CPT | Mod: ORL | Performed by: REGISTERED NURSE

## 2023-09-20 PROCEDURE — 84443 ASSAY THYROID STIM HORMONE: CPT | Performed by: REGISTERED NURSE

## 2023-09-20 PROCEDURE — 36415 COLL VENOUS BLD VENIPUNCTURE: CPT | Performed by: REGISTERED NURSE

## 2023-10-10 ENCOUNTER — LAB REQUISITION (OUTPATIENT)
Dept: LAB | Facility: CLINIC | Age: 78
End: 2023-10-10
Payer: COMMERCIAL

## 2023-10-10 DIAGNOSIS — E03.9 HYPOTHYROIDISM, UNSPECIFIED: ICD-10-CM

## 2023-10-11 LAB — TSH SERPL DL<=0.005 MIU/L-ACNC: 36.3 UIU/ML (ref 0.3–4.2)

## 2023-10-11 PROCEDURE — P9604 ONE-WAY ALLOW PRORATED TRIP: HCPCS | Mod: ORL | Performed by: REGISTERED NURSE

## 2023-10-11 PROCEDURE — 84443 ASSAY THYROID STIM HORMONE: CPT | Mod: ORL | Performed by: REGISTERED NURSE

## 2023-10-11 PROCEDURE — 36415 COLL VENOUS BLD VENIPUNCTURE: CPT | Mod: ORL | Performed by: REGISTERED NURSE

## 2023-10-12 ENCOUNTER — DOCUMENTATION ONLY (OUTPATIENT)
Dept: OTHER | Facility: CLINIC | Age: 78
End: 2023-10-12
Payer: COMMERCIAL

## 2024-06-17 PROBLEM — Z71.89 ADVANCED DIRECTIVES, COUNSELING/DISCUSSION: Status: RESOLVED | Noted: 2017-03-09 | Resolved: 2024-06-17

## 2024-09-03 ENCOUNTER — LAB REQUISITION (OUTPATIENT)
Dept: LAB | Facility: CLINIC | Age: 79
End: 2024-09-03
Payer: COMMERCIAL

## 2024-09-03 DIAGNOSIS — U07.1 COVID-19: ICD-10-CM

## 2024-09-04 LAB
ALBUMIN SERPL BCG-MCNC: 4 G/DL (ref 3.5–5.2)
ALP SERPL-CCNC: 76 U/L (ref 40–150)
ALT SERPL W P-5'-P-CCNC: 8 U/L (ref 0–50)
ANION GAP SERPL CALCULATED.3IONS-SCNC: 14 MMOL/L (ref 7–15)
AST SERPL W P-5'-P-CCNC: 27 U/L (ref 0–45)
BILIRUB SERPL-MCNC: 0.5 MG/DL
BUN SERPL-MCNC: 31.9 MG/DL (ref 8–23)
CALCIUM SERPL-MCNC: 9.1 MG/DL (ref 8.8–10.4)
CHLORIDE SERPL-SCNC: 106 MMOL/L (ref 98–107)
CREAT SERPL-MCNC: 1.43 MG/DL (ref 0.51–0.95)
EGFRCR SERPLBLD CKD-EPI 2021: 38 ML/MIN/1.73M2
GLUCOSE SERPL-MCNC: 113 MG/DL (ref 70–99)
HCO3 SERPL-SCNC: 22 MMOL/L (ref 22–29)
POTASSIUM SERPL-SCNC: 3.9 MMOL/L (ref 3.4–5.3)
PROT SERPL-MCNC: 7.3 G/DL (ref 6.4–8.3)
SODIUM SERPL-SCNC: 142 MMOL/L (ref 135–145)

## 2024-09-04 PROCEDURE — P9604 ONE-WAY ALLOW PRORATED TRIP: HCPCS | Mod: ORL | Performed by: REGISTERED NURSE

## 2024-09-04 PROCEDURE — 80053 COMPREHEN METABOLIC PANEL: CPT | Mod: ORL | Performed by: REGISTERED NURSE

## 2024-09-04 PROCEDURE — 36415 COLL VENOUS BLD VENIPUNCTURE: CPT | Mod: ORL | Performed by: REGISTERED NURSE

## 2025-03-05 ENCOUNTER — LAB REQUISITION (OUTPATIENT)
Dept: LAB | Facility: CLINIC | Age: 80
End: 2025-03-05
Payer: COMMERCIAL

## 2025-03-05 DIAGNOSIS — E03.9 HYPOTHYROIDISM, UNSPECIFIED: ICD-10-CM

## 2025-03-05 DIAGNOSIS — I10 ESSENTIAL (PRIMARY) HYPERTENSION: ICD-10-CM

## 2025-03-07 LAB
ANION GAP SERPL CALCULATED.3IONS-SCNC: 9 MMOL/L (ref 7–15)
BASOPHILS # BLD AUTO: 0.1 10E3/UL (ref 0–0.2)
BASOPHILS NFR BLD AUTO: 1 %
BUN SERPL-MCNC: 18.1 MG/DL (ref 8–23)
CALCIUM SERPL-MCNC: 9.1 MG/DL (ref 8.8–10.4)
CHLORIDE SERPL-SCNC: 107 MMOL/L (ref 98–107)
CREAT SERPL-MCNC: 1.41 MG/DL (ref 0.51–0.95)
EGFRCR SERPLBLD CKD-EPI 2021: 38 ML/MIN/1.73M2
EOSINOPHIL # BLD AUTO: 0.3 10E3/UL (ref 0–0.7)
EOSINOPHIL NFR BLD AUTO: 4 %
ERYTHROCYTE [DISTWIDTH] IN BLOOD BY AUTOMATED COUNT: 14 % (ref 10–15)
GLUCOSE SERPL-MCNC: 96 MG/DL (ref 70–99)
HCO3 SERPL-SCNC: 23 MMOL/L (ref 22–29)
HCT VFR BLD AUTO: 35.3 % (ref 35–47)
HGB BLD-MCNC: 11 G/DL (ref 11.7–15.7)
IMM GRANULOCYTES # BLD: 0 10E3/UL
IMM GRANULOCYTES NFR BLD: 0 %
LYMPHOCYTES # BLD AUTO: 2.6 10E3/UL (ref 0.8–5.3)
LYMPHOCYTES NFR BLD AUTO: 33 %
MCH RBC QN AUTO: 28.5 PG (ref 26.5–33)
MCHC RBC AUTO-ENTMCNC: 31.2 G/DL (ref 31.5–36.5)
MCV RBC AUTO: 92 FL (ref 78–100)
MONOCYTES # BLD AUTO: 0.7 10E3/UL (ref 0–1.3)
MONOCYTES NFR BLD AUTO: 9 %
NEUTROPHILS # BLD AUTO: 4.2 10E3/UL (ref 1.6–8.3)
NEUTROPHILS NFR BLD AUTO: 53 %
NRBC # BLD AUTO: 0 10E3/UL
NRBC BLD AUTO-RTO: 0 /100
PLATELET # BLD AUTO: 242 10E3/UL (ref 150–450)
POTASSIUM SERPL-SCNC: 4.4 MMOL/L (ref 3.4–5.3)
RBC # BLD AUTO: 3.86 10E6/UL (ref 3.8–5.2)
SODIUM SERPL-SCNC: 139 MMOL/L (ref 135–145)
TSH SERPL DL<=0.005 MIU/L-ACNC: 6.89 UIU/ML (ref 0.3–4.2)
WBC # BLD AUTO: 8 10E3/UL (ref 4–11)

## 2025-03-07 PROCEDURE — 85025 COMPLETE CBC W/AUTO DIFF WBC: CPT | Mod: ORL | Performed by: REGISTERED NURSE

## 2025-03-07 PROCEDURE — 80048 BASIC METABOLIC PNL TOTAL CA: CPT | Mod: ORL | Performed by: REGISTERED NURSE

## 2025-03-07 PROCEDURE — 36415 COLL VENOUS BLD VENIPUNCTURE: CPT | Mod: ORL | Performed by: REGISTERED NURSE

## 2025-03-07 PROCEDURE — P9603 ONE-WAY ALLOW PRORATED MILES: HCPCS | Mod: ORL | Performed by: REGISTERED NURSE

## 2025-03-07 PROCEDURE — 84443 ASSAY THYROID STIM HORMONE: CPT | Mod: ORL | Performed by: REGISTERED NURSE
